# Patient Record
Sex: FEMALE | Race: WHITE | NOT HISPANIC OR LATINO | URBAN - METROPOLITAN AREA
[De-identification: names, ages, dates, MRNs, and addresses within clinical notes are randomized per-mention and may not be internally consistent; named-entity substitution may affect disease eponyms.]

---

## 2023-01-31 PROBLEM — M25.569 KNEE PAIN: Status: ACTIVE | Noted: 2023-01-31

## 2023-01-31 PROBLEM — E78.5 ELEVATED LIPIDS: Status: ACTIVE | Noted: 2023-01-31

## 2023-01-31 PROBLEM — H52.10 MYOPIA WITH ASTIGMATISM AND PRESBYOPIA: Status: ACTIVE | Noted: 2023-01-31

## 2023-01-31 PROBLEM — R92.30 DENSE BREAST TISSUE: Status: ACTIVE | Noted: 2023-01-31

## 2023-01-31 PROBLEM — H11.433 CONJUNCTIVAL HYPEREMIA OF BOTH EYES: Status: ACTIVE | Noted: 2023-01-31

## 2023-01-31 PROBLEM — H04.121 DRY EYE SYNDROME OF RIGHT LACRIMAL GLAND: Status: ACTIVE | Noted: 2023-01-31

## 2023-01-31 PROBLEM — I10 BENIGN ESSENTIAL HYPERTENSION: Status: ACTIVE | Noted: 2023-01-31

## 2023-01-31 PROBLEM — H40.001 GLAUCOMA SUSPECT OF RIGHT EYE: Status: ACTIVE | Noted: 2023-01-31

## 2023-01-31 PROBLEM — H40.059 ELEVATED IOP: Status: ACTIVE | Noted: 2023-01-31

## 2023-01-31 PROBLEM — G47.00 INSOMNIA: Status: ACTIVE | Noted: 2023-01-31

## 2023-01-31 PROBLEM — M79.662 PAIN OF LEFT CALF: Status: ACTIVE | Noted: 2023-01-31

## 2023-01-31 PROBLEM — L97.211: Status: ACTIVE | Noted: 2023-01-31

## 2023-01-31 PROBLEM — H52.4 MYOPIA WITH ASTIGMATISM AND PRESBYOPIA: Status: ACTIVE | Noted: 2023-01-31

## 2023-01-31 PROBLEM — H04.122 DRY EYE SYNDROME OF LEFT LACRIMAL GLAND: Status: ACTIVE | Noted: 2023-01-31

## 2023-01-31 PROBLEM — H52.209 MYOPIA WITH ASTIGMATISM AND PRESBYOPIA: Status: ACTIVE | Noted: 2023-01-31

## 2023-01-31 RX ORDER — MULTIVIT-MIN/IRON/FOLIC ACID/K 18-600-40
CAPSULE ORAL
COMMUNITY

## 2023-01-31 RX ORDER — AMLODIPINE BESYLATE 2.5 MG/1
2.5 TABLET ORAL DAILY
COMMUNITY
End: 2023-03-27 | Stop reason: DRUGHIGH

## 2023-01-31 RX ORDER — LATANOPROST 50 UG/ML
1 SOLUTION/ DROPS OPHTHALMIC NIGHTLY
COMMUNITY
Start: 2022-01-14 | End: 2023-11-20 | Stop reason: SDUPTHER

## 2023-01-31 RX ORDER — SPIRONOLACTONE 50 MG/1
1 TABLET, FILM COATED ORAL DAILY
COMMUNITY
Start: 2014-03-04

## 2023-03-14 ENCOUNTER — OFFICE VISIT (OUTPATIENT)
Dept: PRIMARY CARE | Facility: CLINIC | Age: 58
End: 2023-03-14
Payer: MEDICAID

## 2023-03-14 VITALS
HEIGHT: 66 IN | HEART RATE: 88 BPM | SYSTOLIC BLOOD PRESSURE: 136 MMHG | DIASTOLIC BLOOD PRESSURE: 86 MMHG | WEIGHT: 119 LBS | BODY MASS INDEX: 19.13 KG/M2

## 2023-03-14 DIAGNOSIS — I10 BENIGN ESSENTIAL HYPERTENSION: Primary | ICD-10-CM

## 2023-03-14 PROCEDURE — 3075F SYST BP GE 130 - 139MM HG: CPT | Performed by: INTERNAL MEDICINE

## 2023-03-14 PROCEDURE — 1036F TOBACCO NON-USER: CPT | Performed by: INTERNAL MEDICINE

## 2023-03-14 PROCEDURE — 3079F DIAST BP 80-89 MM HG: CPT | Performed by: INTERNAL MEDICINE

## 2023-03-14 PROCEDURE — 99213 OFFICE O/P EST LOW 20 MIN: CPT | Performed by: INTERNAL MEDICINE

## 2023-03-14 RX ORDER — FLUOROURACIL 50 MG/G
CREAM TOPICAL
COMMUNITY
Start: 2023-01-20 | End: 2024-01-16

## 2023-03-14 RX ORDER — TRIAMCINOLONE ACETONIDE 1 MG/G
OINTMENT TOPICAL
COMMUNITY
Start: 2023-02-21

## 2023-03-14 RX ORDER — VALACYCLOVIR HYDROCHLORIDE 1 G/1
TABLET, FILM COATED ORAL
COMMUNITY
Start: 2023-02-21

## 2023-03-14 RX ORDER — TRETINOIN 0.5 MG/G
CREAM TOPICAL
COMMUNITY
Start: 2022-12-21

## 2023-03-14 ASSESSMENT — PAIN SCALES - GENERAL: PAINLEVEL: 0-NO PAIN

## 2023-03-14 ASSESSMENT — ENCOUNTER SYMPTOMS
ENDOCRINE NEGATIVE: 1
MUSCULOSKELETAL NEGATIVE: 1
HEMATOLOGIC/LYMPHATIC NEGATIVE: 1
CONSTITUTIONAL NEGATIVE: 1
ALLERGIC/IMMUNOLOGIC NEGATIVE: 1
NEUROLOGICAL NEGATIVE: 1
EYES NEGATIVE: 1
PSYCHIATRIC NEGATIVE: 1
GASTROINTESTINAL NEGATIVE: 1
RESPIRATORY NEGATIVE: 1
CARDIOVASCULAR NEGATIVE: 1

## 2023-03-14 NOTE — PROGRESS NOTES
"Subjective   Patient ID: Amara Cote is a 57 y.o. female who presents for No chief complaint on file..    HTN follow up-         HPI     Feeling well- started Amlodipine 2.5 mg daily          Review of Systems   Constitutional: Negative.    HENT: Negative.     Eyes: Negative.    Respiratory: Negative.     Cardiovascular: Negative.    Gastrointestinal: Negative.    Endocrine: Negative.    Genitourinary: Negative.    Musculoskeletal: Negative.    Skin: Negative.    Allergic/Immunologic: Negative.    Neurological: Negative.    Hematological: Negative.    Psychiatric/Behavioral: Negative.         Objective   BP (!) 154/102 (BP Location: Left arm, Patient Position: Sitting, BP Cuff Size: Small adult)   Pulse 88   Ht 1.676 m (5' 6\")   Wt 54 kg (119 lb)   BMI 19.21 kg/m²     Physical Exam  Constitutional:       Appearance: Normal appearance.   HENT:      Head: Normocephalic and atraumatic.      Right Ear: Tympanic membrane, ear canal and external ear normal.      Left Ear: Tympanic membrane, ear canal and external ear normal.      Nose: Nose normal.      Mouth/Throat:      Mouth: Mucous membranes are moist.   Eyes:      Extraocular Movements: Extraocular movements intact.      Conjunctiva/sclera: Conjunctivae normal.      Pupils: Pupils are equal, round, and reactive to light.   Cardiovascular:      Rate and Rhythm: Normal rate and regular rhythm.      Pulses: Normal pulses.      Heart sounds: Normal heart sounds.   Pulmonary:      Effort: Pulmonary effort is normal.      Breath sounds: Normal breath sounds.   Abdominal:      General: Abdomen is flat. Bowel sounds are normal.      Palpations: Abdomen is soft.   Musculoskeletal:         General: Normal range of motion.      Cervical back: Normal range of motion.   Skin:     General: Skin is warm.   Neurological:      General: No focal deficit present.      Mental Status: She is alert and oriented to person, place, and time.   Psychiatric:         Mood and Affect: " Mood normal.         Behavior: Behavior normal.         Assessment/Plan       #1  HTN    Increase Amlodipine 5 mg daily    MANDA < 2000 MG /day    Increase K intake ( Greens/Beans/Potatoes/Bananas)    Physical activity-     #2 HLD    CT-Cardiac score    Continue with Current treatment    Follow up in 3 months/PRN

## 2023-03-27 ENCOUNTER — TELEPHONE (OUTPATIENT)
Dept: PRIMARY CARE | Facility: CLINIC | Age: 58
End: 2023-03-27
Payer: COMMERCIAL

## 2023-03-27 RX ORDER — AMLODIPINE BESYLATE 5 MG/1
5 TABLET ORAL DAILY
Qty: 90 TABLET | Refills: 3 | Status: SHIPPED | OUTPATIENT
Start: 2023-03-27 | End: 2023-03-30 | Stop reason: SDUPTHER

## 2023-03-27 NOTE — TELEPHONE ENCOUNTER
Patient said KATIE was supposed to call in increased dose of Amlodipine to pharmacy and did not she said she believes it was for 5 MG.    [facility]    KATIE sent in medication

## 2023-03-30 ENCOUNTER — TELEPHONE (OUTPATIENT)
Dept: PRIMARY CARE | Facility: CLINIC | Age: 58
End: 2023-03-30
Payer: COMMERCIAL

## 2023-03-30 DIAGNOSIS — I10 PRIMARY HYPERTENSION: Primary | ICD-10-CM

## 2023-03-30 RX ORDER — AMLODIPINE BESYLATE 5 MG/1
5 TABLET ORAL DAILY
Qty: 90 TABLET | Refills: 3 | Status: SHIPPED | OUTPATIENT
Start: 2023-03-30 | End: 2024-03-07 | Stop reason: SDUPTHER

## 2023-03-30 NOTE — TELEPHONE ENCOUNTER
Pt called in and stated that she has called the pharmacy multiple times and is being told there is nothing in the system for RX: Amlodipine 5mg tablet      [facility] [phone]          RX was resent to pharmacy per RMB . I called and spoke with Pt and she stated she is on her way to  now

## 2023-06-06 ENCOUNTER — APPOINTMENT (OUTPATIENT)
Dept: PRIMARY CARE | Facility: CLINIC | Age: 58
End: 2023-06-06
Payer: COMMERCIAL

## 2023-07-19 ENCOUNTER — LAB (OUTPATIENT)
Dept: LAB | Facility: LAB | Age: 58
End: 2023-07-19
Payer: COMMERCIAL

## 2023-07-19 ENCOUNTER — OFFICE VISIT (OUTPATIENT)
Dept: PRIMARY CARE | Facility: CLINIC | Age: 58
End: 2023-07-19
Payer: COMMERCIAL

## 2023-07-19 VITALS
WEIGHT: 121.6 LBS | OXYGEN SATURATION: 95 % | DIASTOLIC BLOOD PRESSURE: 84 MMHG | SYSTOLIC BLOOD PRESSURE: 140 MMHG | RESPIRATION RATE: 20 BRPM | HEART RATE: 83 BPM | BODY MASS INDEX: 19.63 KG/M2

## 2023-07-19 DIAGNOSIS — L81.7 PIGMENTED PURPURIC DERMATOSIS: Primary | ICD-10-CM

## 2023-07-19 DIAGNOSIS — Z00.00 ANNUAL PHYSICAL EXAM: ICD-10-CM

## 2023-07-19 DIAGNOSIS — D47.2 MGUS (MONOCLONAL GAMMOPATHY OF UNKNOWN SIGNIFICANCE): ICD-10-CM

## 2023-07-19 DIAGNOSIS — I10 BENIGN ESSENTIAL HYPERTENSION: ICD-10-CM

## 2023-07-19 DIAGNOSIS — L81.7 PIGMENTED PURPURIC DERMATOSIS: ICD-10-CM

## 2023-07-19 LAB
ALANINE AMINOTRANSFERASE (SGPT) (U/L) IN SER/PLAS: 11 U/L (ref 7–45)
ALBUMIN (G/DL) IN SER/PLAS: 5 G/DL (ref 3.4–5)
ALKALINE PHOSPHATASE (U/L) IN SER/PLAS: 61 U/L (ref 33–110)
ANION GAP IN SER/PLAS: 14 MMOL/L (ref 10–20)
ASPARTATE AMINOTRANSFERASE (SGOT) (U/L) IN SER/PLAS: 17 U/L (ref 9–39)
BASOPHILS (10*3/UL) IN BLOOD BY AUTOMATED COUNT: 0.05 X10E9/L (ref 0–0.1)
BASOPHILS/100 LEUKOCYTES IN BLOOD BY AUTOMATED COUNT: 0.7 % (ref 0–2)
BILIRUBIN TOTAL (MG/DL) IN SER/PLAS: 0.6 MG/DL (ref 0–1.2)
C REACTIVE PROTEIN (MG/L) IN SER/PLAS: <0.1 MG/DL
CALCIUM (MG/DL) IN SER/PLAS: 9.7 MG/DL (ref 8.6–10.3)
CARBON DIOXIDE, TOTAL (MMOL/L) IN SER/PLAS: 29 MMOL/L (ref 21–32)
CHLORIDE (MMOL/L) IN SER/PLAS: 96 MMOL/L (ref 98–107)
CREATININE (MG/DL) IN SER/PLAS: 0.72 MG/DL (ref 0.5–1.05)
EOSINOPHILS (10*3/UL) IN BLOOD BY AUTOMATED COUNT: 0.08 X10E9/L (ref 0–0.7)
EOSINOPHILS/100 LEUKOCYTES IN BLOOD BY AUTOMATED COUNT: 1.1 % (ref 0–6)
ERYTHROCYTE DISTRIBUTION WIDTH (RATIO) BY AUTOMATED COUNT: 12 % (ref 11.5–14.5)
ERYTHROCYTE MEAN CORPUSCULAR HEMOGLOBIN CONCENTRATION (G/DL) BY AUTOMATED: 34.2 G/DL (ref 32–36)
ERYTHROCYTE MEAN CORPUSCULAR VOLUME (FL) BY AUTOMATED COUNT: 91 FL (ref 80–100)
ERYTHROCYTES (10*6/UL) IN BLOOD BY AUTOMATED COUNT: 4.51 X10E12/L (ref 4–5.2)
GFR FEMALE: >90 ML/MIN/1.73M2
GLUCOSE (MG/DL) IN SER/PLAS: 92 MG/DL (ref 74–99)
HEMATOCRIT (%) IN BLOOD BY AUTOMATED COUNT: 41.2 % (ref 36–46)
HEMOGLOBIN (G/DL) IN BLOOD: 14.1 G/DL (ref 12–16)
IMMATURE GRANULOCYTES/100 LEUKOCYTES IN BLOOD BY AUTOMATED COUNT: 0.1 % (ref 0–0.9)
LEUKOCYTES (10*3/UL) IN BLOOD BY AUTOMATED COUNT: 7 X10E9/L (ref 4.4–11.3)
LYMPHOCYTES (10*3/UL) IN BLOOD BY AUTOMATED COUNT: 2.2 X10E9/L (ref 1.2–4.8)
LYMPHOCYTES/100 LEUKOCYTES IN BLOOD BY AUTOMATED COUNT: 31.5 % (ref 13–44)
MONOCYTES (10*3/UL) IN BLOOD BY AUTOMATED COUNT: 0.6 X10E9/L (ref 0.1–1)
MONOCYTES/100 LEUKOCYTES IN BLOOD BY AUTOMATED COUNT: 8.6 % (ref 2–10)
NEUTROPHILS (10*3/UL) IN BLOOD BY AUTOMATED COUNT: 4.05 X10E9/L (ref 1.2–7.7)
NEUTROPHILS/100 LEUKOCYTES IN BLOOD BY AUTOMATED COUNT: 58 % (ref 40–80)
PLATELETS (10*3/UL) IN BLOOD AUTOMATED COUNT: 324 X10E9/L (ref 150–450)
POTASSIUM (MMOL/L) IN SER/PLAS: 4 MMOL/L (ref 3.5–5.3)
PROTEIN TOTAL: 8.1 G/DL (ref 6.4–8.2)
SEDIMENTATION RATE, ERYTHROCYTE: 17 MM/H (ref 0–30)
SODIUM (MMOL/L) IN SER/PLAS: 135 MMOL/L (ref 136–145)
THYROTROPIN (MIU/L) IN SER/PLAS BY DETECTION LIMIT <= 0.05 MIU/L: 2.36 MIU/L (ref 0.44–3.98)
UREA NITROGEN (MG/DL) IN SER/PLAS: 11 MG/DL (ref 6–23)

## 2023-07-19 PROCEDURE — 1036F TOBACCO NON-USER: CPT | Performed by: INTERNAL MEDICINE

## 2023-07-19 PROCEDURE — 36415 COLL VENOUS BLD VENIPUNCTURE: CPT

## 2023-07-19 PROCEDURE — 80053 COMPREHEN METABOLIC PANEL: CPT

## 2023-07-19 PROCEDURE — 99214 OFFICE O/P EST MOD 30 MIN: CPT | Performed by: INTERNAL MEDICINE

## 2023-07-19 PROCEDURE — 82784 ASSAY IGA/IGD/IGG/IGM EACH: CPT

## 2023-07-19 PROCEDURE — 84443 ASSAY THYROID STIM HORMONE: CPT

## 2023-07-19 PROCEDURE — 85025 COMPLETE CBC W/AUTO DIFF WBC: CPT

## 2023-07-19 PROCEDURE — 3079F DIAST BP 80-89 MM HG: CPT | Performed by: INTERNAL MEDICINE

## 2023-07-19 PROCEDURE — 3077F SYST BP >= 140 MM HG: CPT | Performed by: INTERNAL MEDICINE

## 2023-07-19 PROCEDURE — 85652 RBC SED RATE AUTOMATED: CPT

## 2023-07-19 PROCEDURE — 86235 NUCLEAR ANTIGEN ANTIBODY: CPT

## 2023-07-19 PROCEDURE — 86140 C-REACTIVE PROTEIN: CPT

## 2023-07-19 PROCEDURE — 86225 DNA ANTIBODY NATIVE: CPT

## 2023-07-19 ASSESSMENT — ENCOUNTER SYMPTOMS
OCCASIONAL FEELINGS OF UNSTEADINESS: 0
DEPRESSION: 0
LOSS OF SENSATION IN FEET: 0

## 2023-07-19 ASSESSMENT — PATIENT HEALTH QUESTIONNAIRE - PHQ9
1. LITTLE INTEREST OR PLEASURE IN DOING THINGS: NOT AT ALL
2. FEELING DOWN, DEPRESSED OR HOPELESS: NOT AT ALL
SUM OF ALL RESPONSES TO PHQ9 QUESTIONS 1 AND 2: 0

## 2023-07-19 NOTE — PROGRESS NOTES
Subjective   Patient ID: Amara Cote is a 57 y.o. female who presents for Follow-up (3 months) and Rash (Both feet and ankles).    HPI     Rash- L/E -Few weeks    Itching    Using Steroid cream    + stress    Review of Systems    PMH:    HTN    No Fever/chills/headaches/dizziness/chest pains/ shortness of breath/palpitations/Nausea/vomiting/diarrhea/ constipation/urine frequency/blood in urine.      Objective   BP (!) 152/97 (BP Location: Left arm, Patient Position: Sitting, BP Cuff Size: Small adult) Comment: manual bp  Pulse 83   Resp 20   Wt 55.2 kg (121 lb 9.6 oz)   SpO2 95%   BMI 19.63 kg/m²     Physical Exam    No JVP elevation. No palpable Lymph Nodes. No Thyromegaly    CVS-NL S1/S2 . No MRG    Lungs-CTA. B/S= B/L    Abdomen-Soft, Non-tender. No masses or HSM    Extremities: No C/C/E    Skin- erythematous mac/pap lesion    Trace edema    Assessment/Plan     HTN    Purpuric Dermatosis    Stress/Anxiety    Plan:    Labs    Continue with Amlodipne 5 mg daily    Dermatology follow up -? [facility]    Follow up/ Call with any concerns     Follow up 3 months/PRN

## 2023-07-20 LAB
ANTI-CENTROMERE: <0.2 AI
ANTI-CHROMATIN: <0.2 AI
ANTI-DNA (DS): <1 IU/ML
ANTI-JO-1 IGG: <0.2 AI
ANTI-NUCLEAR ANTIBODY (ANA): NEGATIVE
ANTI-RIBOSOMAL P: <0.2 AI
ANTI-RNP: 0.2 AI
ANTI-SCL-70: <0.2 AI
ANTI-SM/RNP: <0.2 AI
ANTI-SM: <0.2 AI
ANTI-SSA: <0.2 AI
ANTI-SSB: <0.2 AI
IGA (MG/DL) IN SER/PLAS: 458 MG/DL (ref 70–400)
IGG (MG/DL) IN SER/PLAS: 1220 MG/DL (ref 700–1600)
IGM (MG/DL) IN SER/PLAS: 108 MG/DL (ref 40–230)

## 2023-07-24 ENCOUNTER — TELEPHONE (OUTPATIENT)
Dept: PRIMARY CARE | Facility: CLINIC | Age: 58
End: 2023-07-24

## 2023-10-09 ENCOUNTER — ANCILLARY PROCEDURE (OUTPATIENT)
Dept: RADIOLOGY | Facility: CLINIC | Age: 58
End: 2023-10-09
Payer: COMMERCIAL

## 2023-10-09 ENCOUNTER — LAB (OUTPATIENT)
Dept: LAB | Facility: LAB | Age: 58
End: 2023-10-09
Payer: COMMERCIAL

## 2023-10-09 DIAGNOSIS — L24.9 IRRITANT CONTACT DERMATITIS, UNSPECIFIED CAUSE: Primary | ICD-10-CM

## 2023-10-09 DIAGNOSIS — Z12.31 ENCOUNTER FOR SCREENING MAMMOGRAM FOR MALIGNANT NEOPLASM OF BREAST: ICD-10-CM

## 2023-10-09 LAB
ANION GAP SERPL CALC-SCNC: 14 MMOL/L (ref 10–20)
APPEARANCE UR: CLEAR
BILIRUB UR STRIP.AUTO-MCNC: NEGATIVE MG/DL
BUN SERPL-MCNC: 10 MG/DL (ref 6–23)
CALCIUM SERPL-MCNC: 9.8 MG/DL (ref 8.6–10.3)
CHLORIDE SERPL-SCNC: 94 MMOL/L (ref 98–107)
CO2 SERPL-SCNC: 28 MMOL/L (ref 21–32)
COLOR UR: COLORLESS
CREAT SERPL-MCNC: 0.67 MG/DL (ref 0.5–1.05)
CREAT UR-MCNC: 17.9 MG/DL (ref 20–320)
GFR SERPL CREATININE-BSD FRML MDRD: >90 ML/MIN/1.73M*2
GLUCOSE SERPL-MCNC: 84 MG/DL (ref 74–99)
GLUCOSE UR STRIP.AUTO-MCNC: NEGATIVE MG/DL
KETONES UR STRIP.AUTO-MCNC: NEGATIVE MG/DL
LEUKOCYTE ESTERASE UR QL STRIP.AUTO: NEGATIVE
MICROALBUMIN UR-MCNC: <7 MG/L
MICROALBUMIN/CREAT UR: ABNORMAL MG/G{CREAT}
NITRITE UR QL STRIP.AUTO: NEGATIVE
PH UR STRIP.AUTO: 6 [PH]
POTASSIUM SERPL-SCNC: 3.9 MMOL/L (ref 3.5–5.3)
PROT SERPL-MCNC: 8.2 G/DL (ref 6.4–8.2)
PROT UR STRIP.AUTO-MCNC: NEGATIVE MG/DL
RBC # UR STRIP.AUTO: NEGATIVE /UL
SODIUM SERPL-SCNC: 132 MMOL/L (ref 136–145)
SP GR UR STRIP.AUTO: 1
UROBILINOGEN UR STRIP.AUTO-MCNC: <2 MG/DL

## 2023-10-09 PROCEDURE — 36415 COLL VENOUS BLD VENIPUNCTURE: CPT

## 2023-10-09 PROCEDURE — 81003 URINALYSIS AUTO W/O SCOPE: CPT

## 2023-10-09 PROCEDURE — 84165 PROTEIN E-PHORESIS SERUM: CPT | Performed by: DERMATOLOGY

## 2023-10-09 PROCEDURE — 80048 BASIC METABOLIC PNL TOTAL CA: CPT

## 2023-10-09 PROCEDURE — 86060 ANTISTREPTOLYSIN O TITER: CPT

## 2023-10-09 PROCEDURE — 82570 ASSAY OF URINE CREATININE: CPT

## 2023-10-09 PROCEDURE — 77063 BREAST TOMOSYNTHESIS BI: CPT | Mod: BILATERAL PROCEDURE | Performed by: RADIOLOGY

## 2023-10-09 PROCEDURE — 82043 UR ALBUMIN QUANTITATIVE: CPT

## 2023-10-09 PROCEDURE — 77067 SCR MAMMO BI INCL CAD: CPT

## 2023-10-09 PROCEDURE — 84156 ASSAY OF PROTEIN URINE: CPT

## 2023-10-09 PROCEDURE — 84166 PROTEIN E-PHORESIS/URINE/CSF: CPT | Performed by: DERMATOLOGY

## 2023-10-09 PROCEDURE — 84165 PROTEIN E-PHORESIS SERUM: CPT

## 2023-10-09 PROCEDURE — 84155 ASSAY OF PROTEIN SERUM: CPT

## 2023-10-09 PROCEDURE — 77067 SCR MAMMO BI INCL CAD: CPT | Mod: BILATERAL PROCEDURE | Performed by: RADIOLOGY

## 2023-10-09 PROCEDURE — 84166 PROTEIN E-PHORESIS/URINE/CSF: CPT

## 2023-10-10 ENCOUNTER — APPOINTMENT (OUTPATIENT)
Dept: RADIOLOGY | Facility: CLINIC | Age: 58
End: 2023-10-10
Payer: COMMERCIAL

## 2023-10-10 LAB
ASO AB SERPL-ACNC: <20 IU/ML (ref 0–250)
PROT UR-ACNC: <4 MG/DL (ref 5–25)

## 2023-10-13 LAB
ALBUMIN MFR UR ELPH: 34.2 %
ALBUMIN: 5.1 G/DL (ref 3.4–5)
ALPHA 1 GLOBULIN: 0.3 G/DL (ref 0.2–0.6)
ALPHA 2 GLOBULIN: 0.7 G/DL (ref 0.4–1.1)
ALPHA1 GLOB MFR UR ELPH: 10.2 %
ALPHA2 GLOB MFR UR ELPH: 14.7 %
B-GLOBULIN MFR UR ELPH: 14.2 %
BETA GLOBULIN: 0.9 G/DL (ref 0.5–1.2)
GAMMA GLOB MFR UR ELPH: 26.7 %
GAMMA GLOBULIN: 1.1 G/DL (ref 0.5–1.4)
PATH REVIEW-SERUM PROTEIN ELECTROPHORESIS: ABNORMAL
PATH REVIEW-URINE PROTEIN ELECTROPHORESIS: NORMAL
PROTEIN ELECTROPHORESIS COMMENT: ABNORMAL
URINE ELECTROPHORESIS COMMENT: NORMAL

## 2023-10-16 ENCOUNTER — TELEPHONE (OUTPATIENT)
Dept: OPHTHALMOLOGY | Facility: CLINIC | Age: 58
End: 2023-10-16
Payer: COMMERCIAL

## 2023-10-16 ENCOUNTER — OFFICE VISIT (OUTPATIENT)
Dept: OPHTHALMOLOGY | Facility: CLINIC | Age: 58
End: 2023-10-16
Payer: COMMERCIAL

## 2023-10-16 DIAGNOSIS — H43.812 POSTERIOR VITREOUS DETACHMENT OF LEFT EYE: ICD-10-CM

## 2023-10-16 DIAGNOSIS — H53.19 PHOTOPSIA: ICD-10-CM

## 2023-10-16 DIAGNOSIS — H43.393 VITREOUS SYNERESIS OF BOTH EYES: Primary | ICD-10-CM

## 2023-10-16 PROCEDURE — 92134 CPTRZ OPH DX IMG PST SGM RTA: CPT | Mod: BILATERAL PROCEDURE | Performed by: OPTOMETRIST

## 2023-10-16 PROCEDURE — 99213 OFFICE O/P EST LOW 20 MIN: CPT | Performed by: OPTOMETRIST

## 2023-10-16 ASSESSMENT — CONF VISUAL FIELD
OS_SUPERIOR_NASAL_RESTRICTION: 0
OD_SUPERIOR_TEMPORAL_RESTRICTION: 0
OD_INFERIOR_TEMPORAL_RESTRICTION: 0
OD_SUPERIOR_NASAL_RESTRICTION: 0
OS_SUPERIOR_TEMPORAL_RESTRICTION: 0
OS_INFERIOR_TEMPORAL_RESTRICTION: 0
OS_INFERIOR_NASAL_RESTRICTION: 0
OS_NORMAL: 1
OD_INFERIOR_NASAL_RESTRICTION: 0
OD_NORMAL: 1

## 2023-10-16 ASSESSMENT — TONOMETRY
OD_IOP_MMHG: 18
IOP_METHOD: TONOPEN
OS_IOP_MMHG: 19

## 2023-10-16 ASSESSMENT — VISUAL ACUITY
OD_CC: 20/20
METHOD: SNELLEN - LINEAR
OS_CC: 20/20-

## 2023-10-16 ASSESSMENT — CUP TO DISC RATIO
OS_RATIO: 0.45
OD_RATIO: 0.6

## 2023-10-16 ASSESSMENT — SLIT LAMP EXAM - LIDS
COMMENTS: NORMAL
COMMENTS: NORMAL

## 2023-10-16 ASSESSMENT — EXTERNAL EXAM - LEFT EYE: OS_EXAM: NORMAL

## 2023-10-16 ASSESSMENT — EXTERNAL EXAM - RIGHT EYE: OD_EXAM: NORMAL

## 2023-10-16 NOTE — PROGRESS NOTES
#Vitreous syneresis, both eyes, without PVD  - 57 yo female with history of ocular HTN/glaucoma suspect presenting after new onset of floaters in vision out of left eye, which has since resolved  - Entrance testing reassuring, vision is excellent  - Exam significant for vitreous syneresis both eyes (OU) without PVDs   - No signs of retinal tears or detachments on scleral depression both eyes (OU)  - Return precautions for retinal detachment (RD) given, including new onset floaters, flashes, vision loss, pain, or any other concerning symptoms  - Patient has appointment with Dr. Rosario on 11/20/23

## 2023-11-13 ENCOUNTER — LAB (OUTPATIENT)
Dept: LAB | Facility: LAB | Age: 58
End: 2023-11-13
Payer: COMMERCIAL

## 2023-11-13 DIAGNOSIS — Z00.00 ROUTINE GENERAL MEDICAL EXAMINATION AT A HEALTH CARE FACILITY: ICD-10-CM

## 2023-11-13 LAB
ALBUMIN SERPL BCP-MCNC: 4.3 G/DL (ref 3.4–5)
ALP SERPL-CCNC: 56 U/L (ref 33–110)
ALT SERPL W P-5'-P-CCNC: 11 U/L (ref 7–45)
ANION GAP SERPL CALC-SCNC: 16 MMOL/L (ref 10–20)
AST SERPL W P-5'-P-CCNC: 17 U/L (ref 9–39)
BASOPHILS # BLD AUTO: 0.04 X10*3/UL (ref 0–0.1)
BASOPHILS NFR BLD AUTO: 0.6 %
BILIRUB SERPL-MCNC: 0.5 MG/DL (ref 0–1.2)
BUN SERPL-MCNC: 9 MG/DL (ref 6–23)
CALCIUM SERPL-MCNC: 9.8 MG/DL (ref 8.6–10.3)
CHLORIDE SERPL-SCNC: 99 MMOL/L (ref 98–107)
CHOLEST SERPL-MCNC: 296 MG/DL (ref 0–199)
CHOLESTEROL/HDL RATIO: 3.8
CO2 SERPL-SCNC: 25 MMOL/L (ref 21–32)
CREAT SERPL-MCNC: 0.7 MG/DL (ref 0.5–1.05)
EOSINOPHIL # BLD AUTO: 0.11 X10*3/UL (ref 0–0.7)
EOSINOPHIL NFR BLD AUTO: 1.6 %
ERYTHROCYTE [DISTWIDTH] IN BLOOD BY AUTOMATED COUNT: 12.1 % (ref 11.5–14.5)
GFR SERPL CREATININE-BSD FRML MDRD: >90 ML/MIN/1.73M*2
GLUCOSE SERPL-MCNC: 102 MG/DL (ref 74–99)
HCT VFR BLD AUTO: 39.5 % (ref 36–46)
HDLC SERPL-MCNC: 78.8 MG/DL
HGB BLD-MCNC: 13.3 G/DL (ref 12–16)
IMM GRANULOCYTES # BLD AUTO: 0.02 X10*3/UL (ref 0–0.7)
IMM GRANULOCYTES NFR BLD AUTO: 0.3 % (ref 0–0.9)
LDLC SERPL CALC-MCNC: 193 MG/DL
LYMPHOCYTES # BLD AUTO: 1.89 X10*3/UL (ref 1.2–4.8)
LYMPHOCYTES NFR BLD AUTO: 27.9 %
MCH RBC QN AUTO: 30.6 PG (ref 26–34)
MCHC RBC AUTO-ENTMCNC: 33.7 G/DL (ref 32–36)
MCV RBC AUTO: 91 FL (ref 80–100)
MONOCYTES # BLD AUTO: 0.69 X10*3/UL (ref 0.1–1)
MONOCYTES NFR BLD AUTO: 10.2 %
NEUTROPHILS # BLD AUTO: 4.03 X10*3/UL (ref 1.2–7.7)
NEUTROPHILS NFR BLD AUTO: 59.4 %
NON HDL CHOLESTEROL: 217 MG/DL (ref 0–149)
NRBC BLD-RTO: 0 /100 WBCS (ref 0–0)
PLATELET # BLD AUTO: 340 X10*3/UL (ref 150–450)
POTASSIUM SERPL-SCNC: 4 MMOL/L (ref 3.5–5.3)
PROT SERPL-MCNC: 7.7 G/DL (ref 6.4–8.2)
RBC # BLD AUTO: 4.34 X10*6/UL (ref 4–5.2)
SODIUM SERPL-SCNC: 136 MMOL/L (ref 136–145)
TRIGL SERPL-MCNC: 121 MG/DL (ref 0–149)
TSH SERPL-ACNC: 2.15 MIU/L (ref 0.44–3.98)
VLDL: 24 MG/DL (ref 0–40)
WBC # BLD AUTO: 6.8 X10*3/UL (ref 4.4–11.3)

## 2023-11-13 PROCEDURE — 85025 COMPLETE CBC W/AUTO DIFF WBC: CPT

## 2023-11-13 PROCEDURE — 80061 LIPID PANEL: CPT

## 2023-11-13 PROCEDURE — 80053 COMPREHEN METABOLIC PANEL: CPT

## 2023-11-13 PROCEDURE — 84443 ASSAY THYROID STIM HORMONE: CPT

## 2023-11-13 PROCEDURE — 36415 COLL VENOUS BLD VENIPUNCTURE: CPT

## 2023-11-16 ENCOUNTER — OFFICE VISIT (OUTPATIENT)
Dept: PRIMARY CARE | Facility: CLINIC | Age: 58
End: 2023-11-16
Payer: COMMERCIAL

## 2023-11-16 VITALS
HEIGHT: 66 IN | BODY MASS INDEX: 19.41 KG/M2 | TEMPERATURE: 98.1 F | SYSTOLIC BLOOD PRESSURE: 138 MMHG | OXYGEN SATURATION: 98 % | WEIGHT: 120.8 LBS | HEART RATE: 76 BPM | DIASTOLIC BLOOD PRESSURE: 84 MMHG

## 2023-11-16 DIAGNOSIS — Z00.00 ROUTINE GENERAL MEDICAL EXAMINATION AT A HEALTH CARE FACILITY: Primary | ICD-10-CM

## 2023-11-16 PROCEDURE — 99396 PREV VISIT EST AGE 40-64: CPT | Performed by: INTERNAL MEDICINE

## 2023-11-16 PROCEDURE — 3075F SYST BP GE 130 - 139MM HG: CPT | Performed by: INTERNAL MEDICINE

## 2023-11-16 PROCEDURE — 3079F DIAST BP 80-89 MM HG: CPT | Performed by: INTERNAL MEDICINE

## 2023-11-16 PROCEDURE — 1036F TOBACCO NON-USER: CPT | Performed by: INTERNAL MEDICINE

## 2023-11-20 ENCOUNTER — OFFICE VISIT (OUTPATIENT)
Dept: OPHTHALMOLOGY | Facility: CLINIC | Age: 58
End: 2023-11-20
Payer: COMMERCIAL

## 2023-11-20 DIAGNOSIS — H40.003 GLAUCOMA SUSPECT OF BOTH EYES: Primary | ICD-10-CM

## 2023-11-20 DIAGNOSIS — H40.053 BILATERAL OCULAR HYPERTENSION: ICD-10-CM

## 2023-11-20 PROCEDURE — 99214 OFFICE O/P EST MOD 30 MIN: CPT | Performed by: OPHTHALMOLOGY

## 2023-11-20 PROCEDURE — 1036F TOBACCO NON-USER: CPT | Performed by: OPHTHALMOLOGY

## 2023-11-20 PROCEDURE — 92133 CPTRZD OPH DX IMG PST SGM ON: CPT | Performed by: OPHTHALMOLOGY

## 2023-11-20 RX ORDER — LATANOPROST 50 UG/ML
1 SOLUTION/ DROPS OPHTHALMIC NIGHTLY
Qty: 7.5 ML | Refills: 3 | Status: SHIPPED | OUTPATIENT
Start: 2023-11-20 | End: 2024-11-19

## 2023-11-20 ASSESSMENT — VISUAL ACUITY
METHOD: SNELLEN - LINEAR
OS_CC: 20/20
OS_CC+: -1
OD_CC+: -1
OD_CC: 20/20
CORRECTION_TYPE: GLASSES

## 2023-11-20 ASSESSMENT — CUP TO DISC RATIO
OD_RATIO: 0.6
OS_RATIO: 0.45

## 2023-11-20 ASSESSMENT — CONF VISUAL FIELD
OD_SUPERIOR_TEMPORAL_RESTRICTION: 0
OS_SUPERIOR_NASAL_RESTRICTION: 0
OS_INFERIOR_TEMPORAL_RESTRICTION: 0
OS_INFERIOR_NASAL_RESTRICTION: 0
OS_NORMAL: 1
OD_INFERIOR_NASAL_RESTRICTION: 0
OD_SUPERIOR_NASAL_RESTRICTION: 0
OS_SUPERIOR_TEMPORAL_RESTRICTION: 0
OD_NORMAL: 1
OD_INFERIOR_TEMPORAL_RESTRICTION: 0

## 2023-11-20 ASSESSMENT — ENCOUNTER SYMPTOMS
MUSCULOSKELETAL NEGATIVE: 0
HEMATOLOGIC/LYMPHATIC NEGATIVE: 0
GASTROINTESTINAL NEGATIVE: 0
ENDOCRINE NEGATIVE: 0
ALLERGIC/IMMUNOLOGIC NEGATIVE: 0
RESPIRATORY NEGATIVE: 0
CARDIOVASCULAR NEGATIVE: 0
PSYCHIATRIC NEGATIVE: 0
CONSTITUTIONAL NEGATIVE: 0
EYES NEGATIVE: 0
NEUROLOGICAL NEGATIVE: 0

## 2023-11-20 ASSESSMENT — TONOMETRY
OD_IOP_MMHG: 21
OS_IOP_MMHG: 16
IOP_METHOD: GOLDMANN APPLANATION

## 2023-11-20 ASSESSMENT — PACHYMETRY
OD_CT(UM): 574
OS_CT(UM): 584

## 2023-11-20 ASSESSMENT — SLIT LAMP EXAM - LIDS
COMMENTS: NORMAL
COMMENTS: NORMAL

## 2023-11-20 ASSESSMENT — EXTERNAL EXAM - LEFT EYE: OS_EXAM: NORMAL

## 2023-11-20 ASSESSMENT — EXTERNAL EXAM - RIGHT EYE: OD_EXAM: NORMAL

## 2023-11-20 NOTE — PROGRESS NOTES
ocular htn/glaucoma suspect, both eyes:   new tmax 27 OU   pachs thicker   no evidence of k spindle or TIDS but gonio with c insertion iris and heavily pigmented   no family hx of glaucoma   she does have c/d asymmetry  OCT, Optic Nerve - OU - Both Eyes          Right Eye  Images reviewed and comparison made to baseline.     Left Eye  Images reviewed and comparison made to baseline.     Notes  STABLE OU           HVF 24-2 5/15/23: WNL OU   great reduction with latan qhs, continue for now   goal IOP <21   Iop AND TESTING STABLE   rtc 6 MO FOR hvf 24-2, IOP CHECK    PVD, BOTH  No RT/retinal detachment (RD), S/S d/w patient, to call if they occur  monitor

## 2024-01-16 ENCOUNTER — OFFICE VISIT (OUTPATIENT)
Dept: OBSTETRICS AND GYNECOLOGY | Facility: CLINIC | Age: 59
End: 2024-01-16
Payer: COMMERCIAL

## 2024-01-16 VITALS
BODY MASS INDEX: 19.44 KG/M2 | DIASTOLIC BLOOD PRESSURE: 80 MMHG | HEIGHT: 66 IN | SYSTOLIC BLOOD PRESSURE: 130 MMHG | WEIGHT: 121 LBS

## 2024-01-16 DIAGNOSIS — Z01.419 ENCOUNTER FOR GYNECOLOGICAL EXAMINATION WITHOUT ABNORMAL FINDING: Primary | ICD-10-CM

## 2024-01-16 PROCEDURE — 1036F TOBACCO NON-USER: CPT | Performed by: OBSTETRICS & GYNECOLOGY

## 2024-01-16 PROCEDURE — 3075F SYST BP GE 130 - 139MM HG: CPT | Performed by: OBSTETRICS & GYNECOLOGY

## 2024-01-16 PROCEDURE — 87624 HPV HI-RISK TYP POOLED RSLT: CPT

## 2024-01-16 PROCEDURE — 88142 CYTOPATH C/V THIN LAYER: CPT

## 2024-01-16 PROCEDURE — 3079F DIAST BP 80-89 MM HG: CPT | Performed by: OBSTETRICS & GYNECOLOGY

## 2024-01-16 PROCEDURE — 99396 PREV VISIT EST AGE 40-64: CPT | Performed by: OBSTETRICS & GYNECOLOGY

## 2024-01-16 NOTE — PROGRESS NOTES
Subjective   Amara Cote is a 58 y.o. female here for a routine exam. Current complaints: She is menopausal and has noted night sweats.  No postmenopausal bleeding or discharge.  No dysuria or change in bowel habits.  She is current on her colonoscopy.  She does have hypertension, and is on medication.. Personal health questionnaire reviewed: yes.     Gynecologic History  No LMP recorded (lmp unknown). Patient is postmenopausal.  Contraception: post menopausal status  Last Pap: 12/20/21. Results were: normal  Last mammogram: 10/9/23. Results were: normal    Obstetric History  OB History   No obstetric history on file.       Objective   Constitutional: Alert and in no acute distress. Well developed, well nourished.   Head and Face: Head and face: Normal.    Eyes: Normal external exam - nonicteric sclera, extraocular movements intact (EOMI) and no ptosis.   Neck: No neck asymmetry. Supple. Thyroid not enlarged and there were no palpable thyroid nodules.    Pulmonary: No respiratory distress.   Chest: Breasts: Normal appearance, no nipple discharge and no skin changes. Palpation of breasts and axillae: No palpable mass and no axillary lymphadenopathy.   Abdomen: Soft nontender; no abdominal mass palpated. No organomegaly. No hernias.   Genitourinary: External genitalia: Normal. No inguinal lymphadenopathy. Bartholin's Urethral and Skenes Glands: Normal. Urethra: Normal.  Bladder: Normal on palpation. Vagina: Normal. Cervix: Normal.  Uterus: Normal.  Right Adnexa/parametria: Normal.  Left Adnexa/parametria: Normal.  Inspection of Perianal Area: Normal.   Musculoskeletal: No joint swelling seen, normal movements of all extremities.   Skin: Normal skin color and pigmentation, normal skin turgor, and no rash.   Neurologic: Non-focal. Grossly intact.   Psychiatric: Alert and oriented x 3. Affect normal to patient baseline. Mood: Appropriate.  Physical Exam     Assessment/Plan   Healthy female exam.  This is a  58-year-old female with a normal exam. A Pap was sent.  Her routine mammogram was ordered for October 2024.    She is current on her colonoscopy.  We discussed options for management of menopausal symptoms.  She was given information on Relizen, an over-the-counter product from [employer].  We did briefly discuss Veozah for menopausal symptoms as well.  I will see routinely in 1 year.  Mammogram ordered.

## 2024-01-31 LAB
CYTOLOGY CMNT CVX/VAG CYTO-IMP: NORMAL
HPV HR 12 DNA GENITAL QL NAA+PROBE: NEGATIVE
HPV HR GENOTYPES PNL CVX NAA+PROBE: NEGATIVE
HPV16 DNA SPEC QL NAA+PROBE: NEGATIVE
HPV18 DNA SPEC QL NAA+PROBE: NEGATIVE
LAB AP HPV GENOTYPE QUESTION: YES
LAB AP HPV HR: NORMAL
LABORATORY COMMENT REPORT: NORMAL
LABORATORY COMMENT REPORT: NORMAL
MENSTRUAL HX REPORTED: NORMAL
PATH REPORT.TOTAL CANCER: NORMAL

## 2024-03-07 DIAGNOSIS — I10 PRIMARY HYPERTENSION: ICD-10-CM

## 2024-03-07 RX ORDER — AMLODIPINE BESYLATE 5 MG/1
5 TABLET ORAL DAILY
Qty: 90 TABLET | Refills: 3 | Status: SHIPPED | OUTPATIENT
Start: 2024-03-07 | End: 2025-03-07

## 2024-03-15 ENCOUNTER — TELEPHONE (OUTPATIENT)
Dept: OPHTHALMOLOGY | Facility: CLINIC | Age: 59
End: 2024-03-15
Payer: COMMERCIAL

## 2024-03-19 ENCOUNTER — TELEPHONE (OUTPATIENT)
Dept: OPHTHALMOLOGY | Facility: CLINIC | Age: 59
End: 2024-03-19
Payer: COMMERCIAL

## 2024-05-22 ENCOUNTER — APPOINTMENT (OUTPATIENT)
Dept: OPHTHALMOLOGY | Facility: CLINIC | Age: 59
End: 2024-05-22
Payer: COMMERCIAL

## 2024-06-11 ENCOUNTER — OFFICE VISIT (OUTPATIENT)
Dept: PRIMARY CARE | Facility: CLINIC | Age: 59
End: 2024-06-11
Payer: COMMERCIAL

## 2024-06-11 VITALS
HEIGHT: 66 IN | WEIGHT: 116 LBS | BODY MASS INDEX: 18.64 KG/M2 | RESPIRATION RATE: 20 BRPM | DIASTOLIC BLOOD PRESSURE: 84 MMHG | OXYGEN SATURATION: 96 % | SYSTOLIC BLOOD PRESSURE: 136 MMHG | HEART RATE: 90 BPM

## 2024-06-11 DIAGNOSIS — I10 BENIGN ESSENTIAL HYPERTENSION: Primary | ICD-10-CM

## 2024-06-11 PROCEDURE — 3075F SYST BP GE 130 - 139MM HG: CPT | Performed by: INTERNAL MEDICINE

## 2024-06-11 PROCEDURE — 1036F TOBACCO NON-USER: CPT | Performed by: INTERNAL MEDICINE

## 2024-06-11 PROCEDURE — 99213 OFFICE O/P EST LOW 20 MIN: CPT | Performed by: INTERNAL MEDICINE

## 2024-06-11 PROCEDURE — 3079F DIAST BP 80-89 MM HG: CPT | Performed by: INTERNAL MEDICINE

## 2024-06-11 NOTE — PROGRESS NOTES
"Subjective   Patient ID: Amara Cote is a 58 y.o. female who presents for Follow-up (6 month follow up BP).    Situational stress-Son is in the [employer] in [facility], [facility] -recently got engaged    HPI     PMH:    HTN    Review of Systems    No Fever/chills/headaches/dizziness/chest pains/ cough/ shortness of breath/palpitations/ abdominal pain /Nausea/vomiting/diarrhea/ constipation/urine frequency/blood in urine.      Objective   Pulse 90   Resp 20   Ht 1.676 m (5' 6\")   Wt 52.6 kg (116 lb)   LMP  (LMP Unknown)   SpO2 96%   BMI 18.72 kg/m²     Physical Exam    No JVP elevation. No palpable Lymph Nodes. No Thyromegaly    HEENT- Negative    CVS-NL S1/S2 . No MRG    Lungs-CTA. B/S= B/L    Abdomen-Soft, Non-tender. No masses or HSM    Extremities: No C/C/E    Skin-No abnormal moles/rash      Assessment/Plan        HTN-Goal < 130/80    Discussed cutting back on ETOH intake-    Situational stress- Son/Mom/Work    Continue with current Rx    Follow up in 6 months/PRN      "

## 2024-06-19 ENCOUNTER — APPOINTMENT (OUTPATIENT)
Dept: OPHTHALMOLOGY | Facility: CLINIC | Age: 59
End: 2024-06-19
Payer: COMMERCIAL

## 2024-06-28 ENCOUNTER — APPOINTMENT (OUTPATIENT)
Dept: OPHTHALMOLOGY | Facility: CLINIC | Age: 59
End: 2024-06-28
Payer: COMMERCIAL

## 2024-06-28 DIAGNOSIS — H52.203 MYOPIA OF BOTH EYES WITH ASTIGMATISM AND PRESBYOPIA: Primary | ICD-10-CM

## 2024-06-28 DIAGNOSIS — H52.13 MYOPIA OF BOTH EYES WITH ASTIGMATISM AND PRESBYOPIA: Primary | ICD-10-CM

## 2024-06-28 DIAGNOSIS — H52.4 MYOPIA OF BOTH EYES WITH ASTIGMATISM AND PRESBYOPIA: Primary | ICD-10-CM

## 2024-06-28 DIAGNOSIS — H40.053 BILATERAL OCULAR HYPERTENSION: ICD-10-CM

## 2024-06-28 ASSESSMENT — TONOMETRY
OS_IOP_MMHG: 19
IOP_METHOD: GOLDMANN APPLANATION
OD_IOP_MMHG: 19

## 2024-06-28 ASSESSMENT — ENCOUNTER SYMPTOMS
RESPIRATORY NEGATIVE: 0
EYES NEGATIVE: 0
CONSTITUTIONAL NEGATIVE: 0
HEMATOLOGIC/LYMPHATIC NEGATIVE: 0
PSYCHIATRIC NEGATIVE: 0
MUSCULOSKELETAL NEGATIVE: 0
ALLERGIC/IMMUNOLOGIC NEGATIVE: 0
GASTROINTESTINAL NEGATIVE: 0
ENDOCRINE NEGATIVE: 0
NEUROLOGICAL NEGATIVE: 0
CARDIOVASCULAR NEGATIVE: 0

## 2024-06-28 ASSESSMENT — REFRACTION_CURRENTRX
OD_AXIS: 100
OS_SPHERE: -2.25
OS_DIAMETER: 14.5
OD_DIAMETER: 14.5
OD_ADD: HI
OD_AXIS: 100
OS_DIAMETER: 14.2
OD_AXIS: 100
OS_DIAMETER: 14.5
OS_AXIS: 090
OD_DIAMETER: 14.5
OS_AXIS: 090
OD_DIAMETER: 14.2
OD_BRAND: BIOTRUE 1 DAY MULTIFOCAL
OS_BRAND: BIOTRUE 1 DAY MULTIFOCAL
OS_CYLINDER: SPHERE
OS_SPHERE: -2.00
OS_SPHERE: -2.00
OS_AXIS: 090
OD_ADDL_SPECS: N LENS
OD_ADD: HI
OD_DIAMETER: 14.2
OS_ADD: HI
OD_BASECURVE: 8.7
OD_SPHERE: -2.50
OD_BRAND: BIOFINITY MF TORIC
OS_CYLINDER: -0.75
OD_BASECURVE: 8.6
OS_SPHERE: -2.00
OD_SPHERE: -2.50
OD_CYLINDER: -0.75
OS_BASECURVE: 8.5
OD_BASECURVE: 8.5
OS_ADD: HI
OD_CYLINDER: SPHERE
OS_CYLINDER: -1.25
OS_ADD: HI D
OD_SPHERE: -2.25
OS_ADD: HI
OS_DIAMETER: 14.2
OS_ADDL_SPECS: D LENS
OS_CYLINDER: -1.25
OS_BASECURVE: 8.5
OS_BASECURVE: 8.7
OS_BASECURVE: 8.6
OD_ADD: HI
OD_SPHERE: -2.25
OD_BASECURVE: 8.5
OD_CYLINDER: -0.75
OD_CYLINDER: -0.75
OS_BRAND: BIOFINITY MF TORIC

## 2024-06-28 ASSESSMENT — CONF VISUAL FIELD
OS_INFERIOR_TEMPORAL_RESTRICTION: 0
OS_SUPERIOR_TEMPORAL_RESTRICTION: 0
OD_NORMAL: 1
OD_INFERIOR_TEMPORAL_RESTRICTION: 0
OS_INFERIOR_NASAL_RESTRICTION: 0
OD_SUPERIOR_NASAL_RESTRICTION: 0
METHOD: COUNTING FINGERS
OS_NORMAL: 1
OD_INFERIOR_NASAL_RESTRICTION: 0
OS_SUPERIOR_NASAL_RESTRICTION: 0
OD_SUPERIOR_TEMPORAL_RESTRICTION: 0

## 2024-06-28 ASSESSMENT — VISUAL ACUITY
OD_CC+: -1
OD_CC: 20/20
OS_SC: 20/20-1
OS_CC: 20/25
OD_SC: 20/20-1
METHOD: SNELLEN - LINEAR
CORRECTION_TYPE: GLASSES
VA_OR_OS_CURRENT_RX: 20/25+2
VA_OR_OD_CURRENT_RX: 20/20-2

## 2024-06-28 ASSESSMENT — SLIT LAMP EXAM - LIDS
COMMENTS: NORMAL
COMMENTS: NORMAL

## 2024-06-28 ASSESSMENT — REFRACTION_WEARINGRX
OS_ADD: +1.50
OD_CYLINDER: -1.25
OD_ADD: +1.50
OD_AXIS: 100
OD_SPHERE: -2.50
OS_CYLINDER: -1.25
OS_AXIS: 090
OS_SPHERE: -2.50

## 2024-06-28 ASSESSMENT — PACHYMETRY
OS_CT(UM): 584
OD_CT(UM): 574

## 2024-06-28 ASSESSMENT — REFRACTION_MANIFEST
OS_ADD: +2.50
OD_ADD: +2.50
OS_SPHERE: -2.00
OS_AXIS: 090
OD_CYLINDER: -1.00
OS_CYLINDER: -1.50
OD_SPHERE: -2.25
OD_AXIS: 105

## 2024-06-28 ASSESSMENT — EXTERNAL EXAM - RIGHT EYE: OD_EXAM: NORMAL

## 2024-06-28 ASSESSMENT — EXTERNAL EXAM - LEFT EYE: OS_EXAM: NORMAL

## 2024-06-28 NOTE — Clinical Note
Both eyes (OU) Contact Lens Order  Quantity: 1 Package: TRIAL Appointment needed? No Medically necessary? No Ship To: [facility] Additional instructions: order RX3 and RX4, can mail the UltraToric MF direct to patient but needs appt to  the Biofinity MF Toric

## 2024-06-28 NOTE — PROGRESS NOTES
Assessment/Plan   Diagnoses and all orders for this visit:  Myopia of both eyes with astigmatism and presbyopia  New sprx given per patient's request. Pt trialed in BioTrue spherical MF 1 day but vision was poor due to no astigmatism correction. Will order bioinfinity toric MF with Distance in the left eye and near in the right eye, order rx 3. Pt is out of contacts completely, will also order ultra MF with a new prescription to be mailed to the patient, order rx 4.     Bilateral ocular hypertension  Intraocular pressure (IOP) today was 19 OU. Continue monitoring with Dr. Rosario.

## 2024-08-02 ENCOUNTER — APPOINTMENT (OUTPATIENT)
Dept: OPHTHALMOLOGY | Facility: CLINIC | Age: 59
End: 2024-08-02
Payer: COMMERCIAL

## 2024-08-02 DIAGNOSIS — H52.13 MYOPIA OF BOTH EYES WITH ASTIGMATISM AND PRESBYOPIA: Primary | ICD-10-CM

## 2024-08-02 DIAGNOSIS — H52.4 MYOPIA OF BOTH EYES WITH ASTIGMATISM AND PRESBYOPIA: Primary | ICD-10-CM

## 2024-08-02 DIAGNOSIS — H52.203 MYOPIA OF BOTH EYES WITH ASTIGMATISM AND PRESBYOPIA: Primary | ICD-10-CM

## 2024-08-02 ASSESSMENT — REFRACTION_CURRENTRX
OD_SPHERE: -2.25
OS_CYLINDER: -1.25
OD_ADDL_SPECS: N LENS
OD_BASECURVE: 8.7
OS_BASECURVE: 8.7
OS_ADDL_SPECS: D LENS
OD_CYLINDER: -0.75
OS_ADD: HI D
OD_BRAND: BIOFINITY MF TORIC
OS_SPHERE: -2.00
OS_BRAND: BIOFINITY MF TORIC
OD_DIAMETER: 14.5
OS_AXIS: 090
OS_DIAMETER: 14.5
OD_AXIS: 100

## 2024-08-02 ASSESSMENT — VISUAL ACUITY
CORRECTION_TYPE: CONTACTS
OS_CC: 20/25
OD_CC+: -2
OS_CC+: +2
METHOD: SNELLEN - LINEAR
OD_CC: 20/20

## 2024-08-02 ASSESSMENT — ENCOUNTER SYMPTOMS
MUSCULOSKELETAL NEGATIVE: 0
HEMATOLOGIC/LYMPHATIC NEGATIVE: 0
CARDIOVASCULAR NEGATIVE: 0
ALLERGIC/IMMUNOLOGIC NEGATIVE: 0
GASTROINTESTINAL NEGATIVE: 0
ENDOCRINE NEGATIVE: 0
NEUROLOGICAL NEGATIVE: 0
EYES NEGATIVE: 0
RESPIRATORY NEGATIVE: 0
PSYCHIATRIC NEGATIVE: 0
CONSTITUTIONAL NEGATIVE: 0

## 2024-08-02 ASSESSMENT — SLIT LAMP EXAM - LIDS
COMMENTS: NORMAL
COMMENTS: NORMAL

## 2024-08-02 ASSESSMENT — EXTERNAL EXAM - RIGHT EYE: OD_EXAM: NORMAL

## 2024-08-02 ASSESSMENT — PACHYMETRY
OD_CT(UM): 574
OS_CT(UM): 584

## 2024-08-02 ASSESSMENT — EXTERNAL EXAM - LEFT EYE: OS_EXAM: NORMAL

## 2024-08-02 NOTE — PROGRESS NOTES
Assessment/Plan   Diagnoses and all orders for this visit:  Myopia of both eyes with astigmatism and presbyopia    Dispense new CL RX  Amara Caballero can call to finalize  Either Ultra or Biofinity Toric Multifocals are acceptable  Patient can choose and call with best brand and order

## 2024-08-14 ENCOUNTER — APPOINTMENT (OUTPATIENT)
Dept: OPHTHALMOLOGY | Facility: CLINIC | Age: 59
End: 2024-08-14
Payer: COMMERCIAL

## 2024-08-14 DIAGNOSIS — H40.001 GLAUCOMA SUSPECT OF RIGHT EYE: Primary | ICD-10-CM

## 2024-08-14 DIAGNOSIS — H40.053 BILATERAL OCULAR HYPERTENSION: ICD-10-CM

## 2024-08-14 PROCEDURE — 99214 OFFICE O/P EST MOD 30 MIN: CPT | Performed by: OPHTHALMOLOGY

## 2024-08-14 PROCEDURE — 92083 EXTENDED VISUAL FIELD XM: CPT | Performed by: OPHTHALMOLOGY

## 2024-08-14 RX ORDER — ELECTROLYTES/DEXTROSE
SOLUTION, ORAL ORAL
COMMUNITY

## 2024-08-14 RX ORDER — HYDROCORTISONE 25 MG/G
CREAM TOPICAL
COMMUNITY
Start: 2024-02-02

## 2024-08-14 RX ORDER — DOXYCYCLINE 50 MG/1
CAPSULE ORAL
COMMUNITY
Start: 2024-04-25

## 2024-08-14 RX ORDER — OXYMETAZOLINE HYDROCHLORIDE 1 G/100G
CREAM TOPICAL
COMMUNITY
Start: 2024-05-15

## 2024-08-14 RX ORDER — LATANOPROST 50 UG/ML
1 SOLUTION/ DROPS OPHTHALMIC NIGHTLY
Qty: 7.5 ML | Refills: 3 | Status: SHIPPED | OUTPATIENT
Start: 2024-08-14 | End: 2025-08-14

## 2024-08-14 RX ORDER — METRONIDAZOLE 7.5 MG/G
CREAM TOPICAL
COMMUNITY
Start: 2024-02-02

## 2024-08-14 ASSESSMENT — CONF VISUAL FIELD
OD_NORMAL: 1
OD_INFERIOR_TEMPORAL_RESTRICTION: 0
OD_SUPERIOR_NASAL_RESTRICTION: 0
OS_NORMAL: 1
OS_SUPERIOR_TEMPORAL_RESTRICTION: 0
OS_INFERIOR_TEMPORAL_RESTRICTION: 0
OD_SUPERIOR_TEMPORAL_RESTRICTION: 0
OS_SUPERIOR_NASAL_RESTRICTION: 0
OS_INFERIOR_NASAL_RESTRICTION: 0
OD_INFERIOR_NASAL_RESTRICTION: 0

## 2024-08-14 ASSESSMENT — ENCOUNTER SYMPTOMS
MUSCULOSKELETAL NEGATIVE: 0
ENDOCRINE NEGATIVE: 0
HEMATOLOGIC/LYMPHATIC NEGATIVE: 0
EYES NEGATIVE: 1
NEUROLOGICAL NEGATIVE: 0
PSYCHIATRIC NEGATIVE: 0
RESPIRATORY NEGATIVE: 0
ALLERGIC/IMMUNOLOGIC NEGATIVE: 0
CONSTITUTIONAL NEGATIVE: 0
CARDIOVASCULAR NEGATIVE: 0
GASTROINTESTINAL NEGATIVE: 0

## 2024-08-14 ASSESSMENT — PACHYMETRY
OS_CT(UM): 584
OD_CT(UM): 574

## 2024-08-14 ASSESSMENT — REFRACTION_WEARINGRX
OD_SPHERE: -2.25
OD_CYLINDER: -1.00
OS_ADD: +2.50
OD_AXIS: 105
OD_ADD: +2.50
OS_CYLINDER: -1.50
OS_SPHERE: -2.00
OS_AXIS: 090

## 2024-08-14 ASSESSMENT — EXTERNAL EXAM - LEFT EYE: OS_EXAM: NORMAL

## 2024-08-14 ASSESSMENT — SLIT LAMP EXAM - LIDS
COMMENTS: NORMAL
COMMENTS: NORMAL

## 2024-08-14 ASSESSMENT — TONOMETRY
IOP_METHOD: GOLDMANN APPLANATION
OS_IOP_MMHG: 20
OD_IOP_MMHG: 21

## 2024-08-14 ASSESSMENT — VISUAL ACUITY
OD_CC: 20/20
OS_CC: 20/20
METHOD: SNELLEN - LINEAR
OS_CC+: -2
CORRECTION_TYPE: GLASSES

## 2024-08-14 ASSESSMENT — EXTERNAL EXAM - RIGHT EYE: OD_EXAM: NORMAL

## 2024-08-14 NOTE — PROGRESS NOTES
ocular htn/glaucoma suspect, both eyes:   new tmax 27 OU   pachs thicker   no evidence of k spindle or TIDS but gonio with c insertion iris and heavily pigmented   no family hx of glaucoma   she does have c/d asymmetry  OCT, Optic Nerve - OU - Both Eyes          Right Eye  Images reviewed and comparison made to baseline.     Left Eye  Images reviewed and comparison made to baseline.     Notes  STABLE OU          [place] [facility] - OU - Both Eyes          Wnl OU           great reduction with latan qhs, continue for now   goal IOP <21   Iop AND TESTING STABLE   rtc 6 MO FOR DFE/OCT RNFL    PVD, BOTH  No RT/retinal detachment (RD), S/S d/w patient, to call if they occur  monitor

## 2024-08-20 ENCOUNTER — TELEPHONE (OUTPATIENT)
Dept: OPHTHALMOLOGY | Facility: CLINIC | Age: 59
End: 2024-08-20
Payer: COMMERCIAL

## 2024-08-30 ENCOUNTER — TELEPHONE (OUTPATIENT)
Dept: OPHTHALMOLOGY | Facility: CLINIC | Age: 59
End: 2024-08-30
Payer: COMMERCIAL

## 2024-09-11 DIAGNOSIS — Z00.00 ANNUAL PHYSICAL EXAM: ICD-10-CM

## 2024-09-17 ENCOUNTER — OFFICE VISIT (OUTPATIENT)
Dept: URGENT CARE | Age: 59
End: 2024-09-17
Payer: COMMERCIAL

## 2024-09-17 VITALS
BODY MASS INDEX: 18.88 KG/M2 | SYSTOLIC BLOOD PRESSURE: 157 MMHG | WEIGHT: 117 LBS | RESPIRATION RATE: 17 BRPM | OXYGEN SATURATION: 100 % | TEMPERATURE: 98 F | DIASTOLIC BLOOD PRESSURE: 90 MMHG | HEART RATE: 87 BPM

## 2024-09-17 DIAGNOSIS — M25.461 EFFUSION OF RIGHT KNEE: ICD-10-CM

## 2024-09-17 DIAGNOSIS — S89.91XA INJURY OF RIGHT KNEE, INITIAL ENCOUNTER: ICD-10-CM

## 2024-09-17 DIAGNOSIS — S89.91XA INJURY OF RIGHT KNEE, INITIAL ENCOUNTER: Primary | ICD-10-CM

## 2024-09-17 DIAGNOSIS — S82.141A CLOSED FRACTURE OF RIGHT TIBIAL PLATEAU, INITIAL ENCOUNTER: ICD-10-CM

## 2024-09-17 RX ORDER — ACETAMINOPHEN 500 MG
1000 TABLET ORAL ONCE
Status: SHIPPED | OUTPATIENT
Start: 2024-09-17

## 2024-09-17 ASSESSMENT — ENCOUNTER SYMPTOMS
JOINT SWELLING: 1
ARTHRALGIAS: 1

## 2024-09-17 NOTE — PATIENT INSTRUCTIONS
Dr. Shaw Cole [phone]  Please follow up tomorrow, 9/18/24, he will be contacting you  NON WEIGHT BEARING of the right lower extremity

## 2024-09-17 NOTE — PROGRESS NOTES
Subjective   Patient ID: Amara Cote is a 59 y.o. female. They present today with a chief complaint of Injury (Right knee injury ).    History of Present Illness  Pt presents with R knee injury. States prior to arrival she was walking into [email or web address] and fell either on water or the carpet. Landed on R knee. +swelling. Pain with bearing weight and extension at the knee. She has been bearing weight since incident. No meds given pta. No hx of prior injury to the knee. Denies numbness tingling or weakness. No headstrike or other injury in the fall.       Injury      Past Medical History  Allergies as of 09/17/2024 - Reviewed 09/17/2024   Allergen Reaction Noted    Ibuprofen Unknown 01/31/2023       (Not in a hospital admission)       Past Medical History:   Diagnosis Date    Dry eye syndrome of bilateral lacrimal glands 11/18/2022    Bilateral dry eyes    Encounter for screening for malignant neoplasm of colon 10/17/2018    Colon cancer screening    Other chest pain 12/21/2015    Atypical chest pain    Other specified disorders of eye and adnexa 04/03/2019    Eye irritation    Other specified health status 08/29/2017    Birth control    Personal history of other endocrine, nutritional and metabolic disease 10/10/2018    History of hypothyroidism    Personal history of other endocrine, nutritional and metabolic disease 10/22/2018    History of high cholesterol    Personal history of other endocrine, nutritional and metabolic disease 09/30/2019    History of hypothyroidism    Personal history of other infectious and parasitic diseases     History of herpes zoster    Zoster with other complications 11/18/2022    Herpes zoster dermatitis       Past Surgical History:   Procedure Laterality Date    MOUTH SURGERY  09/09/2014    Oral Surgery Tooth Extraction    TONSILLECTOMY  09/09/2014    Tonsillectomy With Adenoidectomy        reports that she has never smoked. She has never used smokeless tobacco. She reports  current alcohol use of about 7.0 standard drinks of alcohol per week. She reports that she does not use drugs.    Review of Systems  Review of Systems   Musculoskeletal:  Positive for arthralgias and joint swelling.   All other systems reviewed and are negative.                                 Objective    Vitals:    09/17/24 1801   BP: 157/90   Pulse: 87   Resp: 17   Temp: 36.7 °C (98 °F)   SpO2: 100%   Weight: 53.1 kg (117 lb)     No LMP recorded (lmp unknown). Patient is postmenopausal.    Physical Exam  Vitals and nursing note reviewed.   Constitutional:       General: She is not in acute distress.     Appearance: Normal appearance. She is not ill-appearing, toxic-appearing or diaphoretic.   Musculoskeletal:      Right hip: Normal.      Left hip: Normal.      Right upper leg: Normal.      Left upper leg: Normal.      Right knee: Swelling and effusion present. No deformity, erythema, ecchymosis, lacerations, bony tenderness or crepitus. Normal range of motion. Tenderness (with full flexion only) present over the lateral joint line. No ACL laxity. Normal alignment, normal meniscus and normal patellar mobility.      Instability Tests: Anterior drawer test negative.      Right lower leg: Normal.      Left lower leg: Normal.      Right ankle: Normal.      Left ankle: Normal.      Right foot: Normal.      Left foot: Normal.   Skin:     Capillary Refill: Capillary refill takes less than 2 seconds.   Neurological:      Mental Status: She is alert.         Procedures    Point of Care Test & Imaging Results from this visit  No results found for this visit on 09/17/24.   XR knee right 4+ views    Result Date: 9/17/2024  Interpreted By:  Jaxon Lynne, STUDY: XR KNEE RIGHT 4+ VIEWS; ;  9/17/2024 6:26 pm   INDICATION: Signs/Symptoms:right knee injury.   ,S89.91XA Unspecified injury of right lower leg, initial encounter   COMPARISON: November 3, 2021 radiographs   ACCESSION NUMBER(S): [id number]   ORDERING CLINICIAN:  DIANNE YU   FINDINGS: Four views demonstrate acute depressed lateral tibial plateau fracture without well-delineated associated split, favoring Schatzker type 3. There is a corresponding large effusion.       Acute depressed lateral tibial plateau fracture, Schatzker type 3 with large effusion.     MACRO: None   Signed by: Jaxon Lynne 9/17/2024 6:41 PM Dictation workstation:   [id number]     Diagnostic study results (if any) were reviewed by Dianne Yu PA-C.    Assessment/Plan   Allergies, medications, history, and pertinent labs/EKGs/Imaging reviewed by Dianne Yu PA-C.     Medical Decision Making      Orders and Diagnoses  Diagnoses and all orders for this visit:  Injury of right knee, initial encounter  -     XR knee right 4+ views; Future  -     acetaminophen (Tylenol) tablet 1,000 mg  -     Knee immobilizer  Closed fracture of right tibial plateau, initial encounter  -     Referral to Orthopaedic Surgery; Future  -     Crutches  Effusion of right knee    Have been in contact with pt pcp during visit, Dr. Torres, who spoke with ortho Dr. Shaw Cole. Dr. Cole will be contacting patient tomorrow to arrange follow up, I advised within 2-7 days. I did also place a referral to orthopedics. Pt advised Non weight bearing RLE. Provided knee immobilizer and crutches.     Medical Admin Record      Follow Up Instructions  No follow-ups on file.    Patient disposition: Home    Electronically signed by Dianne Yu PA-C  7:26 PM

## 2024-09-18 ENCOUNTER — TELEPHONE (OUTPATIENT)
Facility: CLINIC | Age: 59
End: 2024-09-18
Payer: COMMERCIAL

## 2024-09-18 DIAGNOSIS — S82.101A CLOSED FRACTURE OF PROXIMAL END OF RIGHT TIBIA, UNSPECIFIED FRACTURE MORPHOLOGY, INITIAL ENCOUNTER: Primary | ICD-10-CM

## 2024-09-18 RX ORDER — TRAMADOL HYDROCHLORIDE 50 MG/1
50 TABLET ORAL EVERY 6 HOURS PRN
Qty: 28 TABLET | Refills: 0 | Status: SHIPPED | OUTPATIENT
Start: 2024-09-18 | End: 2024-09-25

## 2024-09-18 NOTE — TELEPHONE ENCOUNTER
R Tibial plateua fracture with effusion- Seen at  Urgent Care -Treated with compression, immobilization and crutches. Discussed with Dr Cole in trauma surgery who will follow up with Ms Cote to discuss management/treatment.

## 2024-09-19 ENCOUNTER — OFFICE VISIT (OUTPATIENT)
Dept: ORTHOPEDIC SURGERY | Facility: CLINIC | Age: 59
End: 2024-09-19
Payer: COMMERCIAL

## 2024-09-19 ENCOUNTER — HOSPITAL ENCOUNTER (OUTPATIENT)
Dept: RADIOLOGY | Facility: HOSPITAL | Age: 59
Discharge: HOME | End: 2024-09-19
Payer: COMMERCIAL

## 2024-09-19 DIAGNOSIS — S82.141A TIBIAL PLATEAU FRACTURE, RIGHT, CLOSED, INITIAL ENCOUNTER: ICD-10-CM

## 2024-09-19 PROCEDURE — 1036F TOBACCO NON-USER: CPT | Performed by: ORTHOPAEDIC SURGERY

## 2024-09-19 PROCEDURE — 73700 CT LOWER EXTREMITY W/O DYE: CPT | Mod: RT

## 2024-09-19 PROCEDURE — 99214 OFFICE O/P EST MOD 30 MIN: CPT | Performed by: ORTHOPAEDIC SURGERY

## 2024-09-20 NOTE — PROGRESS NOTES
Chief complaint I injured my right knee on Tuesday.    History is a 59-year-old female who is very healthy she walks 5 to 6 miles at least 3 times a week and is very active with this.  She was apparently in a winking [employer] restaurant and slipped on the floor and went down.  She apparently was walking on this very judiciously afterward to get in the car she was taken to  urgent care where x-rays were taken which revealed a acute displaced depressed lateral tibial plateau fracture.  I was asked by  to look at her x-rays and she indeed had a depressed lateral plateau fracture that in my opinion needed surgical intervention.  This was obviously not urgent as she was placed in the best brace they could find which was not good given crutches though and kept nonweightbearing.  She follows up in my office today.    Her physical examination reveals a very pleasant 59-year-old female who appears much younger than her stated age.  She is 117 pounds, 5 foot 6 inches tall with a BMI of 19.  She is oriented x 3.  Her head is atraumatic and normocephalic her respirations are unlabored and regular with no audible wheezing.  Heart is a regular rate and rhythm by peripheral pulses her abdomen is thin.    Her musculoskeletal exam reveals her right knee is in slight valgus.  She has about 2-3+ swelling laterally.  Very tender over the lateral joint and very difficult to move the knee.  I could not really do a ligament exam on her today because of pain but grossly her MCL appears to be intact.  She has a negative Homans' sign 2+ dorsalis pedis and posterior tibial pulse.    X-rays were evaluated today in the office which show a lateral depressed tibial plateau fracture.  From x-rays today she appears to have some osteoporosis around the knee as well.    Assessment lateral depressed tibial plateau fracture probably a Schatzker 2 closed moderate swelling at this time.    Plan I talked to her about the risks and benefits of  open reduction internal fixation of the lateral plateau I described the fact we have to elevate that section and put up scaffolding plate underneath that section to keep it elevated we may or may not need to use bone graft we probably will expose the lateral joint and if the lateral meniscus is torn we will repair that as well.  I would like to get a stat CT scan because I do believe this will help us with the overall plan and assess the amount of depression and location of the lateral depression.  So she got that today.    I saw her back when she had the CT scan and indeed she has a lateral depressed fragment which is about a third of the lateral plateau it is down at least 8 mm.  We put her on my surgery schedule at my first available date opening which is 2 weeks.  I think this will help the soft tissue swelling improved anyway.  I have also put her in a much better hinged roadrunner knee brace.  She can use it when she is ambulatory and remove it during sleeping hours and in the shower.  Risks and benefits of open external fixation discussed with her and she did wish to proceed.  No major medical problems standard preoperative testing

## 2024-09-20 NOTE — H&P (VIEW-ONLY)
Chief complaint I injured my right knee on Tuesday.    History is a 59-year-old female who is very healthy she walks 5 to 6 miles at least 3 times a week and is very active with this.  She was apparently in a winking [employer] restaurant and slipped on the floor and went down.  She apparently was walking on this very judiciously afterward to get in the car she was taken to  urgent care where x-rays were taken which revealed a acute displaced depressed lateral tibial plateau fracture.  I was asked by  to look at her x-rays and she indeed had a depressed lateral plateau fracture that in my opinion needed surgical intervention.  This was obviously not urgent as she was placed in the best brace they could find which was not good given crutches though and kept nonweightbearing.  She follows up in my office today.    Her physical examination reveals a very pleasant 59-year-old female who appears much younger than her stated age.  She is 117 pounds, 5 foot 6 inches tall with a BMI of 19.  She is oriented x 3.  Her head is atraumatic and normocephalic her respirations are unlabored and regular with no audible wheezing.  Heart is a regular rate and rhythm by peripheral pulses her abdomen is thin.    Her musculoskeletal exam reveals her right knee is in slight valgus.  She has about 2-3+ swelling laterally.  Very tender over the lateral joint and very difficult to move the knee.  I could not really do a ligament exam on her today because of pain but grossly her MCL appears to be intact.  She has a negative Homans' sign 2+ dorsalis pedis and posterior tibial pulse.    X-rays were evaluated today in the office which show a lateral depressed tibial plateau fracture.  From x-rays today she appears to have some osteoporosis around the knee as well.    Assessment lateral depressed tibial plateau fracture probably a Schatzker 2 closed moderate swelling at this time.    Plan I talked to her about the risks and benefits of  open reduction internal fixation of the lateral plateau I described the fact we have to elevate that section and put up scaffolding plate underneath that section to keep it elevated we may or may not need to use bone graft we probably will expose the lateral joint and if the lateral meniscus is torn we will repair that as well.  I would like to get a stat CT scan because I do believe this will help us with the overall plan and assess the amount of depression and location of the lateral depression.  So she got that today.    I saw her back when she had the CT scan and indeed she has a lateral depressed fragment which is about a third of the lateral plateau it is down at least 8 mm.  We put her on my surgery schedule at my first available date opening which is 2 weeks.  I think this will help the soft tissue swelling improved anyway.  I have also put her in a much better hinged roadrunner knee brace.  She can use it when she is ambulatory and remove it during sleeping hours and in the shower.  Risks and benefits of open external fixation discussed with her and she did wish to proceed.  No major medical problems standard preoperative testing

## 2024-09-23 DIAGNOSIS — S82.141A TIBIAL PLATEAU FRACTURE, RIGHT, CLOSED, INITIAL ENCOUNTER: ICD-10-CM

## 2024-09-24 ENCOUNTER — LAB (OUTPATIENT)
Dept: LAB | Facility: LAB | Age: 59
End: 2024-09-24
Payer: COMMERCIAL

## 2024-09-24 DIAGNOSIS — S82.141A TIBIAL PLATEAU FRACTURE, RIGHT, CLOSED, INITIAL ENCOUNTER: ICD-10-CM

## 2024-09-24 DIAGNOSIS — Z00.00 ANNUAL PHYSICAL EXAM: ICD-10-CM

## 2024-09-24 LAB
25(OH)D3 SERPL-MCNC: 60 NG/ML (ref 30–100)
ALBUMIN SERPL BCP-MCNC: 4.3 G/DL (ref 3.4–5)
ALBUMIN SERPL BCP-MCNC: 4.8 G/DL (ref 3.4–5)
ALP SERPL-CCNC: 54 U/L (ref 33–110)
ALP SERPL-CCNC: 58 U/L (ref 33–110)
ALT SERPL W P-5'-P-CCNC: 12 U/L (ref 7–45)
ALT SERPL W P-5'-P-CCNC: 12 U/L (ref 7–45)
ANION GAP SERPL CALC-SCNC: 13 MMOL/L (ref 10–20)
ANION GAP SERPL CALC-SCNC: 15 MMOL/L (ref 10–20)
APTT PPP: 33 SECONDS (ref 27–38)
AST SERPL W P-5'-P-CCNC: 22 U/L (ref 9–39)
AST SERPL W P-5'-P-CCNC: 23 U/L (ref 9–39)
BASOPHILS # BLD AUTO: 0.02 X10*3/UL (ref 0–0.1)
BASOPHILS NFR BLD AUTO: 0.3 %
BILIRUB SERPL-MCNC: 0.5 MG/DL (ref 0–1.2)
BILIRUB SERPL-MCNC: 0.5 MG/DL (ref 0–1.2)
BUN SERPL-MCNC: 10 MG/DL (ref 6–23)
BUN SERPL-MCNC: 11 MG/DL (ref 6–23)
CALCIUM SERPL-MCNC: 10 MG/DL (ref 8.6–10.3)
CALCIUM SERPL-MCNC: 9.6 MG/DL (ref 8.6–10.3)
CHLORIDE SERPL-SCNC: 96 MMOL/L (ref 98–107)
CHLORIDE SERPL-SCNC: 97 MMOL/L (ref 98–107)
CO2 SERPL-SCNC: 27 MMOL/L (ref 21–32)
CO2 SERPL-SCNC: 27 MMOL/L (ref 21–32)
CREAT SERPL-MCNC: 0.8 MG/DL (ref 0.5–1.05)
CREAT SERPL-MCNC: 0.82 MG/DL (ref 0.5–1.05)
CRP SERPL-MCNC: 0.61 MG/DL
EGFRCR SERPLBLD CKD-EPI 2021: 84 ML/MIN/1.73M*2
EGFRCR SERPLBLD CKD-EPI 2021: 86 ML/MIN/1.73M*2
EOSINOPHIL # BLD AUTO: 0.07 X10*3/UL (ref 0–0.7)
EOSINOPHIL NFR BLD AUTO: 0.9 %
ERYTHROCYTE [DISTWIDTH] IN BLOOD BY AUTOMATED COUNT: 11.6 % (ref 11.5–14.5)
EST. AVERAGE GLUCOSE BLD GHB EST-MCNC: 97 MG/DL
GLUCOSE SERPL-MCNC: 97 MG/DL (ref 74–99)
GLUCOSE SERPL-MCNC: 99 MG/DL (ref 74–99)
HBA1C MFR BLD: 5 %
HCT VFR BLD AUTO: 39.6 % (ref 36–46)
HGB BLD-MCNC: 13.5 G/DL (ref 12–16)
IMM GRANULOCYTES # BLD AUTO: 0.02 X10*3/UL (ref 0–0.7)
IMM GRANULOCYTES NFR BLD AUTO: 0.3 % (ref 0–0.9)
INR PPP: 1.1 (ref 0.9–1.1)
LYMPHOCYTES # BLD AUTO: 1.4 X10*3/UL (ref 1.2–4.8)
LYMPHOCYTES NFR BLD AUTO: 18 %
MCH RBC QN AUTO: 31.4 PG (ref 26–34)
MCHC RBC AUTO-ENTMCNC: 34.1 G/DL (ref 32–36)
MCV RBC AUTO: 92 FL (ref 80–100)
MONOCYTES # BLD AUTO: 0.67 X10*3/UL (ref 0.1–1)
MONOCYTES NFR BLD AUTO: 8.6 %
NEUTROPHILS # BLD AUTO: 5.6 X10*3/UL (ref 1.2–7.7)
NEUTROPHILS NFR BLD AUTO: 71.9 %
NRBC BLD-RTO: 0 /100 WBCS (ref 0–0)
PLATELET # BLD AUTO: 377 X10*3/UL (ref 150–450)
POTASSIUM SERPL-SCNC: 4.2 MMOL/L (ref 3.5–5.3)
POTASSIUM SERPL-SCNC: 4.3 MMOL/L (ref 3.5–5.3)
PROT SERPL-MCNC: 7.2 G/DL (ref 6.4–8.2)
PROT SERPL-MCNC: 7.6 G/DL (ref 6.4–8.2)
PROTHROMBIN TIME: 11.9 SECONDS (ref 9.8–12.8)
RBC # BLD AUTO: 4.3 X10*6/UL (ref 4–5.2)
SODIUM SERPL-SCNC: 133 MMOL/L (ref 136–145)
SODIUM SERPL-SCNC: 134 MMOL/L (ref 136–145)
TSH SERPL-ACNC: 2.09 MIU/L (ref 0.44–3.98)
WBC # BLD AUTO: 7.8 X10*3/UL (ref 4.4–11.3)

## 2024-09-24 PROCEDURE — 85730 THROMBOPLASTIN TIME PARTIAL: CPT

## 2024-09-24 PROCEDURE — 82306 VITAMIN D 25 HYDROXY: CPT

## 2024-09-24 PROCEDURE — 83036 HEMOGLOBIN GLYCOSYLATED A1C: CPT

## 2024-09-24 PROCEDURE — 86140 C-REACTIVE PROTEIN: CPT

## 2024-09-24 PROCEDURE — 36415 COLL VENOUS BLD VENIPUNCTURE: CPT

## 2024-09-24 PROCEDURE — 85025 COMPLETE CBC W/AUTO DIFF WBC: CPT

## 2024-09-24 PROCEDURE — 85610 PROTHROMBIN TIME: CPT

## 2024-09-24 PROCEDURE — 80053 COMPREHEN METABOLIC PANEL: CPT

## 2024-09-24 PROCEDURE — 84443 ASSAY THYROID STIM HORMONE: CPT

## 2024-09-26 ENCOUNTER — ANESTHESIA EVENT (OUTPATIENT)
Dept: OPERATING ROOM | Facility: HOSPITAL | Age: 59
DRG: 494 | End: 2024-09-26
Payer: COMMERCIAL

## 2024-09-26 NOTE — PROGRESS NOTES
Pharmacy Medication History Review    Amara Cote is a 59 y.o. female who is planned to be admitted for Tibial plateau fracture, right, closed, initial encounter. Pharmacy called the patient prior to their scheduled procedure and reviewed the patient's [id number] medications for accuracy.    Medications ADDED:  none  Medications CHANGED:  none  Medications REMOVED:   Biotin 5mg  Doxycycline mono 50mg  Rhofade 1%  Triamcinolone 0.1%  Valacyclovir 1g    Please review updated prior to admission medication list and comments regarding how patient may be taking medications differently by going to Admission tab --> Admission Orders --> Admit Orders / Review prior to admission medications.     Preferred pharmacy and allergies to be confirmed with patient by nursing the day of procedure.     Sources used to complete the med history include spoke with patient, surescripts, oarrs, care everywhere    Below are additional concerns with the patient's PTA list.  Patient confirmed they only take spironolactone once daily     Swetha Irving    [facility]  Please reach out via Secure Chat for questions

## 2024-09-27 ENCOUNTER — ANESTHESIA (OUTPATIENT)
Dept: OPERATING ROOM | Facility: HOSPITAL | Age: 59
DRG: 494 | End: 2024-09-27
Payer: COMMERCIAL

## 2024-09-27 ENCOUNTER — APPOINTMENT (OUTPATIENT)
Dept: RADIOLOGY | Facility: HOSPITAL | Age: 59
DRG: 494 | End: 2024-09-27
Payer: COMMERCIAL

## 2024-09-27 ENCOUNTER — HOSPITAL ENCOUNTER (OUTPATIENT)
Facility: HOSPITAL | Age: 59
Setting detail: SURGERY ADMIT
Discharge: HOME | DRG: 494 | End: 2024-09-27
Attending: ORTHOPAEDIC SURGERY | Admitting: ORTHOPAEDIC SURGERY
Payer: COMMERCIAL

## 2024-09-27 VITALS
HEART RATE: 79 BPM | DIASTOLIC BLOOD PRESSURE: 74 MMHG | WEIGHT: 117.06 LBS | OXYGEN SATURATION: 99 % | RESPIRATION RATE: 16 BRPM | SYSTOLIC BLOOD PRESSURE: 119 MMHG | HEIGHT: 66 IN | BODY MASS INDEX: 18.81 KG/M2 | TEMPERATURE: 97.5 F

## 2024-09-27 DIAGNOSIS — S82.141A TIBIAL PLATEAU FRACTURE, RIGHT, CLOSED, INITIAL ENCOUNTER: Primary | ICD-10-CM

## 2024-09-27 LAB
ABO GROUP (TYPE) IN BLOOD: NORMAL
ANTIBODY SCREEN: NORMAL
RH FACTOR (ANTIGEN D): NORMAL

## 2024-09-27 PROCEDURE — 7100000009 HC PHASE TWO TIME - INITIAL BASE CHARGE: Performed by: ORTHOPAEDIC SURGERY

## 2024-09-27 PROCEDURE — 2500000004 HC RX 250 GENERAL PHARMACY W/ HCPCS (ALT 636 FOR OP/ED)

## 2024-09-27 PROCEDURE — 2500000005 HC RX 250 GENERAL PHARMACY W/O HCPCS

## 2024-09-27 PROCEDURE — 2500000005 HC RX 250 GENERAL PHARMACY W/O HCPCS: Performed by: ORTHOPAEDIC SURGERY

## 2024-09-27 PROCEDURE — 3700000002 HC GENERAL ANESTHESIA TIME - EACH INCREMENTAL 1 MINUTE: Performed by: ORTHOPAEDIC SURGERY

## 2024-09-27 PROCEDURE — 3600000004 HC OR TIME - INITIAL BASE CHARGE - PROCEDURE LEVEL FOUR: Performed by: ORTHOPAEDIC SURGERY

## 2024-09-27 PROCEDURE — 2500000004 HC RX 250 GENERAL PHARMACY W/ HCPCS (ALT 636 FOR OP/ED): Performed by: STUDENT IN AN ORGANIZED HEALTH CARE EDUCATION/TRAINING PROGRAM

## 2024-09-27 PROCEDURE — 7100000010 HC PHASE TWO TIME - EACH INCREMENTAL 1 MINUTE: Performed by: ORTHOPAEDIC SURGERY

## 2024-09-27 PROCEDURE — 27403 REPAIR OF KNEE CARTILAGE: CPT | Performed by: PHYSICIAN ASSISTANT

## 2024-09-27 PROCEDURE — 3600000009 HC OR TIME - EACH INCREMENTAL 1 MINUTE - PROCEDURE LEVEL FOUR: Performed by: ORTHOPAEDIC SURGERY

## 2024-09-27 PROCEDURE — 0QSG04Z REPOSITION RIGHT TIBIA WITH INTERNAL FIXATION DEVICE, OPEN APPROACH: ICD-10-PCS | Performed by: ORTHOPAEDIC SURGERY

## 2024-09-27 PROCEDURE — 36415 COLL VENOUS BLD VENIPUNCTURE: CPT | Performed by: STUDENT IN AN ORGANIZED HEALTH CARE EDUCATION/TRAINING PROGRAM

## 2024-09-27 PROCEDURE — 99223 1ST HOSP IP/OBS HIGH 75: CPT

## 2024-09-27 PROCEDURE — 2780000003 HC OR 278 NO HCPCS: Performed by: ORTHOPAEDIC SURGERY

## 2024-09-27 PROCEDURE — 7100000001 HC RECOVERY ROOM TIME - INITIAL BASE CHARGE: Performed by: ORTHOPAEDIC SURGERY

## 2024-09-27 PROCEDURE — 2720000007 HC OR 272 NO HCPCS: Performed by: ORTHOPAEDIC SURGERY

## 2024-09-27 PROCEDURE — 7100000002 HC RECOVERY ROOM TIME - EACH INCREMENTAL 1 MINUTE: Performed by: ORTHOPAEDIC SURGERY

## 2024-09-27 PROCEDURE — 97161 PT EVAL LOW COMPLEX 20 MIN: CPT | Mod: GP

## 2024-09-27 PROCEDURE — 2500000004 HC RX 250 GENERAL PHARMACY W/ HCPCS (ALT 636 FOR OP/ED): Performed by: ORTHOPAEDIC SURGERY

## 2024-09-27 PROCEDURE — 97116 GAIT TRAINING THERAPY: CPT | Mod: GP

## 2024-09-27 PROCEDURE — 3700000001 HC GENERAL ANESTHESIA TIME - INITIAL BASE CHARGE: Performed by: ORTHOPAEDIC SURGERY

## 2024-09-27 PROCEDURE — C1713 ANCHOR/SCREW BN/BN,TIS/BN: HCPCS | Performed by: ORTHOPAEDIC SURGERY

## 2024-09-27 PROCEDURE — 27535 TREAT KNEE FRACTURE: CPT | Performed by: PHYSICIAN ASSISTANT

## 2024-09-27 PROCEDURE — 86901 BLOOD TYPING SEROLOGIC RH(D): CPT | Performed by: STUDENT IN AN ORGANIZED HEALTH CARE EDUCATION/TRAINING PROGRAM

## 2024-09-27 PROCEDURE — 1210000001 HC SEMI-PRIVATE ROOM DAILY

## 2024-09-27 PROCEDURE — 2500000001 HC RX 250 WO HCPCS SELF ADMINISTERED DRUGS (ALT 637 FOR MEDICARE OP)

## 2024-09-27 PROCEDURE — 27403 REPAIR OF KNEE CARTILAGE: CPT | Performed by: ORTHOPAEDIC SURGERY

## 2024-09-27 PROCEDURE — 97530 THERAPEUTIC ACTIVITIES: CPT | Mod: GP

## 2024-09-27 PROCEDURE — 27535 TREAT KNEE FRACTURE: CPT | Performed by: ORTHOPAEDIC SURGERY

## 2024-09-27 DEVICE — SCREW LOCKING 3.5 W/RECESS 50: Type: IMPLANTABLE DEVICE | Site: TIBIA | Status: FUNCTIONAL

## 2024-09-27 DEVICE — IMPLANTABLE DEVICE: Type: IMPLANTABLE DEVICE | Site: TIBIA | Status: FUNCTIONAL

## 2024-09-27 DEVICE — SCREW LOCKING 3.5 W/RECESS 30: Type: IMPLANTABLE DEVICE | Site: TIBIA | Status: FUNCTIONAL

## 2024-09-27 DEVICE — SCREW CORTEX 3.5 X 60: Type: IMPLANTABLE DEVICE | Site: TIBIA | Status: FUNCTIONAL

## 2024-09-27 DEVICE — SCREW, CORTICAL, SELF-TAPPING, 3.5 X 24 MM, STAINLESS STEEL: Type: IMPLANTABLE DEVICE | Site: TIBIA | Status: FUNCTIONAL

## 2024-09-27 DEVICE — BONE CHIPS,  CANCELL 30CC 1.7-10MM: Type: IMPLANTABLE DEVICE | Site: TIBIA | Status: FUNCTIONAL

## 2024-09-27 RX ORDER — TOBRAMYCIN 1.2 G/30ML
INJECTION, POWDER, LYOPHILIZED, FOR SOLUTION INTRAVENOUS AS NEEDED
Status: DISCONTINUED | OUTPATIENT
Start: 2024-09-27 | End: 2024-09-27 | Stop reason: HOSPADM

## 2024-09-27 RX ORDER — ONDANSETRON HYDROCHLORIDE 2 MG/ML
4 INJECTION, SOLUTION INTRAVENOUS ONCE AS NEEDED
Status: DISCONTINUED | OUTPATIENT
Start: 2024-09-27 | End: 2024-09-27 | Stop reason: HOSPADM

## 2024-09-27 RX ORDER — OXYCODONE HYDROCHLORIDE 5 MG/1
5 TABLET ORAL EVERY 6 HOURS PRN
Status: DISCONTINUED | OUTPATIENT
Start: 2024-09-27 | End: 2024-09-27 | Stop reason: HOSPADM

## 2024-09-27 RX ORDER — DOCUSATE SODIUM 100 MG/1
100 CAPSULE, LIQUID FILLED ORAL 2 TIMES DAILY PRN
Qty: 30 CAPSULE | Refills: 0 | Status: SHIPPED | OUTPATIENT
Start: 2024-09-27 | End: 2024-10-12

## 2024-09-27 RX ORDER — ROCURONIUM BROMIDE 10 MG/ML
INJECTION, SOLUTION INTRAVENOUS AS NEEDED
Status: DISCONTINUED | OUTPATIENT
Start: 2024-09-27 | End: 2024-09-27

## 2024-09-27 RX ORDER — SODIUM CHLORIDE, SODIUM LACTATE, POTASSIUM CHLORIDE, CALCIUM CHLORIDE 600; 310; 30; 20 MG/100ML; MG/100ML; MG/100ML; MG/100ML
100 INJECTION, SOLUTION INTRAVENOUS CONTINUOUS
Status: DISCONTINUED | OUTPATIENT
Start: 2024-09-27 | End: 2024-09-27 | Stop reason: HOSPADM

## 2024-09-27 RX ORDER — OXYCODONE HYDROCHLORIDE 5 MG/1
10 TABLET ORAL EVERY 4 HOURS PRN
Status: CANCELLED | OUTPATIENT
Start: 2024-09-27

## 2024-09-27 RX ORDER — OXYCODONE HYDROCHLORIDE 5 MG/1
5 TABLET ORAL EVERY 4 HOURS PRN
Status: DISCONTINUED | OUTPATIENT
Start: 2024-09-27 | End: 2024-09-27 | Stop reason: HOSPADM

## 2024-09-27 RX ORDER — HYDROMORPHONE HYDROCHLORIDE 1 MG/ML
0.5 INJECTION, SOLUTION INTRAMUSCULAR; INTRAVENOUS; SUBCUTANEOUS EVERY 5 MIN PRN
Status: DISCONTINUED | OUTPATIENT
Start: 2024-09-27 | End: 2024-09-27 | Stop reason: HOSPADM

## 2024-09-27 RX ORDER — ALBUTEROL SULFATE 0.83 MG/ML
2.5 SOLUTION RESPIRATORY (INHALATION) ONCE AS NEEDED
Status: DISCONTINUED | OUTPATIENT
Start: 2024-09-27 | End: 2024-09-27 | Stop reason: HOSPADM

## 2024-09-27 RX ORDER — METOCLOPRAMIDE HYDROCHLORIDE 5 MG/ML
10 INJECTION INTRAMUSCULAR; INTRAVENOUS ONCE AS NEEDED
Status: DISCONTINUED | OUTPATIENT
Start: 2024-09-27 | End: 2024-09-27 | Stop reason: HOSPADM

## 2024-09-27 RX ORDER — PROPOFOL 10 MG/ML
INJECTION, EMULSION INTRAVENOUS AS NEEDED
Status: DISCONTINUED | OUTPATIENT
Start: 2024-09-27 | End: 2024-09-27

## 2024-09-27 RX ORDER — NALOXONE HYDROCHLORIDE 0.4 MG/ML
0.2 INJECTION, SOLUTION INTRAMUSCULAR; INTRAVENOUS; SUBCUTANEOUS EVERY 5 MIN PRN
Status: CANCELLED | OUTPATIENT
Start: 2024-09-27

## 2024-09-27 RX ORDER — ONDANSETRON HYDROCHLORIDE 2 MG/ML
INJECTION, SOLUTION INTRAVENOUS AS NEEDED
Status: DISCONTINUED | OUTPATIENT
Start: 2024-09-27 | End: 2024-09-27

## 2024-09-27 RX ORDER — VANCOMYCIN HYDROCHLORIDE 1 G/20ML
INJECTION, POWDER, LYOPHILIZED, FOR SOLUTION INTRAVENOUS AS NEEDED
Status: DISCONTINUED | OUTPATIENT
Start: 2024-09-27 | End: 2024-09-27 | Stop reason: HOSPADM

## 2024-09-27 RX ORDER — LIDOCAINE HYDROCHLORIDE 20 MG/ML
INJECTION, SOLUTION INFILTRATION; PERINEURAL AS NEEDED
Status: DISCONTINUED | OUTPATIENT
Start: 2024-09-27 | End: 2024-09-27

## 2024-09-27 RX ORDER — PHENYLEPHRINE HCL IN 0.9% NACL 1 MG/10 ML
SYRINGE (ML) INTRAVENOUS AS NEEDED
Status: DISCONTINUED | OUTPATIENT
Start: 2024-09-27 | End: 2024-09-27

## 2024-09-27 RX ORDER — LIDOCAINE HYDROCHLORIDE 10 MG/ML
0.1 INJECTION, SOLUTION INFILTRATION; PERINEURAL ONCE
Status: CANCELLED | OUTPATIENT
Start: 2024-09-27 | End: 2024-09-27

## 2024-09-27 RX ORDER — HYDRALAZINE HYDROCHLORIDE 20 MG/ML
5 INJECTION INTRAMUSCULAR; INTRAVENOUS EVERY 30 MIN PRN
Status: DISCONTINUED | OUTPATIENT
Start: 2024-09-27 | End: 2024-09-27 | Stop reason: HOSPADM

## 2024-09-27 RX ORDER — OXYCODONE HYDROCHLORIDE 5 MG/1
5 TABLET ORAL EVERY 4 HOURS PRN
Qty: 42 TABLET | Refills: 0 | Status: SHIPPED | OUTPATIENT
Start: 2024-09-27 | End: 2024-10-04

## 2024-09-27 RX ORDER — ROPIVACAINE HYDROCHLORIDE 5 MG/ML
INJECTION, SOLUTION EPIDURAL; INFILTRATION; PERINEURAL AS NEEDED
Status: DISCONTINUED | OUTPATIENT
Start: 2024-09-27 | End: 2024-09-27

## 2024-09-27 RX ORDER — LABETALOL HYDROCHLORIDE 5 MG/ML
5 INJECTION, SOLUTION INTRAVENOUS ONCE AS NEEDED
Status: DISCONTINUED | OUTPATIENT
Start: 2024-09-27 | End: 2024-09-27 | Stop reason: HOSPADM

## 2024-09-27 RX ORDER — ASPIRIN 81 MG/1
81 TABLET ORAL 2 TIMES DAILY
Qty: 84 TABLET | Refills: 0 | Status: SHIPPED | OUTPATIENT
Start: 2024-09-27 | End: 2024-11-08

## 2024-09-27 RX ORDER — CEFAZOLIN 1 G/1
INJECTION, POWDER, FOR SOLUTION INTRAVENOUS AS NEEDED
Status: DISCONTINUED | OUTPATIENT
Start: 2024-09-27 | End: 2024-09-27

## 2024-09-27 RX ORDER — ACETAMINOPHEN 325 MG/1
650 TABLET ORAL EVERY 6 HOURS PRN
Qty: 120 TABLET | Refills: 0 | Status: SHIPPED | OUTPATIENT
Start: 2024-09-27 | End: 2024-10-12

## 2024-09-27 RX ORDER — SODIUM CHLORIDE 0.9 G/100ML
IRRIGANT IRRIGATION AS NEEDED
Status: DISCONTINUED | OUTPATIENT
Start: 2024-09-27 | End: 2024-09-27 | Stop reason: HOSPADM

## 2024-09-27 RX ORDER — ACETAMINOPHEN 325 MG/1
650 TABLET ORAL EVERY 6 HOURS SCHEDULED
Status: CANCELLED | OUTPATIENT
Start: 2024-09-27

## 2024-09-27 RX ORDER — HYDROMORPHONE HYDROCHLORIDE 1 MG/ML
0.2 INJECTION, SOLUTION INTRAMUSCULAR; INTRAVENOUS; SUBCUTANEOUS EVERY 5 MIN PRN
Status: DISCONTINUED | OUTPATIENT
Start: 2024-09-27 | End: 2024-09-27 | Stop reason: HOSPADM

## 2024-09-27 RX ORDER — TRANEXAMIC ACID 100 MG/ML
INJECTION, SOLUTION INTRAVENOUS AS NEEDED
Status: DISCONTINUED | OUTPATIENT
Start: 2024-09-27 | End: 2024-09-27

## 2024-09-27 RX ORDER — FENTANYL CITRATE 50 UG/ML
INJECTION, SOLUTION INTRAMUSCULAR; INTRAVENOUS AS NEEDED
Status: DISCONTINUED | OUTPATIENT
Start: 2024-09-27 | End: 2024-09-27

## 2024-09-27 RX ORDER — OXYCODONE HYDROCHLORIDE 5 MG/1
10 TABLET ORAL EVERY 4 HOURS PRN
Status: DISCONTINUED | OUTPATIENT
Start: 2024-09-27 | End: 2024-09-27 | Stop reason: HOSPADM

## 2024-09-27 RX ADMIN — ROCURONIUM BROMIDE 20 MG: 10 INJECTION INTRAVENOUS at 08:59

## 2024-09-27 RX ADMIN — ROCURONIUM BROMIDE 20 MG: 10 INJECTION INTRAVENOUS at 08:24

## 2024-09-27 RX ADMIN — FENTANYL CITRATE 75 MCG: 50 INJECTION, SOLUTION INTRAMUSCULAR; INTRAVENOUS at 07:28

## 2024-09-27 RX ADMIN — ROCURONIUM BROMIDE 50 MG: 10 INJECTION INTRAVENOUS at 07:34

## 2024-09-27 RX ADMIN — PROPOFOL 200 MG: 10 INJECTION, EMULSION INTRAVENOUS at 07:29

## 2024-09-27 RX ADMIN — SODIUM CHLORIDE, SODIUM LACTATE, POTASSIUM CHLORIDE, AND CALCIUM CHLORIDE: 600; 310; 30; 20 INJECTION, SOLUTION INTRAVENOUS at 07:28

## 2024-09-27 RX ADMIN — LIDOCAINE HYDROCHLORIDE 80 MG: 20 INJECTION, SOLUTION INFILTRATION; PERINEURAL at 07:28

## 2024-09-27 RX ADMIN — Medication 80 MCG: at 07:44

## 2024-09-27 RX ADMIN — Medication 160 MCG: at 07:33

## 2024-09-27 RX ADMIN — TRANEXAMIC ACID 1000 MG: 100 INJECTION INTRAVENOUS at 07:29

## 2024-09-27 RX ADMIN — Medication 80 MCG: at 07:49

## 2024-09-27 RX ADMIN — CEFAZOLIN 2 G: 1 INJECTION, POWDER, FOR SOLUTION INTRAMUSCULAR; INTRAVENOUS at 07:29

## 2024-09-27 RX ADMIN — Medication 80 MCG: at 07:55

## 2024-09-27 RX ADMIN — SUGAMMADEX 400 MG: 100 INJECTION, SOLUTION INTRAVENOUS at 09:38

## 2024-09-27 RX ADMIN — DEXAMETHASONE SODIUM PHOSPHATE 10 MG: 4 INJECTION INTRA-ARTICULAR; INTRALESIONAL; INTRAMUSCULAR; INTRAVENOUS; SOFT TISSUE at 07:31

## 2024-09-27 RX ADMIN — DEXAMETHASONE SODIUM PHOSPHATE 4 MG: 4 INJECTION INTRA-ARTICULAR; INTRALESIONAL; INTRAMUSCULAR; INTRAVENOUS; SOFT TISSUE at 06:30

## 2024-09-27 RX ADMIN — ROPIVACAINE HYDROCHLORIDE 30 ML: 5 INJECTION, SOLUTION EPIDURAL; INFILTRATION; PERINEURAL at 06:30

## 2024-09-27 RX ADMIN — FENTANYL CITRATE 25 MCG: 50 INJECTION, SOLUTION INTRAMUSCULAR; INTRAVENOUS at 08:46

## 2024-09-27 RX ADMIN — ONDANSETRON 4 MG: 2 INJECTION INTRAMUSCULAR; INTRAVENOUS at 09:25

## 2024-09-27 ASSESSMENT — PAIN SCALES - GENERAL
PAINLEVEL_OUTOF10: 1
PAINLEVEL_OUTOF10: 10 - WORST POSSIBLE PAIN
PAINLEVEL_OUTOF10: 1
PAINLEVEL_OUTOF10: 2
PAINLEVEL_OUTOF10: 10 - WORST POSSIBLE PAIN
PAINLEVEL_OUTOF10: 5 - MODERATE PAIN
PAINLEVEL_OUTOF10: 2
PAINLEVEL_OUTOF10: 3
PAINLEVEL_OUTOF10: 2
PAINLEVEL_OUTOF10: 10 - WORST POSSIBLE PAIN
PAINLEVEL_OUTOF10: 0 - NO PAIN
PAINLEVEL_OUTOF10: 10 - WORST POSSIBLE PAIN
PAINLEVEL_OUTOF10: 0 - NO PAIN
PAINLEVEL_OUTOF10: 10 - WORST POSSIBLE PAIN
PAINLEVEL_OUTOF10: 1
PAINLEVEL_OUTOF10: 3
PAINLEVEL_OUTOF10: 2
PAINLEVEL_OUTOF10: 10 - WORST POSSIBLE PAIN
PAINLEVEL_OUTOF10: 2

## 2024-09-27 ASSESSMENT — COGNITIVE AND FUNCTIONAL STATUS - GENERAL
WALKING IN HOSPITAL ROOM: A LITTLE
STANDING UP FROM CHAIR USING ARMS: A LITTLE
MOVING FROM LYING ON BACK TO SITTING ON SIDE OF FLAT BED WITH BEDRAILS: A LITTLE
MOVING TO AND FROM BED TO CHAIR: A LITTLE
MOBILITY SCORE: 18
CLIMB 3 TO 5 STEPS WITH RAILING: A LITTLE
TURNING FROM BACK TO SIDE WHILE IN FLAT BAD: A LITTLE

## 2024-09-27 ASSESSMENT — PAIN - FUNCTIONAL ASSESSMENT
PAIN_FUNCTIONAL_ASSESSMENT: 0-10

## 2024-09-27 ASSESSMENT — COLUMBIA-SUICIDE SEVERITY RATING SCALE - C-SSRS
1. IN THE PAST MONTH, HAVE YOU WISHED YOU WERE DEAD OR WISHED YOU COULD GO TO SLEEP AND NOT WAKE UP?: NO
2. HAVE YOU ACTUALLY HAD ANY THOUGHTS OF KILLING YOURSELF?: NO
6. HAVE YOU EVER DONE ANYTHING, STARTED TO DO ANYTHING, OR PREPARED TO DO ANYTHING TO END YOUR LIFE?: NO

## 2024-09-27 ASSESSMENT — PAIN SCALES - WONG BAKER: WONGBAKER_NUMERICALRESPONSE: HURTS WORST

## 2024-09-27 ASSESSMENT — PAIN DESCRIPTION - ORIENTATION: ORIENTATION: RIGHT

## 2024-09-27 ASSESSMENT — PAIN DESCRIPTION - LOCATION: LOCATION: LEG

## 2024-09-27 NOTE — BRIEF OP NOTE
Date: 2024  OR Location: [facility] OR    Name: Amara Cote, : 1965, Age: 59 y.o., MRN: [id number], Sex: female    Diagnosis  Pre-op Diagnosis      * Tibial plateau fracture, right, closed, initial encounter [S82.141A] Post-op Diagnosis     * Tibial plateau fracture, right, closed, initial encounter [S82.141A]     Procedures  Open Reduction Internal Fixation Tibia  75[address] - [place] OPEN TREATMENT FRACTURE DISTAL TIBIA ONLY      Surgeons      * Shaw Cole - Primary    Resident/Fellow/Other Assistant:  Surgeons and Role:     * Solo Sommers DO - Resident - Assisting     * Jory Astudillo PA-C - TOMMY First Assist    Procedure Summary  Anesthesia: General  ASA: II  Anesthesia Staff: Anesthesiologist: Bon Clifford MD  CRNA: MARCO Ball-CRNA  Estimated Blood Loss: 10 mL  Intra-op Medications:   Administrations occurring from 0700 to 1010 on 24:   Medication Name Total Dose   sodium chloride 0.9 % irrigation solution 1,000 mL   vancomycin (Vancocin) vial for injection 1 g   tobramycin (Nebcin) injection 1,200 mg   HYDROmorphone (Dilaudid) injection 0.5 mg 0.5 mg              Anesthesia Record               Intraprocedure I/O Totals          Intake    LR bolus 500.00 mL    Tranexamic Acid 0.00 mL    The total shown is the total volume documented since Anesthesia Start was filed.    Total Intake 500 mL          Specimen: No specimens collected     Staff:   Circulator: Vanessa Salomonub Person: Francesca Salomonub Person: Chace          Findings: lateral meniscus tear     Complications:  None; patient tolerated the procedure well.     Disposition: PACU - hemodynamically stable.  Condition: stable  Specimens Collected: No specimens collected  Attending Attestation:     Shaw Cole  Phone Number: [phone]

## 2024-09-27 NOTE — PERIOPERATIVE NURSING NOTE
1200: Patient received from phase one aox4, vitals stable, physical therapy paged multiple times.    1218: Physical therapy director Darwin Casey secure chat messaged d/t no response from page or calls. Per Darwin he would assign somebody to this patient.     1330: Message from Darwin Casey saying Physical Therapy will be here in 15 minutes.     1400: Physical therapy at bedside to work with patient before discharge.     1405: Dr Lv Sommers messaged d/t patient question regarding antibiotics. Per his message no antibiotics needed for discharge.     1500: Discharge instructions gone over with patient and . Reached out to director of Physical Therapy to help find a walker for patient.

## 2024-09-27 NOTE — PROGRESS NOTES
Physical Therapy    Physical Therapy Evaluation & Treatment    Patient Name: Amara Cote  MRN: [id number]  Department: [facility] GRUPO PREPOST  Room: [facility] MAGO  Today's Date: 9/27/2024   Time Calculation  Start Time: 1355  Stop Time: 1445  Time Calculation (min): 50 min    Assessment/Plan   PT Assessment  PT Assessment Results: Decreased strength, Decreased range of motion, Decreased endurance, Impaired balance, Decreased mobility, Decreased safety awareness, Orthopedic restrictions  Rehab Prognosis: Excellent  Barriers to Discharge: None  Evaluation/Treatment Tolerance: Patient tolerated treatment well  Medical Staff Made Aware: Yes  Strengths: Physical health, Premorbid level of function  End of Session Communication: Bedside nurse, Physician (Clarifying WBing orders)  Assessment Comment: Previously independent 59 y.o. female presents s/p ORIF of R tibia. Pt able to complete multiple trials of ambulation using crutches and FWW. Pt also able to negotiate stairs this session. Pt is appropriate for Low Intensity Rehab after DC to facilitate return to PLOF.  End of Session Patient Position: Bed, 2 rail up (ED bed)   IP OR SWING BED PT PLAN  Inpatient or Swing Bed: Inpatient  PT Plan  Treatment/Interventions: Bed mobility, Transfer training, Gait training, Stair training, Balance training, Strengthening, Endurance training, Therapeutic exercise, Therapeutic activity, Range of motion  PT Plan: Ongoing PT  PT Frequency: 5 times per week  PT Discharge Recommendations: Low intensity level of continued care  Equipment Recommended upon Discharge: Wheeled walker  PT Recommended Transfer Status: Assistive device, Contact guard  PT - OK to Discharge: Yes      Subjective     General Visit Information:  General  Reason for Referral: Fall resulting in Open Reduction Internal Fixation R Tibia  Past Medical History Relevant to Rehab: No PMH on file  Family/Caregiver Present: Yes  Caregiver Feedback:  present and  supportive throughout session  Prior to Session Communication: Bedside nurse, Physician (Resident contacted to clarify WBing orders)  Patient Position Received: Bed, 2 rail up (ED bed)  Preferred Learning Style: auditory, visual, verbal  General Comment: Pt reporting anxiety with mobility but pleasant and agreeable to PT session  Home Living:  Home Living  Type of Home: House  Lives With: Spouse ()  Home Adaptive Equipment: Crutches  Home Layout: Two level  Home Access: Stairs to enter with rails  Entrance Stairs-Rails: Left  Entrance Stairs-Number of Steps: 4  Bathroom Shower/Tub: Walk-in shower  Bathroom Toilet: Standard  Bathroom Equipment: Built-in shower seat  Prior Level of Function:  Prior Function Per Pt/Caregiver Report  Level of Forrest: Independent with ADLs and functional transfers, Independent with homemaking with ambulation  Ambulatory Assistance: Independent  Prior Function Comments: Pt reports walking 5-7 miles multiple times a week  Precautions:  Precautions  LE Weight Bearing Status: Right Non-Weight Bearing  Braces Applied: Hinged knee brace  Precautions Comment: Pt able to maintain NWBing precaution throughout session    Vital Signs (Past 2hrs)        Date/Time Vitals Session Patient Position Pulse Resp SpO2 BP MAP (mmHg)    09/27/24 1400 --  --  80  15  99 %  112/62  --                        Objective   Pain:  Pain Assessment  Pain Assessment: 0-10  0-10 (Numeric) Pain Score: 1  Pain Type: Surgical pain, Acute pain  Pain Location: Knee  Pain Orientation: Right  Pain Interventions: Ambulation/increased activity  Cognition:  Cognition  Overall Cognitive Status: Within Functional Limits  Orientation Level: Oriented X4    General Assessments:    Activity Tolerance  Endurance: Endurance does not limit participation in activity  Early Mobility/Exercise Safety Screen: Proceed with mobilization - No exclusion criteria met    Sensation  Light Touch: Partial deficits in the RLE (Likely 2/2  nerve block)    Coordination  Movements are Fluid and Coordinated: Yes    Postural Control  Postural Control: Within Functional Limits    Static Sitting Balance  Static Sitting-Balance Support: Feet supported, No upper extremity supported  Static Sitting-Level of Assistance: Close supervision    Static Standing Balance  Static Standing-Balance Support: Bilateral upper extremity supported  Static Standing-Level of Assistance: Contact guard  Functional Assessments:     Bed Mobility  Bed Mobility: Yes  Bed Mobility 1  Bed Mobility 1: Supine to sitting  Level of Assistance 1: Close supervision, Minimal verbal cues  Bed Mobility 2  Bed Mobility  2: Sitting to supine  Level of Assistance 2: Close supervision    Transfers  Transfer: Yes  Transfer 1  Transfer From 1: Bed to  Transfer to 1: Stand  Technique 1: Sit to stand  Transfer Device 1: Crutches  Transfer Level of Assistance 1: Contact guard, Moderate verbal cues  Transfers 2  Transfer From 2: Stand to  Transfer to 2: Wheelchair  Technique 2: Stand to sit  Transfer Device 2: Crutches, Walker  Transfer Level of Assistance 2: Contact guard, Moderate verbal cues  Trials/Comments 2: crtuches for 1 trial, FWW for 2nd trial  Transfers 3  Transfer From 3: Wheelchair to  Transfer to 3: Stand  Technique 3: Sit to stand  Transfer Device 3: Crutches, Walker  Transfer Level of Assistance 3: Contact guard, Moderate verbal cues  Trials/Comments 3: crtuches for 1 trial, FWW for 2nd trial  Transfers 4  Transfer From 4: Stand to  Transfer to 4: Toilet  Technique 4: Stand to sit  Transfer Device 4: Crutches  Transfer Level of Assistance 4: Contact guard, Minimal verbal cues  Transfers 5  Transfer From 5: Toilet to  Transfer to 5: Stand  Technique 5: Sit to stand  Transfer Device 5: Crutches  Transfer Level of Assistance 5: Contact guard, Moderate verbal cues  Transfers 6  Transfer From 6: Stand to  Transfer to 6: Bed  Technique 6: Stand to sit  Transfer Device 6: Walker  Transfer Level  of Assistance 6: Contact guard, Moderate verbal cues    Ambulation/Gait Training  Ambulation/Gait Training Performed: Yes  Ambulation/Gait Training 1  Surface 1: Level tile  Device 1: Axillary crutches  Gait Support Devices:  (Hinged knee brace)  Assistance 1: Contact guard, Minimal verbal cues  Quality of Gait 1:  (Hopping on LLE)  Comments/Distance (ft) 1: 12ft X3  Ambulation/Gait Training 2  Surface 2: Level tile  Device 2: Rolling walker  Gait Support Devices:  (Hinged knee brace)  Assistance 2: Contact guard, Moderate verbal cues  Quality of Gait 2:  (Hopping on LLE)  Comments/Distance (ft) 2: 12ft X2    Stairs  Stairs: Yes  Stairs  Rails 1: Left  Device 1: Railing, Single crutch  Support Devices 1:  (Hinged knee brace)  Assistance 1: Contact guard, Moderate verbal cues  Comment/Number of Steps 1: 3 steps     Extremity/Trunk Assessments:       RLE   RLE : Exceptions to WFL  Strength RLE  RLE Overall Strength: Greater than or equal to 3/5 as evidenced by functional mobility, Due to  precautions  LLE   LLE : Within Functional Limits  Treatments:  Treatment for performing multiple transfers, education and demonstration of ambulation with crutches vs FWW, and education and demonstration of stair negotiation.      Outcome Measures:  [facility] Basic Mobility  Turning from your back to your side while in a flat bed without using bedrails: A little  Moving from lying on your back to sitting on the side of a flat bed without using bedrails: A little  Moving to and from bed to chair (including a wheelchair): A little  Standing up from a chair using your arms (e.g. wheelchair or bedside chair): A little  To walk in hospital room: A little  Climbing 3-5 steps with railing: A little  Basic Mobility - Total Score: 18    Encounter Problems       Encounter Problems (Active)       Balance       STG - Maintains dynamic standing balance with upper extremity support (Progressing)       Start:  09/27/24    Expected End:  10/11/24        INTERVENTIONS:  1. Practice standing with minimal support.  2. Educate patient about standing tolerance.  3. Educate patient about independence with gait, transfers, and ADL's.  4. Educate patient about use of assistive device.  5. Educate patient about self-directed care.            Mobility       LTG - Patient will ambulate community distance indep with LRD (Progressing)       Start:  09/27/24    Expected End:  10/11/24            LTG - Patient will navigate 4-6 steps indep with rails/device (Progressing)       Start:  09/27/24    Expected End:  10/11/24               PT Transfers       STG - Transfer from bed to chair indep with LRD (Progressing)       Start:  09/27/24    Expected End:  10/11/24            STG - Patient will transfer sit to and from stand indep with LRD (Progressing)       Start:  09/27/24    Expected End:  10/11/24               Safety       Patient will maintain Non-Weightbearing status during all functional mobility (Progressing)       Start:  09/27/24    Expected End:  10/11/24                   Education Documentation  Precautions, taught by Christina Guardado PT at 9/27/2024  3:28 PM.  Learner: Patient  Readiness: Acceptance  Method: Explanation, Demonstration  Response: Verbalizes Understanding, Demonstrated Understanding  Comment: Pt given in depth descriptions and demonstration for stair negotiation and ambulation with crutches and FWW.    Body Mechanics, taught by Christina Guardado PT at 9/27/2024  3:28 PM.  Learner: Patient  Readiness: Acceptance  Method: Explanation, Demonstration  Response: Verbalizes Understanding, Demonstrated Understanding  Comment: Pt given in depth descriptions and demonstration for stair negotiation and ambulation with crutches and FWW.    Mobility Training, taught by Christina Guardado PT at 9/27/2024  3:28 PM.  Learner: Patient  Readiness: Acceptance  Method: Explanation, Demonstration  Response: Verbalizes Understanding, Demonstrated Understanding  Comment: Pt  given in depth descriptions and demonstration for stair negotiation and ambulation with crutches and FWW.    Education Comments  No comments found.

## 2024-09-27 NOTE — DISCHARGE SUMMARY
Discharge Diagnosis  Tibial plateau fracture, right, closed, initial encounter    Issues Requiring Follow-Up  R tibial plateau ORIF    Test Results Pending At Discharge  Pending Labs       No current pending labs.            Hospital Course   59 year-old F who presented with R tibial plateau fracture. Patient is now s/p ORIF on 9/27 by Dr. Cole. On the day of surgery, patient was identified in the pre-operative holding area and agreeable to proceed with surgery. Written consent was obtained.  Please see operative note for further details of this procedure. Patient received 24 hours of benoit-operative antibiotics. Patient recovered in the PACU before transfer to a regular nursing floor. Patient was started on oxycodone and tylenol for pain control and was discharged with prescription of ASA 81 mg bid for DVT prophylaxis for 6 weeks. Patient will follow-up with Dr. Cole in 2 weeks for post-operative visit.      Pertinent Physical Exam At Time of Discharge    Physical Exam prior to OR  - Constitutional: No acute distress, cooperative  - Eyes: EOM grossly intact  - Head/Neck: Trachea midline  - Respiratory/Thorax: Normal work of breathing  - Cardiovascular: RRR on peripheral palpation  - Gastrointestinal: Nondistended  - Psychological: Appropriate mood/behavior  - Skin: Warm and dry. Additional findings in musculoskeletal evaluation  - Musculoskeletal:    RLE:   Her musculoskeletal exam reveals her right knee is in slight valgus. She has about 2-3+ swelling laterally. Very tender over the lateral joint and very difficult to move the knee. I could not really do a ligament exam on her today because of pain but grossly her MCL appears to be intact. She has a negative Homans' sign 2+ dorsalis pedis and posterior tibial pulse.       Home Medications     Medication List      START taking these medications     acetaminophen 325 mg tablet; Commonly known as: Tylenol; Take 2 tablets   (650 mg) by mouth every 6 hours if  needed for mild pain (1 - 3) for up to   15 days.   aspirin 81 mg EC tablet; Take 1 tablet (81 mg) by mouth 2 times a day.   docusate sodium 100 mg capsule; Commonly known as: Colace; Take 1   capsule (100 mg) by mouth 2 times a day as needed for constipation for up   to 15 days.   oxyCODONE 5 mg immediate release tablet; Commonly known as: Roxicodone;   Take 1 tablet (5 mg) by mouth every 4 hours if needed for severe pain (7 -   10) or moderate pain (4 - 6) for up to 7 days.     CONTINUE taking these medications     amLODIPine 5 mg tablet; Commonly known as: Norvasc; Take 1 tablet (5 mg)   by mouth once daily.   ascorbic acid (vitamin C) 500 mg capsule   hydrocortisone 2.5 % cream   latanoprost 0.005 % ophthalmic solution; Commonly known as: Xalatan;   Administer 1 drop into both eyes once daily at bedtime.   metroNIDAZOLE 0.75 % cream; Commonly known as: Metrocream   spironolactone 50 mg tablet; Commonly known as: Aldactone   tretinoin 0.05 % cream; Commonly known as: Retin-A     STOP taking these medications     traMADol 50 mg tablet; Commonly known as: Ultram       Outpatient Follow-Up  Future Appointments   Date Time Provider Department Center   10/14/2024 11:20 AM Jory Astudillo PA-C [id number] [facility]   11/12/2024  1:00 PM Irvin Torres MD [id number] None   1/21/2025  1:30 PM Shannan Camacho MD [id number] [facility]   3/19/2025  1:45 PM Nilda Rosario MD [id number] [facility]       Gualberto Nava MD

## 2024-09-27 NOTE — DISCHARGE INSTRUCTIONS
Weight bearing status: Nonweightbearing right lower extremity in hinged knee brace. Ok to remove the brace at night. Ok for gentle passive ROM of the knee.     VTE Prophylaxis (Blood Clot Prevention): Aspirin 81 twice daily    Home Medication: Resume all home medications    Resume normal diet     Leave operative dressing in place for one week. Then remove and leave incision open to air. Let water run freely over incision when showering, do not scrub. Do not remove steri strips. They will simply fall off. Do not soak in pool or tub. Do not swim in pools or ponds until 3 months after surgery.    Call if any drainage after 7 days, increased redness/warmth/swelling at incision site, pain/tenderness of calf, swelling of calf that does not respond to elevation, SOB/chest pain.    Call for any questions or concerns.     MEDICATION SIDE EFFECTS.  OXYCODONE: constipation, nausea, vomiting, upset stomach, (sleepiness), dizziness, lightheadedness, itching, headache, blurred vision, dry mouth, sweating  TRAMADOL: headache, dizziness, drowsiness, tired feeling; constipation, diarrhea, nausea, vomiting, stomach pain; feeling nervous or anxious, itching, sweating, flushing.  ASPIRIN:  upset stomach, heartburn; drowsiness; or headache    Follow up with Dr. Cole in 2 weeks. Call [phone] to schedule/confirm appointment.

## 2024-09-27 NOTE — CONSULTS
Amara Cote is a 59 y.o. year old female patient who presents for ORIF of Right Tibia with  on 9/27/2024. Acute Pain consulted for block for postoperative pain control.       Anticipated Postop Pain Issues -   Palliative: typically relieved with IV analgesics and regional local anesthetics  Provocative: typically with movement  Quality: typically burning and aching  Radiation: typically none  Severity: typically severe 8-10/10  Timing: typically constant    Past Medical History:   Diagnosis Date    Dry eye syndrome of bilateral lacrimal glands 11/18/2022    Bilateral dry eyes    Encounter for screening for malignant neoplasm of colon 10/17/2018    Colon cancer screening    Other chest pain 12/21/2015    Atypical chest pain    Other specified disorders of eye and adnexa 04/03/2019    Eye irritation    Other specified health status 08/29/2017    Birth control    Personal history of other endocrine, nutritional and metabolic disease 10/10/2018    History of hypothyroidism    Personal history of other endocrine, nutritional and metabolic disease 10/22/2018    History of high cholesterol    Personal history of other endocrine, nutritional and metabolic disease 09/30/2019    History of hypothyroidism    Personal history of other infectious and parasitic diseases     History of herpes zoster    Zoster with other complications 11/18/2022    Herpes zoster dermatitis        Past Surgical History:   Procedure Laterality Date    MOUTH SURGERY  09/09/2014    Oral Surgery Tooth Extraction    TONSILLECTOMY  09/09/2014    Tonsillectomy With Adenoidectomy        Family History   Problem Relation Name Age of Onset    Sarcoidosis Mother      Coronary artery disease Father      Hypertension Father      Stroke Father      Blindness Other AUNT     Cancer Other GRANDFATHER     Cancer Cousin          Social History     Socioeconomic History    Marital status:      Spouse name: Not on file    Number of children: Not on  file    Years of education: Not on file    Highest education level: Not on file   Occupational History    Not on file   Tobacco Use    Smoking status: Never    Smokeless tobacco: Never   Vaping Use    Vaping status: Never Used   Substance and Sexual Activity    Alcohol use: Yes     Alcohol/week: 7.0 standard drinks of alcohol     Types: 7 Standard drinks or equivalent per week    Drug use: Never    Sexual activity: Yes   Other Topics Concern    Not on file   Social History Narrative    Not on file     Social Determinants of Health     Financial Resource Strain: Not on file   Food Insecurity: Not on file   Transportation Needs: Not on file   Physical Activity: Not on file   Stress: Not on file   Social Connections: Not on file   Intimate Partner Violence: Not on file   Housing Stability: Not on file        Allergies   Allergen Reactions    Ibuprofen Rash         Review of Systems  Gen: No fatigue, anorexia, insomnia, fever.   Eyes: No vision loss, double vision, drainage, eye pain.   ENT: No pharyngitis, dry mouth, no hearing changes or ear discharge  Cardiac: No chest pain, palpitations, syncope, near syncope.   Pulmonary: No shortness of breath, cough, hemoptysis.   Heme/lymph: No swollen glands, fever, bleeding.   GI: No abdominal pain, change in bowel habits, melena, hematemesis, hematochezia, nausea, vomiting, diarrhea.   : No discharge, dysuria, frequency, urgency, hematuria.  Endo: No polyuria or weight loss.   Musculoskeletal: Negative for any pain or loss of ROM/weakness  Skin: No rashes or lesions  Neuro: Normal speech, no numbness or weakness. No gait difficulties  Review of systems is otherwise negative unless stated above or in history of present illness.    Physical Exam:  Constitutional:  no distress, alert and cooperative  Eyes: clear sclera  Head/Neck: No apparent injury, trachea midline  Respiratory/Thorax: Patent airways, thorax symmetric, breathing comfortably  Cardiovascular: no pitting  edema  Gastrointestinal: Nondistended  Musculoskeletal: ROM intact  Extremities: no clubbing  Neurological: alert, piper x4  Psychological: Appropriate affect    No results found for this or any previous visit (from the past 24 hour(s)).     Amara Cote is a 59 y.o. year old female patient who presents for ORIF of Right Tibia with  on 9/27/2024. Acute Pain consulted for block for postoperative pain control.     Plan:    -  Right Popliteal/Saphenous Block.  blocks performed preoperatively on 9/27/2024  - Pain medications per primary team  - Will see on POD1 if inpatient    Acute Pain Team  pg [phone] ph [phone].

## 2024-09-27 NOTE — OP NOTE
Open Reduction Internal Fixation Tibia (R) Operative Note     Date: 2024  OR Location: [facility] OR    Name: Amara Cote, : 1965, Age: 59 y.o., MRN: [id number], Sex: female    Diagnosis  Pre-op Diagnosis      * Tibial plateau fracture, right, closed, initial encounter [S82.141A] Post-op Diagnosis     * Tibial plateau fracture, right, closed, initial encounter [S82.141A]     Procedures  Open Reduction Internal Fixation Tibia  62810 - [place] OPEN TREATMENT FRACTURE DISTAL TIBIA ONLY      Surgeons      * Shaw Cole - Primary    Resident/Fellow/Other Assistant:  Surgeons and Role:     * Solo Sommers,  - Resident - Assisting     * Jory Astudillo PA-C - TOMMY First Assist    Procedure Summary  Anesthesia: Anesthesia type not filed in the log.  ASA: II  Anesthesia Staff: Anesthesiologist: Bon Clifford MD  CRNA: MARCO Ball-CRNA  Estimated Blood Loss:  75mL  Intra-op Medications:   Administrations occurring from 0700 to 1010 on 24:   Medication Name Total Dose   sodium chloride 0.9 % irrigation solution 1,000 mL   vancomycin (Vancocin) vial for injection 1 g   tobramycin (Nebcin) injection 1,200 mg              Anesthesia Record               Intraprocedure I/O Totals          Intake    Tranexamic Acid 0.00 mL    The total shown is the total volume documented since Anesthesia Start was filed.    Total Intake 0 mL          Specimen: No specimens collected     Staff:   Circulator: Vanessa Lema Person: Francesca Lema Person: Chace         Drains and/or Catheters: * None in log *    Tourniquet Times:     Total Tourniquet Time Documented:  Thigh (Right) - 65 minutes  Total: Thigh (Right) - 65 minutes      Implants:  Implants       Type Name Action Serial No.      Graft BONE CHIPS,  CANCELL 30CC 1.7-10MM - [id number] - [id number] Implanted [id number]     Screw PLATE LCP T RT ANGL 4H/6H 3.5X78 - [id number] Implanted      Screw SCREW CORTEX 3.5 X 65 - [id number]  Implanted      Screw SCREW CORTEX 3.5 X 60 - [id number] Implanted      Screw SCREW, LOCKING 3.5MM W/RECESS 65MM - [id number] Implanted      Screw SCREW LOCKING 3.5 W/RECESS 50 - [id number] Implanted      Screw SCREW LOCKING 3.5 W/RECESS 55 - [id number] Implanted      Screw SCREW, CORTICAL, SELF-TAPPING, 3.5 X 24 MM, STAINLESS STEEL - [id number] Implanted      Screw SCREW LOCKING 3.5 W/RECESS 30 - [id number] Implanted               Findings: torn lateral meniscus    Indications: Amara Cote is an 59 y.o. female who is having surgery for Tibial plateau fracture, right, closed, initial encounter [S82.141A].     The patient was seen in the preoperative area. The risks, benefits, complications, treatment options, non-operative alternatives, expected recovery and outcomes were discussed with the patient. The possibilities of reaction to medication, pulmonary aspiration, injury to surrounding structures, bleeding, recurrent infection, the need for additional procedures, failure to diagnose a condition, and creating a complication requiring transfusion or operation were discussed with the patient. The patient concurred with the proposed plan, giving informed consent.  The site of surgery was properly noted/marked if necessary per policy. The patient has been actively warmed in preoperative area. Preoperative antibiotics have been ordered and given within 1 hours of incision. Venous thrombosis prophylaxis have been ordered including chemical prophylaxis    Procedure Details operative procedure    Preoperative diagnosis lateral tibial plateau fracture right knee   postop diagnosis same with peripheral tear lateral meniscus right knee    Procedure open reduction internal fixation of right lateral tibial plateau depressed fracture with allograft bone grafting and repair of peripheral tear lateral meniscus    Surgeon soni first assistant was Jory Enamorado was required for the surgery as initially there was no  qualified resident to help because of academic obligations.  Jory Madrid PA-C participated in this case as the first assistant, performing components of the positioning, retraction, exposure, reduction, stabilization, and closure. Due to the nature and complexity of the case, no qualified resident of an appropriate level was available to assist.  Second assistant was Solo Sommers DO     Anesthesia was general estimated blood loss 75 cc    Preoperative indications this is a 59-year-old female who is quite healthy but very thin.  She walks between 4 and 5 miles at least 3 days a week.  Approximately 10 days ago she slipped in a restaurant and twisted her right knee sustaining an injury.  She was seen at a local urgent care center affiliated with .  I was called through primary care physician and she followed up in my office 2 days later.  She was found to have a depressed lateral tibial plateau fracture, significant osteo porosis, and very early arthritic changes.  I talked her about the risks and benefits of open reduction internal fixation.  I also talked her about the possibility of having a torn meniscus with this particular fracture pattern and having to repair that at the same time the risks and benefits were discussed and she wished to proceed.    Operative procedure the patient was wheeled into the operating room and a full huddle was performed.  The patient had adequate general anesthesia at this time and 2 g of Ancef and appropriate TXA were given.  A tourniquet was placed on proximal right thigh the entire right lower extremity was then prepped and draped in usual sterile fashion.  Surgical pause was performed.  The knee was examined under anesthesia and the patient did have about a 3+ laxity medial lateral secondary to the lateral depressed fracture.  We exsanguinated the right lower extremity and elevated the tourniquet.  A incision was made proximally and laterally first in line with the  proximal lateral portion of the tibia turned over Daily's tubercle and then vertically over the lateral knee joint the incision was taken down through skin and subcutaneous tissue.  The anterior muscle compartment was peeled gently off the tibia including part of the Daily's tubercle to expose the joint.  There was already at a small rent in the capsule.  The which was which had old blood which we evacuated.  We then elevated what was left of the coronary ligaments to the lateral malleolus and L try to elevate the lateral malleolus to expose the joint.  What we found is that the peripheral portion of the posterior horn of the lateral meniscus was torn from the synovium and part of the lateral malleolus which wedged into the fracture site.  We identified the fracture site by direct visualization.  We elevated it with a joker's underneath the subchondral bone which created about a 5 cm hole.  We were satisfied with the elevation of the plateau we packed the undersurface subchondral bone with allograft chips which fit quite nicely.  We then because of the periarticular location of the fracture and the fact that it did not extend all the way into the condyle we thought that the larger plate for the small patient would still be very prominent.  We took a 6-hole T plate from the small frag set and contoured it to the her lateral lateral plateau in the meantime we placed three 2-0 PDS sutures in the lateral meniscus crossing the peripheral tear until we have these.  We then placed a single lag screw through the plate from lateral to medial at the subchondral bone level using a 3 5 drill bit and a 2 5 drill bit and a fully threaded 3 5 screw compressing the already elevated lateral plateau.  We then put 4 locking screws across in a scaffolding fashion through the plate using 3 5 screws we put 2 locking screws in the metaphyseal diaphyseal junction as kickstand type screws to bring the plate down to bone and then we put a  locking and nonlocking in the distal end of the plate.  AP and lateral of the reduction appeared to be anatomic.  We then placed the 2-0 PDS sutures through the capsule from outside in we then repaired the capsule and the IT band with interrupted 0 Vicryl suture and we repaired the lateral meniscus to the outside of the IT band gently bring the issue the 3 sutures together which which brought the lateral meniscus laterally to the synovium.  We then released the tourniquet at this time no greater bleeding was encountered copiously irrigated out the area we did prior to closure also irrigated deep and we placed 1 g of vancomycin 1 g of tobramycin powder locally.  We then closed the skin with 2-0 Vicryl and 4-0 Monocryl suture and Steri-Strips.  Sterile dressings were applied with placed the patient in her hinged knee brace.  Will allow her some gentle range of motion early she would be nonweightbearing she would receive aspirin 81 mg twice daily for DVT prophylaxis.  Patient tolerated procedure well will follow-up in the office in 2 weeks     *Complications:  None; patient tolerated the procedure well.    Disposition: PACU - hemodynamically stable.  Condition: stable         Additional Details:     Attending Attestation: I was present and scrubbed for the entire procedure.    Shaw Cole  Phone Number: [phone]

## 2024-09-27 NOTE — ANESTHESIA POSTPROCEDURE EVALUATION
Patient: Amara Cote    Procedure Summary       Date: 09/27/24 Room / Location: [facility] OR 09 / [facility] OR    Anesthesia Start: 0721 Anesthesia Stop: 0950    Procedure: Open Reduction Internal Fixation Tibia (Right: Leg Lower) Diagnosis:       Tibial plateau fracture, right, closed, initial encounter      (Tibial plateau fracture, right, closed, initial encounter [S82.141A])    Surgeons: Shaw Cole MD Responsible Provider: Bon Clifford MD    Anesthesia Type: general ASA Status: 2            Anesthesia Type: general    Vitals Value Taken Time   /74 09/27/24 1002   Temp 36.4 09/27/24 1002   Pulse 96 09/27/24 1000   Resp 13 09/27/24 1000   SpO2 99 % 09/27/24 1000   Vitals shown include unfiled device data.    Anesthesia Post Evaluation    Patient location during evaluation: PACU  Patient participation: complete - patient participated  Level of consciousness: awake  Pain management: adequate  Airway patency: patent  Cardiovascular status: acceptable  Respiratory status: acceptable  Hydration status: acceptable  Postoperative Nausea and Vomiting: none      There were no known notable events for this encounter.

## 2024-09-27 NOTE — PERIOPERATIVE NURSING NOTE
1520 pt  requested a medical release form it was provided to them to complete Shruthi ming RN provided it patient was given a copy all questions were answered pt had no signs of distress safety maintained  1535 transport request placed wheelchair at bedside   1555 job request still unplanned  increasingly becoming frustrated I took the pt myself via wheelchair to a private vehicle  driving safety maintained

## 2024-09-27 NOTE — ANESTHESIA PROCEDURE NOTES
Peripheral Block    Patient location during procedure: pre-op  Start time: 9/27/2024 6:20 AM  End time: 9/27/2024 6:32 AM  Reason for block: at surgeon's request and post-op pain management  Staffing  Performed: resident   Authorized by: Bon Clifford MD    Performed by: Ajit Pedro MD  Preanesthetic Checklist  Completed: patient identified, IV checked, site marked, risks and benefits discussed, surgical consent, monitors and equipment checked, pre-op evaluation and timeout performed   Timeout performed at: 9/27/2024 6:23 AM  Peripheral Block  Patient position: laying flat  Prep: ChloraPrep  Patient monitoring: heart rate and continuous pulse ox  Block type: popliteal and adductor canal  Laterality: right  Injection technique: single-shot  Guidance: ultrasound guided  Local infiltration: ropivacaine  Needle  Needle type: Tuohy   Needle gauge: 22 G  Needle length: 8 cm  Needle localization: ultrasound guidance     image stored in chart  Assessment  Injection assessment: negative aspiration for heme, no paresthesia on injection, incremental injection and local visualized surrounding nerve on ultrasound  Additional Notes  Popliteal and saphenous nerve block: Informed consent obtained.  Risks, benefits, and alternatives discussed.  ASA monitors placed and timeout performed.  Patient positioned, prepped with chlorhexidine, and draped with sterile towels.      Ultrasound guidance was used to visualize the tibia and common peroneal nerves at the popliteal fossa and surrounding structures with visualization of the needle throughout duration of the procedure.  Aspiration was negative.  12mcg of precedex ,20 cc of 0.5% ropivacaine, dexamethasone 4 mg, and 1:200,000 epinephrine injected unilaterally    Ultrasound guidance was used to visualize the saphenous nerve at the adductor canal and surrounding structures with visualization of the needle throughout duration of the procedure.  Aspiration was negative.  4 cc of  precedex, 20 cc of 0.5% ropivacaine, dexamethasone 4 mg, and 1:200,000 epinephrine injected unilaterally    Patient tolerated the procedure well.    Timeout by Jennifer BLANCHARD

## 2024-09-27 NOTE — ANESTHESIA PREPROCEDURE EVALUATION
Patient: Amara Cote    Procedure Information       Date/Time: 09/27/24 0700    Procedure: Open Reduction Internal Fixation Tibia (Right: Leg Lower) - ORIF RIGHT TIB PLATEAU CPT 09282    Location: [facility] OR 09 / [facility]    Surgeons: Shaw Cole MD          ALLERGIES:  Allergies   Allergen Reactions    Ibuprofen Rash        MEDICAL HISTORY:  Past Medical History:   Diagnosis Date    Dry eye syndrome of bilateral lacrimal glands 11/18/2022    Bilateral dry eyes    Encounter for screening for malignant neoplasm of colon 10/17/2018    Colon cancer screening    Other chest pain 12/21/2015    Atypical chest pain    Other specified disorders of eye and adnexa 04/03/2019    Eye irritation    Other specified health status 08/29/2017    Birth control    Personal history of other endocrine, nutritional and metabolic disease 10/10/2018    History of hypothyroidism    Personal history of other endocrine, nutritional and metabolic disease 10/22/2018    History of high cholesterol    Personal history of other endocrine, nutritional and metabolic disease 09/30/2019    History of hypothyroidism    Personal history of other infectious and parasitic diseases     History of herpes zoster    Zoster with other complications 11/18/2022    Herpes zoster dermatitis        Relevant Problems   Cardiac   (+) Benign essential hypertension        SURGICAL HISTORY:  Past Surgical History:   Procedure Laterality Date    MOUTH SURGERY  09/09/2014    Oral Surgery Tooth Extraction    TONSILLECTOMY  09/09/2014    Tonsillectomy With Adenoidectomy        MEDICATIONS:  Current Outpatient Medications   Medication Instructions    amLODIPine (NORVASC) 5 mg, oral, Daily    ascorbic acid, vitamin C, 500 mg capsule oral    hydrocortisone 2.5 % cream APPLY TO FACE DAILY FOR DRYNESS    latanoprost (Xalatan) 0.005 % ophthalmic solution 1 drop, Both Eyes, Nightly    metroNIDAZOLE (Metrocream) 0.75 % cream APPLY TO AREAS OF ROSACEA ON  "FACE DAILY.    spironolactone (Aldactone) 50 mg tablet 1 tablet, oral, Daily    traMADol (ULTRAM) 50 mg, oral, Every 6 hours PRN    tretinoin (Retin-A) 0.05 % cream APPLY TO FACE EVERY DAY AT BEDTIME        VITALS:      9/27/2024     6:07 AM 9/17/2024     6:01 PM 6/11/2024     3:00 PM   Vitals   Systolic 138 157 136   Diastolic 98 90 84   Heart Rate 87 87 90   Temp 36.2 °C (97.2 °F) 36.7 °C (98 °F)    Resp 17 17 20   Height (in) 1.676 m (5' 5.98\")  1.676 m (5' 6\")   Weight (lb) 117.06 117 116   BMI 18.9 kg/m2 18.88 kg/m2 18.72 kg/m2   BSA (m2) 1.57 m2 1.57 m2 1.57 m2   Visit Report  Report    Report Report       LABS:   BMP   Lab Results   Component Value Date    GLUCOSE 99 09/24/2024    GLUCOSE 97 09/24/2024    CALCIUM 10.0 09/24/2024    CALCIUM 9.6 09/24/2024     (L) 09/24/2024     (L) 09/24/2024    K 4.2 09/24/2024    K 4.3 09/24/2024    CO2 27 09/24/2024    CO2 27 09/24/2024    CL 96 (L) 09/24/2024    CL 97 (L) 09/24/2024    BUN 11 09/24/2024    BUN 10 09/24/2024    CREATININE 0.82 09/24/2024    CREATININE 0.80 09/24/2024   , CBC  Lab Results   Component Value Date    WBC 7.8 09/24/2024    HGB 13.5 09/24/2024    HCT 39.6 09/24/2024    MCV 92 09/24/2024     09/24/2024          SOCIAL:  Social History     Tobacco Use   Smoking Status Never   Smokeless Tobacco Never      Social History     Substance and Sexual Activity   Alcohol Use Yes    Alcohol/week: 7.0 standard drinks of alcohol    Types: 7 Standard drinks or equivalent per week      Social History     Substance and Sexual Activity   Drug Use Never        NPO STATUS:  NPO/Void Status  Carbohydrate Drink Given Prior to Surgery? : N  Date of Last Liquid: 09/26/24  Time of Last Liquid: 0000  Date of Last Solid: 09/26/24  Time of Last Solid: 0000  Last Intake Type: Clear fluids  Time of Last Void: 0601        Clinical Areas Reviewed:   Tobacco   Meds   Med Hx  Surg Hx  OB Status  Fam Hx  Soc Hx        Anesthesia Assessment:    Physical " Exam    Airway  Mallampati: III  TM distance: >3 FB  Neck ROM: full     Cardiovascular   Rhythm: regular  Rate: normal     Dental    Pulmonary   Comments: Non labored resp    Abdominal            Anesthesia Plan    History of general anesthesia?: yes  History of complications of general anesthesia?: no    ASA 2     general     intravenous induction   Postoperative administration of opioids is intended.  Trial extubation is planned.  Anesthetic plan and risks discussed with patient.  Use of blood products discussed with patient who.    Plan discussed with CAA and CRNA.

## 2024-09-27 NOTE — ANESTHESIA PROCEDURE NOTES
Airway  Date/Time: 9/27/2024 7:40 AM  Urgency: elective    Airway not difficult    Staffing  Performed: CRNA   Authorized by: Bon Clifford MD    Performed by: Destinee Rdz RN  Patient location during procedure: OR    Indications and Patient Condition  Indications for airway management: anesthesia  Sedation level: deep  Preoxygenated: yes  Patient position: sniffing  MILS maintained throughout  Mask difficulty assessment: 1 - vent by mask    Final Airway Details  Final airway type: endotracheal airway      Successful airway: ETT  Cuffed: yes   Successful intubation technique: direct laryngoscopy  Facilitating devices/methods: cricoid pressure  Endotracheal tube insertion site: oral  Blade: Kirit  Blade size: #4  ETT size (mm): 7.0  Cormack-Lehane Classification: grade I - full view of glottis  Placement verified by: chest auscultation and capnometry   Cuff volume (mL): 10  Measured from: lips  ETT to lips (cm): 21  Number of attempts at approach: 1  Number of other approaches attempted: 0

## 2024-09-30 ENCOUNTER — TELEPHONE (OUTPATIENT)
Dept: INTERNAL MEDICINE | Facility: HOSPITAL | Age: 59
End: 2024-09-30
Payer: COMMERCIAL

## 2024-09-30 NOTE — TELEPHONE ENCOUNTER
Called patient at designated contact number.     POD 3 from ORIF tibia.    Received pop/saph SS nerve block for post-surgical pain control.    Block has fully resolved. Denies any new weakness or paresthesias (numbness/tingling) . Denies edema, erythema or induration at injection site(s).    Patient is satisfied with current pain management.    All questions answered. Patient instructed to call surgical office with any further questions or concerns.     Nanci Stiles, APRN-CNP

## 2024-10-01 ENCOUNTER — TELEPHONE (OUTPATIENT)
Dept: HOME HEALTH SERVICES | Facility: HOME HEALTH | Age: 59
End: 2024-10-01

## 2024-10-02 ENCOUNTER — HOME HEALTH ADMISSION (OUTPATIENT)
Dept: HOME HEALTH SERVICES | Facility: HOME HEALTH | Age: 59
End: 2024-10-02
Payer: COMMERCIAL

## 2024-10-02 ENCOUNTER — TELEPHONE (OUTPATIENT)
Facility: CLINIC | Age: 59
End: 2024-10-02
Payer: COMMERCIAL

## 2024-10-02 ENCOUNTER — DOCUMENTATION (OUTPATIENT)
Dept: HOME HEALTH SERVICES | Facility: HOME HEALTH | Age: 59
End: 2024-10-02
Payer: COMMERCIAL

## 2024-10-02 DIAGNOSIS — S82.141A TIBIAL PLATEAU FRACTURE, RIGHT, CLOSED, INITIAL ENCOUNTER: ICD-10-CM

## 2024-10-02 NOTE — TELEPHONE ENCOUNTER
Amara Caballero called. Had surgery with Dr. Cole. Needs order for Physical Therapy.  [phone]. Having a problem getting it from Dr. Cole's office.

## 2024-10-02 NOTE — HH CARE COORDINATION
Home Care received a Referral for Physical Therapy. We have processed the referral for a Start of Care on 10/4/24.     If you have any questions or concerns, please feel free to contact us at [phone]. Follow the prompts, enter your five digit zip code, and you will be directed to your care team on [facility].

## 2024-10-03 ENCOUNTER — HOME CARE VISIT (OUTPATIENT)
Dept: HOME HEALTH SERVICES | Facility: HOME HEALTH | Age: 59
End: 2024-10-03
Payer: COMMERCIAL

## 2024-10-03 VITALS
TEMPERATURE: 97.8 F | HEART RATE: 101 BPM | DIASTOLIC BLOOD PRESSURE: 92 MMHG | SYSTOLIC BLOOD PRESSURE: 143 MMHG | RESPIRATION RATE: 16 BRPM | OXYGEN SATURATION: 98 %

## 2024-10-03 PROCEDURE — G0151 HHCP-SERV OF PT,EA 15 MIN: HCPCS

## 2024-10-03 SDOH — HEALTH STABILITY: PHYSICAL HEALTH: EXERCISE COMMENTS: AVOIDING ACTIVE R KNEE ROM IN ORDER TO MAINTAIN ORTHO PRECAUTIONS.

## 2024-10-03 SDOH — HEALTH STABILITY: PHYSICAL HEALTH
EXERCISE COMMENTS: PT INSTRUCTED PATIENT IN HEP PER ORTHO PROTOCOL.  PATIENT IS PERRMITTED MODERATE ISOMETRIC EXERCISES OF QUADS AND HAMSTRINGS WITH KNEE BRACE ON 0-60 DEGREES:  - QUAD SETS X 10  - HAM SETS X 10  - GLUT SETS X 10    PT EDUCATED PATIENT ON IMPORTANCE OF

## 2024-10-03 ASSESSMENT — ENCOUNTER SYMPTOMS
LOWEST PAIN SEVERITY IN PAST 24 HOURS: 3/10
PERSON REPORTING PAIN: PATIENT
PAIN: 1
PAIN LOCATION - PAIN FREQUENCY: CONSTANT
HIGHEST PAIN SEVERITY IN PAST 24 HOURS: 7/10
PAIN LOCATION: RIGHT KNEE

## 2024-10-03 ASSESSMENT — ACTIVITIES OF DAILY LIVING (ADL)
ENTERING_EXITING_HOME: CONTACT GUARD ASSIST
OASIS_M1830: 03

## 2024-10-04 ENCOUNTER — HOME CARE VISIT (OUTPATIENT)
Dept: HOME HEALTH SERVICES | Facility: HOME HEALTH | Age: 59
End: 2024-10-04
Payer: COMMERCIAL

## 2024-10-04 VITALS
TEMPERATURE: 97.5 F | DIASTOLIC BLOOD PRESSURE: 82 MMHG | RESPIRATION RATE: 16 BRPM | SYSTOLIC BLOOD PRESSURE: 119 MMHG | OXYGEN SATURATION: 99 % | HEART RATE: 93 BPM

## 2024-10-04 PROCEDURE — G0151 HHCP-SERV OF PT,EA 15 MIN: HCPCS

## 2024-10-04 SDOH — HEALTH STABILITY: PHYSICAL HEALTH: EXERCISE COMMENTS: 3S HOLD, 1 X 10  - R, ISO, HS, 3S HOLD, 1 X 10

## 2024-10-04 SDOH — HEALTH STABILITY: PHYSICAL HEALTH
EXERCISE COMMENTS: PT WAS VERY CHALLENGED WITH R QUAD AND HS PROPRIOCEPTION AND REQUIRED VCS AND TCS TO ISOLATE MILD-MOD QUAD AND HS ACTIVATION.   PT PERFORMED THE FOLLOWING EXERCISES SEATED WITH RLE SUPPORTED BY COUCH AND OTTOMAN AND WEARING KNEE BRACE:  - R QUAD SET,

## 2024-10-04 ASSESSMENT — ENCOUNTER SYMPTOMS
MUSCLE WEAKNESS: 1
PAIN: 1
PERSON REPORTING PAIN: PATIENT
OCCASIONAL FEELINGS OF UNSTEADINESS: 1

## 2024-10-04 ASSESSMENT — ACTIVITIES OF DAILY LIVING (ADL)
AMBULATION ASSISTANCE: STAND BY ASSIST
AMBULATION ASSISTANCE ON FLAT SURFACES: 1
CURRENT_FUNCTION: STAND BY ASSIST
CURRENT_FUNCTION: SUPERVISION
PHYSICAL TRANSFERS ASSESSED: 1
AMBULATION ASSISTANCE ON FLAT SURFACES: 1

## 2024-10-07 ENCOUNTER — HOME CARE VISIT (OUTPATIENT)
Dept: HOME HEALTH SERVICES | Facility: HOME HEALTH | Age: 59
End: 2024-10-07
Payer: COMMERCIAL

## 2024-10-07 VITALS
RESPIRATION RATE: 18 BRPM | SYSTOLIC BLOOD PRESSURE: 100 MMHG | OXYGEN SATURATION: 98 % | HEART RATE: 125 BPM | TEMPERATURE: 98 F | DIASTOLIC BLOOD PRESSURE: 70 MMHG

## 2024-10-07 PROCEDURE — G0151 HHCP-SERV OF PT,EA 15 MIN: HCPCS

## 2024-10-07 SDOH — HEALTH STABILITY: PHYSICAL HEALTH
EXERCISE COMMENTS: PT PERFORMED THE FOLLOWING THEREX, 1X10, LONG SITTING:    - ISO, HS SET  - QS  - GLUTE SET     SUPINE, 1 X 10:   - R PROM, HEEL SLIDES, 3 X10  - R PROM, SLR  - R PROM HIP ABD  - R PROM  HIP CIRCUMDUCTION, CW AND CCW  - R ANKLE PF/ DF  - R ANKLE INV/E

## 2024-10-07 SDOH — HEALTH STABILITY: PHYSICAL HEALTH: EXERCISE COMMENTS: V

## 2024-10-07 ASSESSMENT — ACTIVITIES OF DAILY LIVING (ADL): AMBULATION ASSISTANCE ON FLAT SURFACES: 1

## 2024-10-07 ASSESSMENT — ENCOUNTER SYMPTOMS
HIGHEST PAIN SEVERITY IN PAST 24 HOURS: 3/10
PERSON REPORTING PAIN: PATIENT
PAIN: 1

## 2024-10-10 ENCOUNTER — HOME CARE VISIT (OUTPATIENT)
Dept: HOME HEALTH SERVICES | Facility: HOME HEALTH | Age: 59
End: 2024-10-10
Payer: COMMERCIAL

## 2024-10-10 VITALS
SYSTOLIC BLOOD PRESSURE: 119 MMHG | HEART RATE: 113 BPM | OXYGEN SATURATION: 99 % | RESPIRATION RATE: 14 BRPM | TEMPERATURE: 97.8 F | DIASTOLIC BLOOD PRESSURE: 80 MMHG

## 2024-10-10 PROCEDURE — G0151 HHCP-SERV OF PT,EA 15 MIN: HCPCS

## 2024-10-10 SDOH — HEALTH STABILITY: PHYSICAL HEALTH: EXERCISE COMMENTS: PROM HIP ABD, 2X10   - R PROM HIP CIRCUMDUCTION, CW AND CCW 1X10

## 2024-10-10 SDOH — HEALTH STABILITY: PHYSICAL HEALTH
EXERCISE COMMENTS: M PROM HEEL SLIDES.    PT PERFORMED THE FOLLOWING EXERCISES:  - QUAD SET, 1 X 10  - ISO, HS, 1 X 10  - GLUTE SET, 1 X 10  - PROM, HEEL SLIDES, 3 X10   - PROM, R KNEE FLEX WITH LARGE BOLSTER, 1 X 5  -PROM, HEEL SLIDES, 1 X 10  - R PROM, SLR, 1X10  - R

## 2024-10-10 SDOH — HEALTH STABILITY: PHYSICAL HEALTH
EXERCISE COMMENTS: PT CUED PT TO PERFORM LESS ANKLE PF ROM WITH ISO, HS ACTIVATION TO MAINTAIN POST-OP SURIGICAL PRECAUTIONS. PT VERY CHALLENGED WITH PROPRIOCEPTION AND RELAXING WITH PROM OF R HEEL SLIDE. PT REQUIRED HEAVY CUES WITH LARGE BOLSTER TO RELAX RLE TO PERFOR

## 2024-10-10 ASSESSMENT — ENCOUNTER SYMPTOMS
MUSCLE WEAKNESS: 1
LIMITED RANGE OF MOTION: 1
PERSON REPORTING PAIN: PATIENT
DENIES PAIN: 1

## 2024-10-10 ASSESSMENT — ACTIVITIES OF DAILY LIVING (ADL): AMBULATION ASSISTANCE ON FLAT SURFACES: 1

## 2024-10-14 ENCOUNTER — OFFICE VISIT (OUTPATIENT)
Dept: ORTHOPEDIC SURGERY | Facility: HOSPITAL | Age: 59
End: 2024-10-14
Payer: COMMERCIAL

## 2024-10-14 ENCOUNTER — HOSPITAL ENCOUNTER (OUTPATIENT)
Dept: RADIOLOGY | Facility: HOSPITAL | Age: 59
Discharge: HOME | End: 2024-10-14
Payer: COMMERCIAL

## 2024-10-14 DIAGNOSIS — S82.141A TIBIAL PLATEAU FRACTURE, RIGHT, CLOSED, INITIAL ENCOUNTER: ICD-10-CM

## 2024-10-14 PROBLEM — H11.439 CONJUNCTIVAL HYPEREMIA: Status: ACTIVE | Noted: 2024-10-14

## 2024-10-14 PROBLEM — H04.122 DRY EYE SYNDROME OF LEFT EYE: Status: ACTIVE | Noted: 2024-10-14

## 2024-10-14 PROBLEM — M79.669 CALF PAIN: Status: ACTIVE | Noted: 2024-10-14

## 2024-10-14 PROBLEM — L97.209 ULCER OF CALF (MULTI): Status: ACTIVE | Noted: 2024-10-14

## 2024-10-14 PROBLEM — H52.10 MYOPIA: Status: ACTIVE | Noted: 2024-10-14

## 2024-10-14 PROBLEM — H40.059 RAISED INTRAOCULAR PRESSURE: Status: ACTIVE | Noted: 2024-10-14

## 2024-10-14 PROBLEM — H04.123 DRY EYES: Status: ACTIVE | Noted: 2024-10-14

## 2024-10-14 PROBLEM — B02.8 HERPES ZOSTER DERMATITIS: Status: ACTIVE | Noted: 2024-10-14

## 2024-10-14 PROBLEM — H04.121 DRY EYE SYNDROME OF RIGHT EYE: Status: ACTIVE | Noted: 2024-10-14

## 2024-10-14 PROBLEM — L30.8 HERPES ZOSTER DERMATITIS: Status: ACTIVE | Noted: 2024-10-14

## 2024-10-14 PROBLEM — E03.9 HYPOTHYROIDISM: Status: ACTIVE | Noted: 2024-10-14

## 2024-10-14 PROBLEM — Z86.19 HISTORY OF HERPES ZOSTER: Status: ACTIVE | Noted: 2024-10-14

## 2024-10-14 PROCEDURE — 73590 X-RAY EXAM OF LOWER LEG: CPT | Mod: RT

## 2024-10-14 PROCEDURE — 73560 X-RAY EXAM OF KNEE 1 OR 2: CPT | Mod: RIGHT SIDE | Performed by: RADIOLOGY

## 2024-10-14 PROCEDURE — 99211 OFF/OP EST MAY X REQ PHY/QHP: CPT | Performed by: PHYSICIAN ASSISTANT

## 2024-10-14 PROCEDURE — 73590 X-RAY EXAM OF LOWER LEG: CPT | Mod: RIGHT SIDE | Performed by: RADIOLOGY

## 2024-10-14 PROCEDURE — 73560 X-RAY EXAM OF KNEE 1 OR 2: CPT | Mod: RT

## 2024-10-14 PROCEDURE — 1036F TOBACCO NON-USER: CPT | Performed by: PHYSICIAN ASSISTANT

## 2024-10-14 NOTE — PROGRESS NOTES
Patient is a 59 y.o. female who is 3 weeks s/p ORIF R lateral tibial plateau depressed fracture with allograft bone graft and repair peripheral tear lateral meniscus.  Date of surgery was 9/27/2024.  Patient continues NWB RLE at this time and denies issues with incision. Patient continues on ASA for DVT ppx. Patient continues with therapy sessions, performing exercise program at home. Patient denies fever or chills, N/T or calf pain.     General: Alert and oriented x 3, NAD, respirations easy and unlabored with no audible wheezes, skin warm and dry, speech and dress appropriate for noted age, affect euthymic.     Musculoskeletal: RLE  incisions c/d/I with steri strips in place  mild swelling to lower leg  compartments soft  no calf tenderness  sensation intact to light touch  motor intact including TA/GS/EHL  palpable DP/PT pulses 2+     X-ray: Images of R knee  reviewed personally by me today and reveal maintenance of alignment of lateral tibial plateau fracture with hardware in position and no interval change.      IMP:  Problem List Items Addressed This Visit       Tibial plateau fracture, right, closed, initial encounter    Relevant Orders    XR knee right 1-2 views    XR tibia fibula right 2 views       PLAN:  She will continue NWB RLE with hinged knee brace on. Updated protocol sent home with patient to give to HC therapist as she can now A/PROM R knee and advance 10 degrees flexion each week, but there are no hinges to adjust on this particular brace. She can shower, no tub soaks.  I will see patient back in 4 weeks and I need x-rays right knee next visit. All questions were answered today.

## 2024-10-15 ENCOUNTER — HOME CARE VISIT (OUTPATIENT)
Dept: HOME HEALTH SERVICES | Facility: HOME HEALTH | Age: 59
End: 2024-10-15
Payer: COMMERCIAL

## 2024-10-15 VITALS
DIASTOLIC BLOOD PRESSURE: 83 MMHG | SYSTOLIC BLOOD PRESSURE: 149 MMHG | TEMPERATURE: 98.1 F | OXYGEN SATURATION: 98 % | RESPIRATION RATE: 18 BRPM | HEART RATE: 106 BPM

## 2024-10-15 PROCEDURE — G0151 HHCP-SERV OF PT,EA 15 MIN: HCPCS

## 2024-10-15 SDOH — HEALTH STABILITY: PHYSICAL HEALTH
EXERCISE COMMENTS: - GLUTE SET, ISO  - LONG SITTING, A-AROM, HEEL SLIDE  - LONG SITTING, AROM, SAQ  - SEATED, LAQ  - SEATED, KNEE FLEX

## 2024-10-15 SDOH — HEALTH STABILITY: PHYSICAL HEALTH
EXERCISE COMMENTS: WS WITH BACK SUPPORT.  PT INSTRUCTED AND EDUCATED PT IN UPDATED HEP. PT RECOMMENDED PT PERFORM BICEP CURLS WITH DUMB BELLS TO BALANCE BUE STRENGTH AND REDUCE STRAIN ON TRICPES WITH TRANSFERS.    PT PERFORMED SEATED, 1 X 10:  - QS, ISO  - HS SET, ISO

## 2024-10-15 SDOH — HEALTH STABILITY: PHYSICAL HEALTH
EXERCISE COMMENTS: FORT AND ALLOW FOR ROM. PT DEMO GOOD ECCENTRIC CONTROL WITH SAQ AND LAQ. PT CUED PT TO ABD R HIP FOR LAQ TO PREVENT R KNEE VALGUS. PT PERFORMED LAQ AND KNEE FLEXION WITH IMPROVED ROM WITHIN 70 DEG WITH IMPROVED POSITION SITTING ON HIGHER SEAT/2 PILLO

## 2024-10-15 SDOH — HEALTH STABILITY: PHYSICAL HEALTH
EXERCISE COMMENTS: PT EDUCATED PT IN PERFORMING A-AROM-AROM WITH THEREX. KNEE BRACE DOES NOT PERMIT INCREASING BY 10 DEGRESS SO INCREASED FROM 60DEG TO 75DEG. PT EDUCATED PT IN PERFORMING THEREX TO 70 DEG TO MAINTAIN PRECAUTIONS. PT ADJUSTED KNEE BRACE TO REDUCE DISCOM

## 2024-10-15 ASSESSMENT — ENCOUNTER SYMPTOMS
DENIES PAIN: 1
LIMITED RANGE OF MOTION: 1
PERSON REPORTING PAIN: PATIENT
MUSCLE WEAKNESS: 1

## 2024-10-17 ENCOUNTER — HOME CARE VISIT (OUTPATIENT)
Dept: HOME HEALTH SERVICES | Facility: HOME HEALTH | Age: 59
End: 2024-10-17
Payer: COMMERCIAL

## 2024-10-17 VITALS
TEMPERATURE: 97.7 F | SYSTOLIC BLOOD PRESSURE: 133 MMHG | RESPIRATION RATE: 16 BRPM | HEART RATE: 81 BPM | OXYGEN SATURATION: 99 % | DIASTOLIC BLOOD PRESSURE: 76 MMHG

## 2024-10-17 PROCEDURE — G0151 HHCP-SERV OF PT,EA 15 MIN: HCPCS

## 2024-10-17 SDOH — HEALTH STABILITY: PHYSICAL HEALTH
EXERCISE COMMENTS: PT PERFORMED THE FOLLOWING THEREX:  -ISO, QUAD SET, 1 X 15  -ISO, HS ACTIVATION, 1 X 15  - ISO, GLUTE SET, 1 X 15  - R KNEE FLEX, 1 X 20  - R, A-AROM, HEEL SLIDES, 1 X 20  - SEATED, LAQ, 1 X 20  - SEATED, KNEE FLEX, 1 X 20  - R SAQ, 1 X 20

## 2024-10-17 ASSESSMENT — ENCOUNTER SYMPTOMS
PERSON REPORTING PAIN: PATIENT
DENIES PAIN: 1

## 2024-10-17 ASSESSMENT — ACTIVITIES OF DAILY LIVING (ADL)
BATHING_CURRENT_FUNCTION: SUPERVISION
BATHING ASSESSED: 1

## 2024-10-23 ENCOUNTER — HOME CARE VISIT (OUTPATIENT)
Dept: HOME HEALTH SERVICES | Facility: HOME HEALTH | Age: 59
End: 2024-10-23
Payer: COMMERCIAL

## 2024-10-23 VITALS
SYSTOLIC BLOOD PRESSURE: 119 MMHG | HEART RATE: 98 BPM | TEMPERATURE: 97.8 F | DIASTOLIC BLOOD PRESSURE: 82 MMHG | RESPIRATION RATE: 14 BRPM | OXYGEN SATURATION: 97 %

## 2024-10-23 PROCEDURE — G0151 HHCP-SERV OF PT,EA 15 MIN: HCPCS

## 2024-10-23 SDOH — HEALTH STABILITY: PHYSICAL HEALTH
EXERCISE COMMENTS: , GLUTE SET, 1 X 20  - SUPINE, HEEL SLIDES, 1 X 20  - SAQ, 1 X 20  - ANKLE DF/PF, 1 X 10  - LAQ, 1 X10  - SUPINE, HIP ABD 1 X 20  - SEATED, R KNEE FLEXION, 1 X 10

## 2024-10-23 SDOH — HEALTH STABILITY: PHYSICAL HEALTH
EXERCISE COMMENTS: PT REPORTED SHE HAS BEEN USING WEIGHTS FOR BUE EXERCISES TO IMPROVE BUE STRENGTH AND TRANSFER SKILLS. PT INSTRUCTED AND EDUCATED PT IN UPDATED HEP.   PT PERFORMED THE FOLLOWING EXERCISES SEATED:  - ISO, HS ACTIVATION, 1 X 20  - ISO, QS, 1 X 20  - ISO

## 2024-10-23 ASSESSMENT — ENCOUNTER SYMPTOMS
LIMITED RANGE OF MOTION: 1
PERSON REPORTING PAIN: CAREGIVER
MUSCLE WEAKNESS: 1

## 2024-10-23 ASSESSMENT — ACTIVITIES OF DAILY LIVING (ADL): AMBULATION ASSISTANCE ON FLAT SURFACES: 1

## 2024-10-25 ENCOUNTER — HOME CARE VISIT (OUTPATIENT)
Dept: HOME HEALTH SERVICES | Facility: HOME HEALTH | Age: 59
End: 2024-10-25
Payer: COMMERCIAL

## 2024-10-25 VITALS
DIASTOLIC BLOOD PRESSURE: 82 MMHG | TEMPERATURE: 98.5 F | SYSTOLIC BLOOD PRESSURE: 129 MMHG | HEART RATE: 114 BPM | OXYGEN SATURATION: 97 % | RESPIRATION RATE: 19 BRPM

## 2024-10-25 PROCEDURE — G0151 HHCP-SERV OF PT,EA 15 MIN: HCPCS

## 2024-10-25 SDOH — HEALTH STABILITY: PHYSICAL HEALTH
EXERCISE COMMENTS: UPINE, HEEL SLIDE  - SUPINE, HIP ABD (1X15)  - SUPINE, SAQ  - SEATED, KNEE FLEX  - SEATED, LAQ  - STANDING, HIP ABD  - STANDING, HIP EXT

## 2024-10-25 SDOH — HEALTH STABILITY: PHYSICAL HEALTH
EXERCISE COMMENTS: N NWB ON RLE. PT WILL BENEFIT FROM L QL EXERCISES ONCE NWB PRECAUTIONS LIFTED TO REDUCE R HIP ELEVATION AND PROMOTE NEUTRAL PELVIS.     PT PERFORMED THE FOLLOWING EXERCISES 1 X 20:  - SEATED, ISO HS SET  - SEATED, ISO QS  - SEATED, ISO GLUTE SET  - S

## 2024-10-25 SDOH — HEALTH STABILITY: PHYSICAL HEALTH
EXERCISE COMMENTS: PT EDUCATED PT TO KEEP HIP AND KNEE IN NEUTRAL WITH HIP ABD TO PREVENT HIP ER. PT DEMO R POSTERIOR HIP ROTATION W/ HIP EXT. PT CUED PT TO MAINTAIN NEUTRAL PELVIS WITH HIP EXT. PT DEMO R HIP ELEVATION WITH STANDING HIP EXT AND ABD EXERCISES TO MAINTAI

## 2024-10-25 ASSESSMENT — ENCOUNTER SYMPTOMS
HIGHEST PAIN SEVERITY IN PAST 24 HOURS: 1/10
PAIN: 1
PERSON REPORTING PAIN: PATIENT

## 2024-10-28 ENCOUNTER — HOME CARE VISIT (OUTPATIENT)
Dept: HOME HEALTH SERVICES | Facility: HOME HEALTH | Age: 59
End: 2024-10-28
Payer: COMMERCIAL

## 2024-10-28 PROCEDURE — G0151 HHCP-SERV OF PT,EA 15 MIN: HCPCS

## 2024-10-28 ASSESSMENT — ENCOUNTER SYMPTOMS
PAIN: 1
PERSON REPORTING PAIN: PATIENT

## 2024-10-31 ENCOUNTER — HOME CARE VISIT (OUTPATIENT)
Dept: HOME HEALTH SERVICES | Facility: HOME HEALTH | Age: 59
End: 2024-10-31
Payer: COMMERCIAL

## 2024-10-31 VITALS
HEART RATE: 105 BPM | SYSTOLIC BLOOD PRESSURE: 138 MMHG | DIASTOLIC BLOOD PRESSURE: 80 MMHG | OXYGEN SATURATION: 98 % | TEMPERATURE: 98.4 F

## 2024-10-31 PROCEDURE — G0151 HHCP-SERV OF PT,EA 15 MIN: HCPCS

## 2024-10-31 SDOH — HEALTH STABILITY: PHYSICAL HEALTH
EXERCISE COMMENTS: SINK.PT INSTRUCTED AND EDUCATED PT IN UPDATED HEP.*    PT PERFORMED THE FOLLOWING EXERCISES WITH NEW BRACE ON, SET TO 90 DEG:  - SEATED, ANKLE PF/DF  - SEATED, R LAQ  - SEATED, R KNEE FLEX    STANDING ON 3IN STEP PLATFORM*:  - R KNEE FLEX  - R HIP E

## 2024-10-31 SDOH — HEALTH STABILITY: PHYSICAL HEALTH
EXERCISE COMMENTS: D PERFORMANCE WITH PELVIS IN NEUTRAL ON STEP PLATFORM FOR STANDING THEREX. PT REQUIRED VC'S AND TCS TO REDUCE R HIP FLEXION COMPENSATION AND R HIP ELEVATION WITH KNEE FLEXION. PT INSTRUCTED PT TO STEP ONTO STEP PLATFORM FROM CHAIR WITH SBA AT KITCHEN

## 2024-10-31 SDOH — HEALTH STABILITY: PHYSICAL HEALTH
EXERCISE COMMENTS: PT REQUIRED TCS AND VCS TO IMPROVE ANKLE DF WITH LAQ TO IMPROVE KNEE EXT AND QUAD ACTIVATION.   PT DEMO POSTERIOR R INOMINATE ROTATION AND R HIP ELEVATION WITH R KNEE FLEXION IN STANDING IN ATTEMPT TO CLEAR FLOOR AND MAINTAIN RLE NWB. PT DEMO IMPROVE

## 2024-10-31 SDOH — HEALTH STABILITY: PHYSICAL HEALTH: EXERCISE COMMENTS: XT  - R HIP ABD  - R HIP ABD WITH EXT    SUPINE, R ILIOPSOAS STRETCH, 90S*

## 2024-10-31 ASSESSMENT — ENCOUNTER SYMPTOMS
PERSON REPORTING PAIN: PATIENT
HIGHEST PAIN SEVERITY IN PAST 24 HOURS: 2/10
PAIN: 1

## 2024-11-04 ENCOUNTER — HOME CARE VISIT (OUTPATIENT)
Dept: HOME HEALTH SERVICES | Facility: HOME HEALTH | Age: 59
End: 2024-11-04
Payer: COMMERCIAL

## 2024-11-05 ENCOUNTER — HOME CARE VISIT (OUTPATIENT)
Dept: HOME HEALTH SERVICES | Facility: HOME HEALTH | Age: 59
End: 2024-11-05
Payer: COMMERCIAL

## 2024-11-05 VITALS
DIASTOLIC BLOOD PRESSURE: 90 MMHG | HEART RATE: 87 BPM | TEMPERATURE: 97.9 F | OXYGEN SATURATION: 98 % | RESPIRATION RATE: 20 BRPM | SYSTOLIC BLOOD PRESSURE: 150 MMHG

## 2024-11-05 PROCEDURE — G0151 HHCP-SERV OF PT,EA 15 MIN: HCPCS

## 2024-11-05 SDOH — HEALTH STABILITY: PHYSICAL HEALTH
EXERCISE COMMENTS: PT REQUIRED CUES TO PERFORM R KNEE FLEXION PROPERLY IN CHAIR WITHOUT KNEE BRACE TO ACHIEVE PERMITTED 100 DEG. PT EDUCATED PT TO PERFORM SEATED R KNEE FLEX WITH BRACE, 2 X15 DAILY TO IMPROVE R KNEE MOBILITY.    PT PERFORMED THE FOLLOWING EXERCISES WIT

## 2024-11-05 SDOH — HEALTH STABILITY: PHYSICAL HEALTH
EXERCISE COMMENTS: H NEW BRACE ON, SET TO 90 DEG:   - SEATED, ANKLE PF/DF   - SEATED, R LAQ   - SEATED, R KNEE FLEX     STANDING ON 3IN STEP PLATFORM*:   - R KNEE FLEX   - R HIP EXT   - R HIP ABD   - R HIP ABD WITH EXT

## 2024-11-05 ASSESSMENT — ENCOUNTER SYMPTOMS
HIGHEST PAIN SEVERITY IN PAST 24 HOURS: 2/10
PAIN: 1
PERSON REPORTING PAIN: PATIENT

## 2024-11-05 ASSESSMENT — ACTIVITIES OF DAILY LIVING (ADL): AMBULATION ASSISTANCE ON FLAT SURFACES: 1

## 2024-11-11 ENCOUNTER — HOME CARE VISIT (OUTPATIENT)
Dept: HOME HEALTH SERVICES | Facility: HOME HEALTH | Age: 59
End: 2024-11-11
Payer: COMMERCIAL

## 2024-11-11 VITALS
DIASTOLIC BLOOD PRESSURE: 84 MMHG | TEMPERATURE: 97 F | HEART RATE: 107 BPM | SYSTOLIC BLOOD PRESSURE: 132 MMHG | OXYGEN SATURATION: 97 %

## 2024-11-11 PROCEDURE — G0151 HHCP-SERV OF PT,EA 15 MIN: HCPCS

## 2024-11-11 SDOH — HEALTH STABILITY: PHYSICAL HEALTH
EXERCISE COMMENTS: TO TAKE A SEATED REST BREAK BETWEEN STANDING EXERCIES TO REDUCE FATIGUE.     PT PERFORMED THE FOLLOWING EXERCISES (1X20):  - SEATED, ANKLE PF/DF   - SEATED, R LAQ   - SEATED, R KNEE FLEX 1X 20, 1 X 10    STANDING ON 3IN STEP PLATFORM*:   - R KNEE FL

## 2024-11-11 SDOH — HEALTH STABILITY: PHYSICAL HEALTH
EXERCISE COMMENTS: PT REQUIRED CUES TO REDUCE R HIP ELEVATION COMPENSATION AND VCS TO REDUCE KNEE EXT WHEN RETURNING TO NEUTRAL WITH R KNEE FLEXION. PT DEMO FATIGUE WITH STANDING THEREX AND INCREASED TENSION IN L HIP. PT EDUCATED PT TO PERFORM 15 REPS INSTEAD OF 20 AND

## 2024-11-11 SDOH — HEALTH STABILITY: PHYSICAL HEALTH: EXERCISE COMMENTS: EX   - R HIP EXT   - R HIP ABD   - R HIP ABD WITH EXT

## 2024-11-11 ASSESSMENT — ENCOUNTER SYMPTOMS
LIMITED RANGE OF MOTION: 1
HIGHEST PAIN SEVERITY IN PAST 24 HOURS: 2/10
PAIN: 1
MUSCLE WEAKNESS: 1
PERSON REPORTING PAIN: PATIENT

## 2024-11-11 ASSESSMENT — ACTIVITIES OF DAILY LIVING (ADL): AMBULATION ASSISTANCE ON FLAT SURFACES: 1

## 2024-11-12 ENCOUNTER — APPOINTMENT (OUTPATIENT)
Facility: CLINIC | Age: 59
End: 2024-11-12
Payer: COMMERCIAL

## 2024-11-14 ENCOUNTER — OFFICE VISIT (OUTPATIENT)
Dept: ORTHOPEDIC SURGERY | Facility: CLINIC | Age: 59
End: 2024-11-14
Payer: COMMERCIAL

## 2024-11-14 ENCOUNTER — HOSPITAL ENCOUNTER (OUTPATIENT)
Dept: RADIOLOGY | Facility: CLINIC | Age: 59
Discharge: HOME | End: 2024-11-14
Payer: COMMERCIAL

## 2024-11-14 ENCOUNTER — LAB (OUTPATIENT)
Dept: LAB | Facility: LAB | Age: 59
End: 2024-11-14
Payer: COMMERCIAL

## 2024-11-14 ENCOUNTER — OFFICE VISIT (OUTPATIENT)
Facility: CLINIC | Age: 59
End: 2024-11-14
Payer: COMMERCIAL

## 2024-11-14 DIAGNOSIS — Z23 NEED FOR VACCINATION: ICD-10-CM

## 2024-11-14 DIAGNOSIS — S82.141A TIBIAL PLATEAU FRACTURE, RIGHT, CLOSED, INITIAL ENCOUNTER: ICD-10-CM

## 2024-11-14 DIAGNOSIS — Z00.00 ANNUAL PHYSICAL EXAM: ICD-10-CM

## 2024-11-14 LAB
CHOLEST SERPL-MCNC: 304 MG/DL (ref 0–199)
CHOLESTEROL/HDL RATIO: 3.5
HDLC SERPL-MCNC: 87.3 MG/DL
LDLC SERPL CALC-MCNC: 199 MG/DL
NON HDL CHOLESTEROL: 217 MG/DL (ref 0–149)
TRIGL SERPL-MCNC: 89 MG/DL (ref 0–149)
VLDL: 18 MG/DL (ref 0–40)

## 2024-11-14 PROCEDURE — 90471 IMMUNIZATION ADMIN: CPT | Performed by: INTERNAL MEDICINE

## 2024-11-14 PROCEDURE — 80061 LIPID PANEL: CPT

## 2024-11-14 PROCEDURE — 73560 X-RAY EXAM OF KNEE 1 OR 2: CPT | Mod: RIGHT SIDE | Performed by: RADIOLOGY

## 2024-11-14 PROCEDURE — 73560 X-RAY EXAM OF KNEE 1 OR 2: CPT | Mod: RT

## 2024-11-14 PROCEDURE — 99211 OFF/OP EST MAY X REQ PHY/QHP: CPT | Performed by: ORTHOPAEDIC SURGERY

## 2024-11-14 PROCEDURE — 90656 IIV3 VACC NO PRSV 0.5 ML IM: CPT | Performed by: INTERNAL MEDICINE

## 2024-11-14 PROCEDURE — 36415 COLL VENOUS BLD VENIPUNCTURE: CPT

## 2024-11-15 ENCOUNTER — HOME CARE VISIT (OUTPATIENT)
Dept: HOME HEALTH SERVICES | Facility: HOME HEALTH | Age: 59
End: 2024-11-15
Payer: COMMERCIAL

## 2024-11-15 VITALS
HEART RATE: 115 BPM | DIASTOLIC BLOOD PRESSURE: 82 MMHG | RESPIRATION RATE: 22 BRPM | TEMPERATURE: 98.1 F | OXYGEN SATURATION: 97 % | SYSTOLIC BLOOD PRESSURE: 128 MMHG

## 2024-11-15 PROCEDURE — G0151 HHCP-SERV OF PT,EA 15 MIN: HCPCS

## 2024-11-15 RX ORDER — AMOXICILLIN 500 MG/1
2000 TABLET, FILM COATED ORAL ONCE
Qty: 4 TABLET | Refills: 0 | Status: SHIPPED | OUTPATIENT
Start: 2024-11-15 | End: 2024-11-15

## 2024-11-15 SDOH — HEALTH STABILITY: PHYSICAL HEALTH
EXERCISE COMMENTS: CUED PT TO IMPROVE TKE WITHOUT LUMBAR COMPENSATION, GOOD CARRY OVER.     PT PERFORMED THE FOLLOWING EXERCISES IN STANDING AT KITCHEN COUNTER, 1 X10:  - R KNEE FLEXION  - R HIP ABD  - R HIP EXT  - R HIP EXT W/ ABD   - B, TKE

## 2024-11-15 SDOH — HEALTH STABILITY: PHYSICAL HEALTH
EXERCISE COMMENTS: PT REPORTED SHE HAS ONLY DONE HEP ONCE THIS WEEK D/T MEDICAL APPTS AND INCREASED WORKING DEMANDS. PT REQUIRED VC'S AND TC'S TO REDUCE PELVIC COMPENSATION WITH STANDING THEREX. PT CHALLENGED WITH ISOLATING JT SEGMENTS AND DECREASED PROPRIOCEPTION. PT

## 2024-11-15 ASSESSMENT — ENCOUNTER SYMPTOMS
PERSON REPORTING PAIN: PATIENT
DENIES PAIN: 1
HIGHEST PAIN SEVERITY IN PAST 24 HOURS: 0/10

## 2024-11-15 ASSESSMENT — ACTIVITIES OF DAILY LIVING (ADL)
AMBULATION ASSISTANCE ON FLAT SURFACES: 1
AMBULATION_DISTANCE/DURATION_TOLERATED: 150 FT

## 2024-11-15 NOTE — PROGRESS NOTES
chief complaint patient is now 7 weeks status post open reduction internal fixation of right lateral tibial plateau fracture with repair of lateral meniscus on 9/27/2024.    History patient is doing actually quite well she has been nonweightbearing.  Her brace now is fully open.  Her range of motion is full knee extension to almost 110 degrees of knee flexion.  Her swelling is improved greatly she does not have a joint effusion she does have some swelling over the plate her incision is well-healed although I did take her Steri-Strips off today in the office.  Her mechanical axis is well aligned.  She has good plantarflexion and dorsiflexion of the foot with a negative Homans' sign.    Her x-rays display a well aligned tibial plateau which appears to be healed although difficult to tell.  No step-off noted.  No hardware failure.    Assessment 7-week status post ORIF of right tibial plateau with lateral meniscal repair and healthy 59-year-old female.    Plan I am going to let her start weightbearing 50% with the brace on.  And will keep her 50% weightbearing until 15 December at which time I will let her weight-bear as tolerated with the brace on.  She does need to sleep with the brace on she does not need to wear the brace when she is nonweightbearing.  We will see her back again in approximately 4 to 6 weeks I would like to repeat x-ray of the knee at that time.  Will probably discontinue the brace at that point completely.

## 2024-11-16 ENCOUNTER — PATIENT MESSAGE (OUTPATIENT)
Facility: CLINIC | Age: 59
End: 2024-11-16
Payer: COMMERCIAL

## 2024-11-18 ENCOUNTER — APPOINTMENT (OUTPATIENT)
Dept: PRIMARY CARE | Facility: CLINIC | Age: 59
End: 2024-11-18
Payer: COMMERCIAL

## 2024-11-18 ENCOUNTER — HOME CARE VISIT (OUTPATIENT)
Dept: HOME HEALTH SERVICES | Facility: HOME HEALTH | Age: 59
End: 2024-11-18
Payer: COMMERCIAL

## 2024-11-18 VITALS
SYSTOLIC BLOOD PRESSURE: 128 MMHG | DIASTOLIC BLOOD PRESSURE: 74 MMHG | TEMPERATURE: 97.5 F | RESPIRATION RATE: 20 BRPM | HEART RATE: 102 BPM | OXYGEN SATURATION: 99 %

## 2024-11-18 PROCEDURE — G0151 HHCP-SERV OF PT,EA 15 MIN: HCPCS

## 2024-11-18 SDOH — HEALTH STABILITY: PHYSICAL HEALTH
EXERCISE COMMENTS: THEREX WITHHELD FOR GAIT AND STAIR TRAINING WITH NEW WBING STATUS. PT EDUCATED PT TO PERFORM HEP INDEPENDENTLY TODAY AFTER PT.

## 2024-11-18 ASSESSMENT — ENCOUNTER SYMPTOMS
PERSON REPORTING PAIN: PATIENT
DENIES PAIN: 1

## 2024-11-21 ENCOUNTER — HOME CARE VISIT (OUTPATIENT)
Dept: HOME HEALTH SERVICES | Facility: HOME HEALTH | Age: 59
End: 2024-11-21
Payer: COMMERCIAL

## 2024-11-21 VITALS
OXYGEN SATURATION: 100 % | SYSTOLIC BLOOD PRESSURE: 134 MMHG | TEMPERATURE: 97.6 F | DIASTOLIC BLOOD PRESSURE: 80 MMHG | HEART RATE: 94 BPM

## 2024-11-21 PROCEDURE — G0151 HHCP-SERV OF PT,EA 15 MIN: HCPCS

## 2024-11-21 SDOH — HEALTH STABILITY: PHYSICAL HEALTH
EXERCISE COMMENTS: PT PERFORMED THE FOLLOWING EXERCISES:  - R CALF STRETCH WITH STRETCH STRAP, 10S HOLD X 10  - R KNEE FLEX, 10S HOLD X 10

## 2024-11-21 ASSESSMENT — ENCOUNTER SYMPTOMS
LIMITED RANGE OF MOTION: 1
MUSCLE WEAKNESS: 1
LOSS OF SENSATION IN FEET: 0
OCCASIONAL FEELINGS OF UNSTEADINESS: 1
DEPRESSION: 0

## 2024-11-21 ASSESSMENT — ACTIVITIES OF DAILY LIVING (ADL)
AMBULATION_DISTANCE/DURATION_TOLERATED: 250FT
AMBULATION ASSISTANCE ON FLAT SURFACES: 1

## 2024-11-27 ENCOUNTER — HOME CARE VISIT (OUTPATIENT)
Dept: HOME HEALTH SERVICES | Facility: HOME HEALTH | Age: 59
End: 2024-11-27
Payer: COMMERCIAL

## 2024-11-27 VITALS
RESPIRATION RATE: 20 BRPM | SYSTOLIC BLOOD PRESSURE: 144 MMHG | OXYGEN SATURATION: 96 % | TEMPERATURE: 98.2 F | DIASTOLIC BLOOD PRESSURE: 80 MMHG | HEART RATE: 69 BPM

## 2024-11-27 PROCEDURE — G0151 HHCP-SERV OF PT,EA 15 MIN: HCPCS

## 2024-11-27 SDOH — HEALTH STABILITY: PHYSICAL HEALTH: EXERCISE COMMENTS: 15:  - R LAQ  - R KNEE FLEX  SUPINE, 1 X 20:  - R HEEL SLIDES

## 2024-11-27 SDOH — HEALTH STABILITY: PHYSICAL HEALTH
EXERCISE COMMENTS: PT PERFORMED THE FOLLOWING EXERCISES WITHOUT BRACE:     STANDING WITH COUNTERTOP SUPPORT 1X 15:   - R KNEE FLEXION,ON STEP  - R HIP EXT ON STEP  - R HIP ABD ON STEP  - R HIP EXT W/ ABD ON STEP  - R HIP FLEXION  - MINI SQUATS (50% WBING)  SEATED, 1 X

## 2024-11-27 ASSESSMENT — ACTIVITIES OF DAILY LIVING (ADL)
ENTERING_EXITING_HOME: MODERATE ASSIST
OASIS_M1830: 03

## 2024-11-27 ASSESSMENT — ENCOUNTER SYMPTOMS
LOWEST PAIN SEVERITY IN PAST 24 HOURS: 3/10
PAIN: 1
HIGHEST PAIN SEVERITY IN PAST 24 HOURS: 0/10
PERSON REPORTING PAIN: PATIENT
SUBJECTIVE PAIN PROGRESSION: GRADUALLY IMPROVING

## 2024-12-02 ENCOUNTER — APPOINTMENT (OUTPATIENT)
Dept: HOME HEALTH SERVICES | Facility: HOME HEALTH | Age: 59
End: 2024-12-02
Payer: COMMERCIAL

## 2024-12-06 ENCOUNTER — HOME CARE VISIT (OUTPATIENT)
Dept: HOME HEALTH SERVICES | Facility: HOME HEALTH | Age: 59
End: 2024-12-06
Payer: COMMERCIAL

## 2024-12-06 VITALS — RESPIRATION RATE: 18 BRPM | TEMPERATURE: 97.5 F | SYSTOLIC BLOOD PRESSURE: 112 MMHG | DIASTOLIC BLOOD PRESSURE: 80 MMHG

## 2024-12-06 PROCEDURE — G0151 HHCP-SERV OF PT,EA 15 MIN: HCPCS

## 2024-12-06 ASSESSMENT — ENCOUNTER SYMPTOMS
PERSON REPORTING PAIN: PATIENT
PAIN: 1
LOWEST PAIN SEVERITY IN PAST 24 HOURS: 0/10
HIGHEST PAIN SEVERITY IN PAST 24 HOURS: 2/10

## 2024-12-11 ENCOUNTER — HOME CARE VISIT (OUTPATIENT)
Dept: HOME HEALTH SERVICES | Facility: HOME HEALTH | Age: 59
End: 2024-12-11
Payer: COMMERCIAL

## 2024-12-11 VITALS
TEMPERATURE: 98.2 F | DIASTOLIC BLOOD PRESSURE: 84 MMHG | SYSTOLIC BLOOD PRESSURE: 124 MMHG | RESPIRATION RATE: 16 BRPM | HEART RATE: 87 BPM | OXYGEN SATURATION: 97 %

## 2024-12-11 PROCEDURE — G0151 HHCP-SERV OF PT,EA 15 MIN: HCPCS

## 2024-12-11 ASSESSMENT — ENCOUNTER SYMPTOMS
PERSON REPORTING PAIN: PATIENT
PAIN: 1

## 2024-12-13 ENCOUNTER — HOME CARE VISIT (OUTPATIENT)
Dept: HOME HEALTH SERVICES | Facility: HOME HEALTH | Age: 59
End: 2024-12-13
Payer: COMMERCIAL

## 2024-12-13 VITALS
SYSTOLIC BLOOD PRESSURE: 144 MMHG | HEART RATE: 97 BPM | DIASTOLIC BLOOD PRESSURE: 84 MMHG | TEMPERATURE: 98.4 F | RESPIRATION RATE: 14 BRPM | OXYGEN SATURATION: 99 %

## 2024-12-13 PROCEDURE — G0151 HHCP-SERV OF PT,EA 15 MIN: HCPCS

## 2024-12-13 ASSESSMENT — ENCOUNTER SYMPTOMS
PAIN: 1
PERSON REPORTING PAIN: PATIENT

## 2024-12-18 ENCOUNTER — HOME CARE VISIT (OUTPATIENT)
Dept: HOME HEALTH SERVICES | Facility: HOME HEALTH | Age: 59
End: 2024-12-18
Payer: COMMERCIAL

## 2024-12-18 VITALS
RESPIRATION RATE: 18 BRPM | DIASTOLIC BLOOD PRESSURE: 82 MMHG | TEMPERATURE: 98 F | HEART RATE: 94 BPM | OXYGEN SATURATION: 100 % | SYSTOLIC BLOOD PRESSURE: 130 MMHG

## 2024-12-18 PROCEDURE — G0151 HHCP-SERV OF PT,EA 15 MIN: HCPCS

## 2024-12-18 ASSESSMENT — ENCOUNTER SYMPTOMS
DENIES PAIN: 1
PERSON REPORTING PAIN: PATIENT

## 2024-12-19 ENCOUNTER — OFFICE VISIT (OUTPATIENT)
Dept: ORTHOPEDIC SURGERY | Facility: CLINIC | Age: 59
End: 2024-12-19
Payer: COMMERCIAL

## 2024-12-19 ENCOUNTER — HOSPITAL ENCOUNTER (OUTPATIENT)
Dept: RADIOLOGY | Facility: CLINIC | Age: 59
Discharge: HOME | End: 2024-12-19
Payer: COMMERCIAL

## 2024-12-19 DIAGNOSIS — S82.141A TIBIAL PLATEAU FRACTURE, RIGHT, CLOSED, INITIAL ENCOUNTER: ICD-10-CM

## 2024-12-19 PROCEDURE — 99211 OFF/OP EST MAY X REQ PHY/QHP: CPT | Performed by: ORTHOPAEDIC SURGERY

## 2024-12-19 PROCEDURE — 73560 X-RAY EXAM OF KNEE 1 OR 2: CPT | Mod: RT

## 2024-12-19 PROCEDURE — 73590 X-RAY EXAM OF LOWER LEG: CPT | Mod: RT

## 2024-12-19 PROCEDURE — 73560 X-RAY EXAM OF KNEE 1 OR 2: CPT | Mod: RIGHT SIDE | Performed by: RADIOLOGY

## 2024-12-19 PROCEDURE — 1036F TOBACCO NON-USER: CPT | Performed by: ORTHOPAEDIC SURGERY

## 2024-12-19 PROCEDURE — 73590 X-RAY EXAM OF LOWER LEG: CPT | Mod: RIGHT SIDE | Performed by: RADIOLOGY

## 2024-12-20 ENCOUNTER — HOME CARE VISIT (OUTPATIENT)
Dept: HOME HEALTH SERVICES | Facility: HOME HEALTH | Age: 59
End: 2024-12-20
Payer: COMMERCIAL

## 2024-12-20 PROCEDURE — G0151 HHCP-SERV OF PT,EA 15 MIN: HCPCS

## 2024-12-20 NOTE — PROGRESS NOTES
Chief complaint patient is almost 3 months status post open internal fixation of right lateral tibial plateau fracture with repair of lateral meniscus from 9/7/2024.    History this 59-year-old patient has been 50% weightbearing in her brace but has not been weightbearing as tolerated.  She does still have pain and weakness in the knee.  She is not walking yet.  She is not taking any narcotic pain medications.    Her physical exam today reveals the knee comes out to nearly full extension and flexes to about 110 degrees.  Her incision is well-healed laterally but her plate is somewhat prominent because of her thin body structure.  Her knee appears to be stable medial laterally anteriorly and posteriorly.  She has about a 1-2+ joint effusion.  She has significant quad atrophy.  Patella has crepitus.    X-rays appear to show the fracture to be healed although the interpretation at the joint line is difficult because of the scaffolding screws.  The joint spaces maintained.  There is no failure of hardware.    Assessment right lateral tibial plateau fracture with some disuse atrophy of muscle and knee function.    Plan I have talked her about going to full weightbearing as tolerated.  I have changed her brace out to a smaller version which I want her to wear for another month.  I want her to go to physical therapy which she is going to go near the [facility] because that is where she lives.  I wrote her for 2-3 times a week gait training weightbearing as tolerated.  I am going to see her back again in the proximal 8 weeks I want a weightbearing view of the right knee at that time.

## 2024-12-21 ASSESSMENT — ACTIVITIES OF DAILY LIVING (ADL)
OASIS_M1830: 01
HOME_HEALTH_OASIS: 00

## 2024-12-24 ENCOUNTER — HOSPITAL ENCOUNTER (OUTPATIENT)
Dept: RADIOLOGY | Facility: CLINIC | Age: 59
Discharge: HOME | End: 2024-12-24
Payer: COMMERCIAL

## 2024-12-24 VITALS — WEIGHT: 114 LBS | HEIGHT: 66 IN | BODY MASS INDEX: 18.32 KG/M2

## 2024-12-24 DIAGNOSIS — Z01.419 ENCOUNTER FOR GYNECOLOGICAL EXAMINATION WITHOUT ABNORMAL FINDING: ICD-10-CM

## 2024-12-24 PROCEDURE — 77063 BREAST TOMOSYNTHESIS BI: CPT | Performed by: RADIOLOGY

## 2024-12-24 PROCEDURE — 77067 SCR MAMMO BI INCL CAD: CPT | Performed by: RADIOLOGY

## 2024-12-24 PROCEDURE — 77067 SCR MAMMO BI INCL CAD: CPT

## 2024-12-26 ENCOUNTER — EVALUATION (OUTPATIENT)
Dept: PHYSICAL THERAPY | Facility: CLINIC | Age: 59
End: 2024-12-26
Payer: COMMERCIAL

## 2024-12-26 ENCOUNTER — APPOINTMENT (OUTPATIENT)
Facility: CLINIC | Age: 59
End: 2024-12-26
Payer: COMMERCIAL

## 2024-12-26 ENCOUNTER — LAB (OUTPATIENT)
Dept: LAB | Facility: LAB | Age: 59
End: 2024-12-26
Payer: COMMERCIAL

## 2024-12-26 VITALS
SYSTOLIC BLOOD PRESSURE: 126 MMHG | HEART RATE: 96 BPM | DIASTOLIC BLOOD PRESSURE: 84 MMHG | BODY MASS INDEX: 18.05 KG/M2 | OXYGEN SATURATION: 97 % | WEIGHT: 115 LBS | HEIGHT: 67 IN

## 2024-12-26 DIAGNOSIS — S82.101A CLOSED FRACTURE OF PROXIMAL END OF RIGHT TIBIA, UNSPECIFIED FRACTURE MORPHOLOGY, INITIAL ENCOUNTER: ICD-10-CM

## 2024-12-26 DIAGNOSIS — Z78.0 ASYMPTOMATIC MENOPAUSE: ICD-10-CM

## 2024-12-26 DIAGNOSIS — M25.561 RIGHT KNEE PAIN: Primary | ICD-10-CM

## 2024-12-26 DIAGNOSIS — Z00.00 ANNUAL PHYSICAL EXAM: ICD-10-CM

## 2024-12-26 DIAGNOSIS — Z00.00 ROUTINE GENERAL MEDICAL EXAMINATION AT A HEALTH CARE FACILITY: Primary | ICD-10-CM

## 2024-12-26 LAB
APPEARANCE UR: CLEAR
BILIRUB UR STRIP.AUTO-MCNC: NEGATIVE MG/DL
COLOR UR: COLORLESS
GLUCOSE UR STRIP.AUTO-MCNC: NORMAL MG/DL
KETONES UR STRIP.AUTO-MCNC: NEGATIVE MG/DL
LEUKOCYTE ESTERASE UR QL STRIP.AUTO: NEGATIVE
NITRITE UR QL STRIP.AUTO: NEGATIVE
PH UR STRIP.AUTO: 5.5 [PH]
PROT UR STRIP.AUTO-MCNC: NEGATIVE MG/DL
RBC # UR STRIP.AUTO: NEGATIVE /UL
SP GR UR STRIP.AUTO: 1.01
UROBILINOGEN UR STRIP.AUTO-MCNC: NORMAL MG/DL

## 2024-12-26 PROCEDURE — 3008F BODY MASS INDEX DOCD: CPT | Performed by: INTERNAL MEDICINE

## 2024-12-26 PROCEDURE — G0439 PPPS, SUBSEQ VISIT: HCPCS | Performed by: INTERNAL MEDICINE

## 2024-12-26 PROCEDURE — 97110 THERAPEUTIC EXERCISES: CPT | Mod: GP

## 2024-12-26 PROCEDURE — 81003 URINALYSIS AUTO W/O SCOPE: CPT

## 2024-12-26 PROCEDURE — 97161 PT EVAL LOW COMPLEX 20 MIN: CPT | Mod: GP

## 2024-12-26 PROCEDURE — 3074F SYST BP LT 130 MM HG: CPT | Performed by: INTERNAL MEDICINE

## 2024-12-26 PROCEDURE — 1036F TOBACCO NON-USER: CPT | Performed by: INTERNAL MEDICINE

## 2024-12-26 PROCEDURE — 3079F DIAST BP 80-89 MM HG: CPT | Performed by: INTERNAL MEDICINE

## 2024-12-26 ASSESSMENT — ENCOUNTER SYMPTOMS
NUMBNESS: 0
ARTHRALGIAS: 0
EYE PAIN: 0
HEMATURIA: 0
PALPITATIONS: 0
DIZZINESS: 0
APNEA: 0
SLEEP DISTURBANCE: 0
POLYDIPSIA: 0
HEADACHES: 0
WHEEZING: 0
ADENOPATHY: 0
COUGH: 0
FREQUENCY: 0
TREMORS: 0
FACIAL SWELLING: 0
APPETITE CHANGE: 0
BACK PAIN: 0
ABDOMINAL PAIN: 0
NAUSEA: 0
BLOOD IN STOOL: 0
DYSURIA: 0
VOMITING: 0
CONFUSION: 0
BRUISES/BLEEDS EASILY: 0
AGITATION: 0
CHEST TIGHTNESS: 0
NERVOUS/ANXIOUS: 0
FEVER: 0
CONSTIPATION: 0
DIARRHEA: 0
FLANK PAIN: 0
MYALGIAS: 0
SORE THROAT: 0
SHORTNESS OF BREATH: 0
EYE REDNESS: 0
FATIGUE: 0
SINUS PRESSURE: 0
JOINT SWELLING: 0

## 2024-12-26 ASSESSMENT — PAIN SCALES - GENERAL: PAINLEVEL_OUTOF10: 2

## 2024-12-26 NOTE — PROGRESS NOTES
Physical Exam    Name Amara Cote    Date of Service :12/26/2024      Amara Cote  59 y.o. is here for an annual physical exam/CPE    R Tibial plateau fracture-9/24    HTN    HLD    Past Medical History:   Diagnosis Date    Dry eye syndrome of bilateral lacrimal glands 11/18/2022    Bilateral dry eyes    Encounter for screening for malignant neoplasm of colon 10/17/2018    Colon cancer screening    Other chest pain 12/21/2015    Atypical chest pain    Other specified disorders of eye and adnexa 04/03/2019    Eye irritation    Other specified health status 08/29/2017    Birth control    Personal history of other endocrine, nutritional and metabolic disease 10/10/2018    History of hypothyroidism    Personal history of other endocrine, nutritional and metabolic disease 10/22/2018    History of high cholesterol    Personal history of other endocrine, nutritional and metabolic disease 09/30/2019    History of hypothyroidism    Personal history of other infectious and parasitic diseases     History of herpes zoster    Zoster with other complications 11/18/2022    Herpes zoster dermatitis       Review of Systems   Constitutional:  Negative for appetite change, fatigue and fever.   HENT:  Negative for congestion, ear discharge, ear pain, facial swelling, mouth sores, sinus pressure and sore throat.    Eyes:  Negative for pain, redness and visual disturbance.   Respiratory:  Negative for apnea, cough, chest tightness, shortness of breath and wheezing.    Cardiovascular:  Negative for chest pain, palpitations and leg swelling.   Gastrointestinal:  Negative for abdominal pain, blood in stool, constipation, diarrhea, nausea and vomiting.   Endocrine: Negative for polydipsia and polyuria.   Genitourinary:  Negative for dysuria, flank pain, frequency, hematuria and urgency.   Musculoskeletal:  Negative for arthralgias, back pain, gait problem, joint swelling and myalgias.   Skin:  Negative for pallor and rash.  "  Neurological:  Negative for dizziness, tremors, numbness and headaches.   Hematological:  Negative for adenopathy. Does not bruise/bleed easily.   Psychiatric/Behavioral:  Negative for agitation, confusion and sleep disturbance. The patient is not nervous/anxious.            Past Surgical History:   Procedure Laterality Date    MOUTH SURGERY  09/09/2014    Oral Surgery Tooth Extraction    TONSILLECTOMY  09/09/2014    Tonsillectomy With Adenoidectomy        Social History     Tobacco Use    Smoking status: Never    Smokeless tobacco: Never   Vaping Use    Vaping status: Never Used   Substance Use Topics    Alcohol use: Yes     Alcohol/week: 7.0 standard drinks of alcohol     Types: 7 Standard drinks or equivalent per week    Drug use: Never        Social History     Social History Narrative    Not on file       Family History   Problem Relation Name Age of Onset    Sarcoidosis Mother      Coronary artery disease Father      Hypertension Father      Stroke Father      Cancer Cousin      Breast cancer Cousin      Blindness Other AUNT     Cancer Other GRANDFATHER         /86 (BP Location: Left arm, Patient Position: Sitting, BP Cuff Size: Adult)   Pulse 96   Ht 1.689 m (5' 6.5\")   Wt 52.2 kg (115 lb)   LMP  (LMP Unknown)   SpO2 97%   BMI 18.28 kg/m²     Physical Exam  Constitutional:       General: She is not in acute distress.     Appearance: Normal appearance. She is not diaphoretic.   HENT:      Head: Normocephalic and atraumatic.      Right Ear: Tympanic membrane, ear canal and external ear normal.      Left Ear: Tympanic membrane, ear canal and external ear normal.      Nose: No congestion or rhinorrhea.      Mouth/Throat:      Mouth: Mucous membranes are moist.      Pharynx: Oropharynx is clear. No oropharyngeal exudate or posterior oropharyngeal erythema.   Eyes:      Extraocular Movements: Extraocular movements intact.      Conjunctiva/sclera: Conjunctivae normal.      Pupils: Pupils are equal, " round, and reactive to light.   Neck:      Vascular: No carotid bruit.   Cardiovascular:      Rate and Rhythm: Normal rate and regular rhythm.      Pulses: Normal pulses.      Heart sounds: Normal heart sounds. No murmur heard.     No friction rub.   Pulmonary:      Effort: No respiratory distress.      Breath sounds: No wheezing or rales.   Chest:      Chest wall: No tenderness.   Abdominal:      General: Abdomen is flat. Bowel sounds are normal. There is no distension.      Palpations: Abdomen is soft. There is no mass.      Tenderness: There is no abdominal tenderness. There is no guarding or rebound.      Hernia: No hernia is present.   Musculoskeletal:         General: No swelling, tenderness or deformity.      Cervical back: Normal range of motion and neck supple. No rigidity or tenderness.   Lymphadenopathy:      Cervical: No cervical adenopathy.   Skin:     General: Skin is warm.      Coloration: Skin is not jaundiced or pale.      Findings: No bruising, erythema, lesion or rash.   Neurological:      General: No focal deficit present.      Mental Status: She is alert and oriented to person, place, and time.      Motor: No weakness.      Coordination: Coordination normal.      Gait: Gait normal.      Deep Tendon Reflexes: Reflexes normal.   Psychiatric:         Mood and Affect: Mood normal.         Behavior: Behavior normal.             Problem List Items Addressed This Visit    None      Assessment/Plan       R Tibial plateau fracture-9/24- Follow up with PT /Orthopedics.Recommend BMD     HTN- -Goal < 130/80-  Encouraged sodium restriction, DASH or Mediterranean diet  -continue with Amlodipine 5 mg daily.    HLD- Limit animal fats( red meat, cheese, shell fish,  egg yolks, full fat dairy/yoghurt and processed meats).  Instead, replace some of the saturated fats in your diet with healthier unsaturated fats, which are found in fish, nuts, avocados and vegetable oils, such as olive oil, canola oil and safflower  oil.   Ten year ACC risk =3%  CT-Cardiac score( 2023)=0     CRC-2018    Recommend TDAP    Continue with current Rx    Follow up in 6 months/PRN

## 2024-12-26 NOTE — PATIENT INSTRUCTIONS
R Tibial plateau fracture-9/24- Follow up with PT /Orthopedics.Recommend BMD     HTN- -Goal < 130/80-  Encouraged sodium restriction, DASH or Mediterranean diet  -continue with Amlodipine 5 mg daily.    HLD- Limit animal fats( red meat, cheese, shell fish,  egg yolks, full fat dairy/yoghurt and processed meats).  Instead, replace some of the saturated fats in your diet with healthier unsaturated fats, which are found in fish, nuts, avocados and vegetable oils, such as olive oil, canola oil and safflower oil.   Ten year ACC risk =3%  CT-Cardiac score( 2023)=0     CRC-2018    Recommend TDAP    Continue with current Rx    Follow up in 6 months/PRN

## 2024-12-26 NOTE — PROGRESS NOTES
Physical Therapy Evaluation     Patient Name: Amara Cote  MRN: [id number]  Today's Date: 12/26/2024  Time Calculation  Start Time: 0917  Stop Time: 1007  Time Calculation (min): 50 min      Insurance:  Number of Treatments Authorized: 1/Mn (DED 3200 80/20 COVERAGE V MED NEC REF#[id number])          Current Problem:   1. Right knee pain  Follow Up In Physical Therapy          Precautions:  Precautions  Precautions Comment: Low fall risk (WBAT with brace donned)    Reason for visit: R knee pain s/p ORIF for Tibial plateau fracture on 9/27/24   Referred by: Dr. Cole    HPI: Fell walking into a restaurant on 9/17/24 walked on it the remainder of the day.  Got xrays later that day.   Was nonweightbearing until November was progressed to 50%.  Then progressed to WBAT with brace donned on 12/19/24, was told she is getting a smaller brace.  Had home PT until starting in October and up until 12/18/24.  Next Follow up with MD is in February 2025.     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 Works [place] no rush to get back.    Leisure, mechanical stresses: Hiking x 3 a week.  And weight lifting.   Functional disability since surgery/Goals for PT: Walker for longer distances, and single crutch around the house.   Home environment: 1st floor master.  A few steps to enter/exit.   Available assist:  is semi retired.    Equipment/AD: Walker and crutches.   Constant symptoms: none   Intermittent symptoms: Med/lat R knee   Pain ranges from: 0-3/10  Pt describes pain as: tightness  Symptoms are worse with: AM, Walking/weight bearing.   Symptoms are better with: rest, non weight bearing    Objective Findings:  Outcome Measures:  Other Measures  Lower Extremity Funtional Score (LEFS): 23/80  Knee ROM - Nil/Min/Mod/Max loss or degrees.  Flex: R = 111 - ERP , L = 147   Ext: R = 0-1, L = 3-0    MMT:   Hip  Flex: R = 4/5, L = 5/5    ABd:   R = 4/5, L = 5/5                               IR: R  "= 4-/5, L = 5/5  ER:  R = 4/5, L = 5/5  Knee    Flex: R = 4-/5, L = 5/5    Ext: R = 4/5, L = 5/5    Gait deviations: Amb with wheeled walker into PT, step to pattern     Treatment: with brace donned and standing at plinth for UE assist   Squats x 10  R/L hip abd/ext x 5 each LE  Heel/toe raises x 5  Semiloaded knee ext with self OP x 5  Seated knee flexion with self OP x 5  Amb with std cane 60 ft x 2 - cues for pattern/speed  Educated pt on proper response to exercise, produce/increase - NW principle, and healing process.  Issued handout for HEP  HEP/med bridge:   Access Code: [email or web address]  URL: [email or web address]  Date: 12/26/2024  Prepared by: Gabriele Long  Program Notes - Be sure to equalize weight bearing during standing exercisesWalk with a more fliud pattern   Exercises  - Seated Knee Flexion Slide  - 3-4 x daily - 7 x weekly - 1 sets - 10-20 reps - 5 hold  - Seated Quad Set  - 3-4 x daily - 7 x weekly - 1 sets - 10-20 reps - 5 hold    Assessment: Pt presents to PT with complaint of R knee pain s/p s/p ORIF for Tibial plateau fracture by Dr. Cole. Pt will benefit from skilled PT to address ROM/strength/functional limitations and pain noted during evaluation today.    Plan of Care:  Problem List: Pain, Functional limitations, activity limitations, ROM limitations, Posture, Gait, Transfer, Activity Limitations, IADLS/ADLS/Self care  Goals:  STG:  Pain = 0-3/10 R knee by week 4  Pt will be compliant with HEP by week 1  Pt will be able to amb with 1 crutch with \"normal\" gait pattern and speed by week 4  LTG:  R knee ROM = L knee/nil limit in all directions by week 6  5/5 B LEs with MMTing by week 8  Pt will be able to go up down full flight of stairs without pain/deviations by week 6   Pt will ahcieve a clinically significant improvement in LEFS by week 12  Pt will report >/= 80% improvement/progress towards PT goals by week 12  Pt will be bale to amb without deviations at \"normal\" speed " "by week 8  Pt will be able to go on her \"typical\" hikes by week 12  Planned interventions: Ther ex, Neuromuscular re-ed, ther act, Manual, Education, Home program, Estim, Hot therapy, Cold therapy, Vaso  The POC was created, discussed, and agreed on with the patient.   "

## 2025-01-03 ENCOUNTER — TREATMENT (OUTPATIENT)
Dept: PHYSICAL THERAPY | Facility: CLINIC | Age: 60
End: 2025-01-03
Payer: COMMERCIAL

## 2025-01-03 DIAGNOSIS — M25.561 ACUTE PAIN OF RIGHT KNEE: Primary | ICD-10-CM

## 2025-01-03 DIAGNOSIS — M25.561 RIGHT KNEE PAIN: ICD-10-CM

## 2025-01-03 PROCEDURE — 97530 THERAPEUTIC ACTIVITIES: CPT | Mod: GP | Performed by: PHYSICAL THERAPIST

## 2025-01-03 PROCEDURE — 97110 THERAPEUTIC EXERCISES: CPT | Mod: GP | Performed by: PHYSICAL THERAPIST

## 2025-01-03 ASSESSMENT — PAIN SCALES - GENERAL: PAINLEVEL_OUTOF10: 2

## 2025-01-03 ASSESSMENT — PAIN - FUNCTIONAL ASSESSMENT: PAIN_FUNCTIONAL_ASSESSMENT: 0-10

## 2025-01-03 NOTE — PROGRESS NOTES
Physical Therapy Treatment    Patient Name: Amara Cote  MRN: [id number]  Today's Date: 1/3/2025  Time Calculation  Start Time: 1308  Stop Time: 1355  Time Calculation (min): 47 min  PT Therapeutic Procedures Time Entry  Manual Therapy Time Entry: 5  Neuromuscular Re-Education Time Entry: 2  Therapeutic Exercise Time Entry: 27  Therapeutic Activity Time Entry: 11       Current Problem  Problem List Items Addressed This Visit             ICD-10-CM       Musculoskeletal and Injuries    Knee pain - Primary M25.569     Other Visit Diagnoses         Codes    Right knee pain     M25.561            Insurance:  Number of Treatments Authorized: 2/Mn (DED 3200 80/20 COVERAGE V MED NEC REF#[id number])          Subjective   General  Reason for Referral: R knee pain s/p ORIF  Referred By: SNEHAL Cole  General Comment: Pt reports she's doing fine with the exercises. Pain is minimal ~2/10. Notes she's only done stairs 1x with her HHPT.  Med hx reviewed DG 1/3/25    Performing HEP?: Yes    Precautions  Precautions  Precautions Comment: Low fall risk (WBAT with brace donned)  Pain  Pain Assessment: 0-10  0-10 (Numeric) Pain Score: 2  Pain Type: Acute pain  Pain Location: Knee    Objective    FWW on arrival, brace donned   Full knee ext with OP; 0-2 AROM R knee ext    Treatments:  Therapeutic Exercise  Therapeutic Exercise Performed: Yes  Therapeutic Exercise Activity 1: SciFit L1 Manual x 6 minutes Seat 10-8 x 6 minutes  Therapeutic Exercise Activity 2: R semi-loaded knee ext, quad set +OP x 2 min  Therapeutic Exercise Activity 3: Gastroc stretch slantboard x 1 min  Therapeutic Exercise Activity 4: Heel raises x 1 min  Therapeutic Exercise Activity 5: R,L hip abduction x 10 reps each  Therapeutic Exercise Activity 6: R,L hip extension x 10 reps each  Therapeutic Exercise Activity 7: R knee flexion in slight hip ext x 1 min  Therapeutic Exercise Activity 8: R LAQ x 1 min    Therapeutic Activity  Therapeutic Activity Performed:  "Yes  Therapeutic Activity 1: Mini squats x 1 min  Therapeutic Activity 2: Ambulating with SP cane 40' x 8 - cues for technique  Therapeutic Activity 3: R/L lateral steps at rail x 90 sec  Therapeutic Activity 4: R fwd step ups 4\" x 1 min  Therapeutic Activity 5: R lateral step ups 4\" x 1 min with B UE support  Therapeutic Activity 6: L fwd step downs 4\" x 1 min    Balance/Neuromuscular Re-Education  Balance/Neuromuscular Re-Education Activity 1: R,L SLS ground x 30 sec - minimal UE support, R SLS x 30 sec    Manual Therapy  Manual Therapy Performed: Yes  Manual Therapy Activity 1: R patellar mobs sup/inf grade III  Manual Therapy Activity 2: Light R quad release    OP EDUCATION:  Outpatient Education  Education Comment: Access Code: [email or web address]  URL: [email or web address] - is now [employer]/  Date: 01/03/2025  Prepared by: Marisa Palafox    Exercises  - Standing Hip Abduction with Counter Support  - 1 x daily - 7 x weekly - 1-2 sets - 10 reps  - Standing Hip Extension with Counter Support  - 1 x daily - 7 x weekly - 1-2 sets - 10 reps  - Mini Squat with Counter Support  - 1 x daily - 7 x weekly - 1-2 sets - 10 reps  - Heel Raises with Counter Support  - 1 x daily - 7 x weekly - 1-2 sets - 10 reps    Assessment:  PT Assessment  Assessment Comment: Pt showing improved mobility since IE. Fearful of reducing UE support, however showing good stability with ambulating using SP cane. Able to add standing program without increased pain or discomfort, warned on post tx soreness.    Plan:  OP PT Plan  Number of Treatments Authorized: 2/Mn (DED 3200 80/20 COVERAGE V MED [facility] REF#[id number])      Marisa Palafox, PT  "

## 2025-01-07 ENCOUNTER — TREATMENT (OUTPATIENT)
Dept: PHYSICAL THERAPY | Facility: CLINIC | Age: 60
End: 2025-01-07
Payer: COMMERCIAL

## 2025-01-07 DIAGNOSIS — M25.561 RIGHT KNEE PAIN: ICD-10-CM

## 2025-01-07 PROCEDURE — 97112 NEUROMUSCULAR REEDUCATION: CPT | Mod: GP

## 2025-01-07 PROCEDURE — 97110 THERAPEUTIC EXERCISES: CPT | Mod: GP

## 2025-01-07 NOTE — PROGRESS NOTES
Physical Therapy Treatment    Patient Name: Amara Cote  MRN: [id number]  Today's Date: 1/8/2025  Time Calculation  Start Time: 1443  Stop Time: 1531  Time Calculation (min): 48 min  PT Therapeutic Procedures Time Entry  Manual Therapy Time Entry: 15  Therapeutic Exercise Time Entry: 30       Current Problem  Problem List Items Addressed This Visit    None  Visit Diagnoses         Codes    Right knee pain     M25.561            Insurance:  Number of Treatments Authorized: 3/Mn (DED 3200 80/20 COVERAGE V MED NEC REF#[id number])          Subjective   General   The surgeons office said that she should continue with stay in the same brace. There was a miscommunication. Will not see the doctor again until February.  Primary concern is being ready for her Trip in July this year because she will be doing a lot of walking/hiking.     Performing HEP?: Partially - ROM yes but not as much strengthening over the weekend.     Precautions  Precautions  Precautions Comment: Low fall risk (WBAT with brace donned)  Pain   0/10 R knee     Objective    brace donned with B crutches     Treatments: (brace donned for all weight bearing exercises)  Therapeutic Exercise  SciFit no resistance  minutes Seat 10-7 x 4 minutes  R semi-loaded knee ext, quad set +OP x 2 min  R LAQ x 10     NM re-ed:  Gait training with 1 crutch 80ft x 6 - cues for heel toe and apce  Amb with crutch as touch pint/no weihgt bearing 80 ft x 2  Performed Fwd 8 inch step up and down with crutch x 10 - cues for sequencing and SBA/CGA    Therex:  6 inch R lateral step up/down 12 x 2   R Fwd step up 4 inch step x 12  R/L lateral steps yellow loop 1 min x 2     NM re-ed: R SLS to hip hinge x 1 min      OP EDUCATION:   1/8/25: Verbally added 4 inch Fwd step up/down and lateral step up/down    Assessment:   Pt is making good progress, reliance on assistive device is decreasing and has good tolerance to progressions to strengthening/weight bearing exercises. She can be  self limiting at times.  Demo'd understanding of progressions to HEP.    Plan:  Continues to progress gait/decrease use of assistive device if nil/min deviations  Progress balance/function and  generalized LE strengthening as tolerated   OP PT Plan  Number of Treatments Authorized: 3/Mn (DED 3200 80/20 COVERAGE V MED NEC REF#[id number])      Gabriele Long, PT

## 2025-01-10 ENCOUNTER — TREATMENT (OUTPATIENT)
Dept: PHYSICAL THERAPY | Facility: CLINIC | Age: 60
End: 2025-01-10
Payer: COMMERCIAL

## 2025-01-10 DIAGNOSIS — M25.561 RIGHT KNEE PAIN: ICD-10-CM

## 2025-01-10 DIAGNOSIS — M25.561 ACUTE PAIN OF RIGHT KNEE: Primary | ICD-10-CM

## 2025-01-10 PROCEDURE — 97530 THERAPEUTIC ACTIVITIES: CPT | Mod: GP | Performed by: PHYSICAL THERAPIST

## 2025-01-10 PROCEDURE — 97112 NEUROMUSCULAR REEDUCATION: CPT | Mod: GP | Performed by: PHYSICAL THERAPIST

## 2025-01-10 PROCEDURE — 97110 THERAPEUTIC EXERCISES: CPT | Mod: GP | Performed by: PHYSICAL THERAPIST

## 2025-01-10 NOTE — PROGRESS NOTES
"  Physical Therapy Treatment    Patient Name: Amara Cote  MRN: [id number]  Today's Date: 1/10/2025  Time Calculation  Start Time: 1247  Stop Time: 1333  Time Calculation (min): 46 min  PT Therapeutic Procedures Time Entry  Neuromuscular Re-Education Time Entry: 13  Therapeutic Exercise Time Entry: 21  Therapeutic Activity Time Entry: 11       Current Problem  Problem List Items Addressed This Visit             ICD-10-CM       Musculoskeletal and Injuries    Right knee pain - Primary M25.561       Insurance:  Number of Treatments Authorized: 4/Mn (DED 3200 80/20 COVERAGE V MED NEC REF#[id number])          Subjective   General  Reason for Referral: R knee pain s/p ORIF  Referred By: SNEHAL Cole  Pt reports she's been getting a little more pain now that she's moving more. Did ice on Wednesday. Doing all exercises without questions. Walking a few steps at home without crutch but not much.     Performing HEP?: Yes other than yesterday d/t busy schedule    Precautions  Precautions  Precautions Comment: Low fall risk (WBAT with brace donned)  Pain   0/10 R knee     Objective    brace donned with one crutch  Mild limp without crutch     Treatments: (brace donned for all weight bearing exercises)  Therapeutic Exercise  SciFit L2 [place] x 5 mins Seat 8-7  Gastroc stretch slantboard x 1 min   Heel raises slantboard x 1 min   R standing loaded knee ext, quad set 3-5\" +OP x 2 min  TGL7 B LE squats x 1 min   Long sit R/L gastroc stretch with strap 30\" x 2 each      NM re-ed:  Gait training with 1 crutch 40ft x 2 - cues for heel toe and pace  Amb without crutch 40' x 4 cues for equal steps   Dynamics with crutch + brace: high knees x2, butt kicks x2, x 40'   R/L lateral steps without crutch 40' each dir       TherActivity  6 inch R fwd step ups x 1 min   6 inch R lateral step ups x 1 min with minimal UQ support   4\" posterior toe taps for eccentric quads on R x 1 min   4\" lateral heel taps for eccentric quads on R x 1 min   Mini " squats x 1 min   Stairs negotiation education      OP EDUCATION:   1/10/25: gastroc stretch long sit with strap    Assessment:   Progressing well under POC. Improved confidence in ambulating without use of crutch. Slightly increased compensation, however may be d/t balance and confidence, coaxed for practicing at home with good understanding. Knee extension improving with overpressure. Challenged with eccentric quad exercise but able to perform with control.     Plan:  Continues to progress gait/decrease use of assistive device if nil/min deviations  Progress balance/function and  generalized LE strengthening as tolerated   OP PT Plan  Number of Treatments Authorized: 4/Mn (DED 3200 80/20 COVERAGE V MED NEC REF#[id number])      Marisa Palafox, PT

## 2025-01-14 ENCOUNTER — TREATMENT (OUTPATIENT)
Dept: PHYSICAL THERAPY | Facility: CLINIC | Age: 60
End: 2025-01-14
Payer: COMMERCIAL

## 2025-01-14 DIAGNOSIS — M25.561 RIGHT KNEE PAIN: ICD-10-CM

## 2025-01-14 PROCEDURE — 97110 THERAPEUTIC EXERCISES: CPT | Mod: GP

## 2025-01-14 NOTE — PROGRESS NOTES
"  Physical Therapy Treatment    Patient Name: Amara Cote  MRN: [id number]  Today's Date: 1/15/2025  Time Calculation  Start Time: 1456  Stop Time: 1545  Time Calculation (min): 49 min  PT Therapeutic Procedures Time Entry  Therapeutic Exercise Time Entry: 47       Current Problem  Problem List Items Addressed This Visit             ICD-10-CM    Right knee pain M25.561       Insurance:  Number of Treatments Authorized: 5/Mn (DED 3200 80/20 COVERAGE V MED NEC REF#[id number])          Subjective   General     Pt reports knee feeling a little more stiff and achy, has been sitting at computer since 8am this morning. Noticed a bit of swelling/soreness following last session, reports as tolerable and normal following sessions.       Performing HEP?: Yes other than yesterday d/t busy schedule    Precautions  Precautions  Precautions Comment: Low fall risk (WBAT with brace donned- cleared to wean from brace on 1/19/24 per PA)  Pain   0/10 R knee     Objective    brace donned with one crutch  Mild limp without crutch     Treatments: (brace donned for all weight bearing exercises)  Therapeutic Exercise  SciFit L1 man x 6 mins  R standing loaded knee ext, quad set 4\" +OP x 10 reps w/ 5 sec hold   Standing butt kick w/ 2lb ankle weight 2 sets x15  Single leg hip hinge w/ L foot on slide board x1 min   Single L leg step up 1\" w/ emphasis on slow control x1 min    TGL1 R LE squat 1 min x2 sets   R LE heel raise x15   R standing loaded knee ext x10 reps w/ 5 sec hold   Seated LAQ w/ 2lb ankle weight x1 min x2 sets   R knee semi loaded flexion x 1 min       OP EDUCATION:  1/14/2025: seated ROM stretch that can be done at work- knees to chest x10, seated hamstring stretch w/ overpressure x10      1/10/25: gastroc stretch long sit with strap    Assessment:   Pt is slowly progressing, continues to be self limiting at times with weight bearing. She was educated on importance of HEP compliance for ROM and encouraged to progress load " with knee flexion and ext stretches when able.  Tolerated the session well, felt good/looser leaving PT.     Plan:  Continues to progress gait/decrease use of assistive device if nil/min deviations  Progress balance/function and  generalized LE strengthening as tolerated   OP PT Plan  Number of Treatments Authorized: 5/Mn (DED 3200 80/20 COVERAGE V MED NEC REF#[id number])      Gabriele Long, PT

## 2025-01-17 ENCOUNTER — TREATMENT (OUTPATIENT)
Dept: PHYSICAL THERAPY | Facility: CLINIC | Age: 60
End: 2025-01-17
Payer: COMMERCIAL

## 2025-01-17 DIAGNOSIS — M25.561 RIGHT KNEE PAIN: ICD-10-CM

## 2025-01-17 PROCEDURE — 97110 THERAPEUTIC EXERCISES: CPT | Mod: GP

## 2025-01-17 PROCEDURE — 97112 NEUROMUSCULAR REEDUCATION: CPT | Mod: GP

## 2025-01-17 NOTE — PROGRESS NOTES
"  Physical Therapy Treatment    Patient Name: Amara Cote  MRN: [id number]  Today's Date: 1/17/2025  Time Calculation  Start Time: 1052  Stop Time: 1139  Time Calculation (min): 47 min  PT Therapeutic Procedures Time Entry  Neuromuscular Re-Education Time Entry: 14  Therapeutic Exercise Time Entry: 31       Current Problem  Problem List Items Addressed This Visit             ICD-10-CM    Right knee pain M25.561       Insurance:  Number of Treatments Authorized: 6/Mn (DED 3200 80/20 COVERAGE V MED NEC REF#[id number])          Subjective   General   Pt reports knee is feeling good with minimal pain, completing HEP for range of motion. Feels more confident in walking around and moving quicker compared to before. Able to start weaning off brace for household distances starting 1/20.       Performing HEP?: Yes     Precautions  Precautions  Precautions Comment: Low fall risk (WBAT with brace donned- cleared to wean from brace on 1/19/24 per PA)  Pain   0/10 R knee     Objective    brace donned with one crutch  Mild limp without crutch     Treatments: (brace donned for all weight bearing exercises)  Therapeutic Exercise  SciFit L1 man x 6 mins  Seated knee flex w/ OP x10   Seated knee ext w/ OP x1 min   Lateral step w/ green band x1 min x2   Heel raise on incline board x1 min   G/s stretch on incline board x1 min   Standing DF stretch x1 min   Standing knee ext w/ self OP 10 sec x5   Elevated R LE forward lean x10     Neuromuscular Re-ed   R LE sideways 6\" step up x10  R LE 6\" step up x1 min x2  R LE 2\" step down w/ emphasis on eccentric control x1 min x2   R LE step over w/ R SLS x1 min x2   L LE step over w/ emphasis R toe off x1 min x2       OP EDUCATION:  1/17/2025: Emphasized importance of toe off/heel strike throughout gait. Updated HEP   Exercises  - Forward and Backward Step Over with Beam (BKA)  - 5-6 x daily - 7 x weekly - 1 sets - 10-20 reps  - Standing Quad Set  - 5-6 x daily - 7 x weekly - 1 sets - 10-20 " reps  - Standing Gastroc Stretch on Foam 1/2 Roll  - 5-6 x daily - 7 x weekly - 1 sets - 10-20 reps  - Standing Ankle Dorsiflexion Stretch on Chair  - 5-6 x daily - 7 x weekly - 1 sets - 10-20 reps    1/14/2025: seated ROM stretch that can be done at work- knees to chest x10, seated hamstring stretch w/ overpressure x10      1/10/25: gastroc stretch long sit with strap    Assessment:   Pt continues to lack full knee ext which is contributing to gait deviations.  Had difficult with eccentric quad control during lateral step downs. Tolerates strengthening/balance exercises well, is not limited by pain.      Plan:  Waen from brace after 1/19/25 if strength is good   Continue to progress gait/decrease use of assistive device if nil/min deviations  Progress balance/function and  generalized LE strengthening as tolerated   OP PT Plan  Number of Treatments Authorized: 6/Mn (DED 3200 80/20 COVERAGE V MED NEC REF#[id number])      Gabriele Long, PT

## 2025-01-20 ENCOUNTER — TREATMENT (OUTPATIENT)
Dept: PHYSICAL THERAPY | Facility: CLINIC | Age: 60
End: 2025-01-20
Payer: COMMERCIAL

## 2025-01-20 DIAGNOSIS — M25.561 RIGHT KNEE PAIN: ICD-10-CM

## 2025-01-20 PROCEDURE — 97110 THERAPEUTIC EXERCISES: CPT | Mod: GP

## 2025-01-20 PROCEDURE — 97112 NEUROMUSCULAR REEDUCATION: CPT | Mod: GP

## 2025-01-20 NOTE — PROGRESS NOTES
"  Physical Therapy Treatment    Patient Name: Amara Cote  MRN: [id number]  Today's Date: 1/20/2025  Time Calculation  Start Time: 1338  Stop Time: 1425  Time Calculation (min): 47 min  PT Therapeutic Procedures Time Entry  Neuromuscular Re-Education Time Entry: 27  Therapeutic Exercise Time Entry: 20       Current Problem  Problem List Items Addressed This Visit             ICD-10-CM       Musculoskeletal and Injuries    Right knee pain M25.561         Insurance:  Number of Treatments Authorized: 7/Mn (DED 3200 80/20 COVERAGE V MED NEC REF#[id number])          Subjective   General  Pt reports knee is feeling good w/ minimal pain, completing HEP for ROM. Pt cleared to start weaning off brace for weight bearing activities. Noticing stiffness in mornings has improve over previous weeks.       Performing HEP?: Yes     Precautions  Precautions  Precautions Comment: Low fall risk (WBAT with brace donned- cleared to wean from brace on 1/19/24 per PA)  Pain   0/10 R knee     Objective    brace donned with one crutch  Mild limp without crutch     Treatments:   Therapeutic Exercise  SciFit position 9 man x 6 mins  Seated knee flex w/ OP x15   Seated knee ext w/ OP x1 min   Standing knee ext w/ OP x1 min   Lateral step w/ green band x1 min, standing kick x1 min x2 sets  L/R heel raise x1 min   Elevated R LE forward lean 5 sec hold x10     NM Re-ed   R LE sideways 6\" step up x1 min x2   R LE 6\" step up x1 min x2   R LE step over w/ R SLS x1 min   L LE step over w/ emphasis R toe off x1min   80' w/ crutch- verbal cues for proper weight distribution to R LE   160' w/ cane- verbal cues for sequencing/ proper weight distribution to R LE, demonstrates improved R toe push off/heel strike   160' no device- verbal cues for proper weight distribution to R LE, demonstrates asymmetrical weight distribution/limps    OP EDUCATION:  1/20/2025: Encouraged use of cane for gait to facilitate inc load bearing on R LE, educated on using crutch " "as \"walking stick\"     1/17/2025: Emphasized importance of toe off/heel strike throughout gait. Updated HEP   Exercises  - Forward and Backward Step Over with Beam (BKA)  - 5-6 x daily - 7 x weekly - 1 sets - 10-20 reps  - Standing Quad Set  - 5-6 x daily - 7 x weekly - 1 sets - 10-20 reps  - Standing Gastroc Stretch on Foam 1/2 Roll  - 5-6 x daily - 7 x weekly - 1 sets - 10-20 reps  - Standing Ankle Dorsiflexion Stretch on Chair  - 5-6 x daily - 7 x weekly - 1 sets - 10-20 reps    1/14/2025: seated ROM stretch that can be done at work- knees to chest x10, seated hamstring stretch w/ overpressure x10      1/10/25: gastroc stretch long sit with strap    Assessment:  Continues to lack full knee ext, contribution to gait deviations. Tolerates weightbearing activities w/o brace without sx exacerbation. Demonstrates improved weight distribution to R LE throughout gait, recommended to begin use of SPC.     Plan:  Progress ROM/restore terminal knee extension   Continue to progress gait/decrease use of assistive device if nil/min deviations  Progress balance/function and generalized LE strengthening as tolerated w/o use of brace   OP PT Plan  Number of Treatments Authorized: 7/Mn (DED 3200 80/20 COVERAGE V MED [facility] REF#[id number])      TRISTON KIRBY, S-PT  "

## 2025-01-21 ENCOUNTER — APPOINTMENT (OUTPATIENT)
Dept: PHYSICAL THERAPY | Facility: CLINIC | Age: 60
End: 2025-01-21
Payer: COMMERCIAL

## 2025-01-21 ENCOUNTER — APPOINTMENT (OUTPATIENT)
Dept: OBSTETRICS AND GYNECOLOGY | Facility: CLINIC | Age: 60
End: 2025-01-21
Payer: COMMERCIAL

## 2025-01-24 ENCOUNTER — TREATMENT (OUTPATIENT)
Dept: PHYSICAL THERAPY | Facility: CLINIC | Age: 60
End: 2025-01-24
Payer: COMMERCIAL

## 2025-01-24 DIAGNOSIS — M25.561 ACUTE PAIN OF RIGHT KNEE: Primary | ICD-10-CM

## 2025-01-24 DIAGNOSIS — M25.561 RIGHT KNEE PAIN: ICD-10-CM

## 2025-01-24 PROCEDURE — 97140 MANUAL THERAPY 1/> REGIONS: CPT | Mod: GP | Performed by: PHYSICAL THERAPIST

## 2025-01-24 PROCEDURE — 97110 THERAPEUTIC EXERCISES: CPT | Mod: GP | Performed by: PHYSICAL THERAPIST

## 2025-01-24 NOTE — PROGRESS NOTES
"  Physical Therapy Treatment    Patient Name: Amara Cote  MRN: [id number]  Today's Date: 1/24/2025  Time Calculation  Start Time: 1117  Stop Time: 1205  Time Calculation (min): 48 min  PT Therapeutic Procedures Time Entry  Manual Therapy Time Entry: 13  Therapeutic Exercise Time Entry: 33       Current Problem  Problem List Items Addressed This Visit             ICD-10-CM       Musculoskeletal and Injuries    Right knee pain - Primary M25.561           Insurance:  Number of Treatments Authorized: 8/MN (DED 3200 80/20 COVERAGE V MED NEC REF#[id number])          Subjective   General  Pt reports she's trying to walk better and work on knee extension. Wore brace a little on Tuesday and then not as much since then. Worried she won't get back to her normal.       Performing HEP?: Yes     Precautions  Precautions  Precautions Comment: Low fall risk (WBAT with brace donned- cleared to wean from brace on 1/19/24 per PA)  Pain   0/10 R knee     Objective    Without brace on arrival - crutch with minimal use  Lacking TKE in gait with device  0 degrees knee extension with overpressure     Treatments:   Therapeutic Exercise  SportsArt L2 x 6 minutes   Dynamics without crutch: high knees x2, butt kicks x2, tin soldiers x2, x 40' each   Seated knee flex w/ OP (foot on chair) x 15   Seated knee ext w/ OP x 2 min   R/L lateral steps with red tband at ankles x 40ft each direction  F/B diagonal steps with red tband at ankles x 40ft each direction  R TKE ball at wall 3\" x 2 min, repeat x 1 min   Updated HEP     Manual Therapy   R knee extension with OP   Tibial distraction with screw home mechanism for TKE   R quad release   R sup/inf patellar mobs grade III     OP EDUCATION:  1/24/24  Access Code: [email or web address]  - Side Stepping with Resistance at Ankles  - 1 x daily - 5 x weekly - 2 sets - 20 reps  - Forward Monster Walks  - 1 x daily - 5 x weekly - 2 sets - 20 reps  - Backward Monster Walks  - 1 x daily - 5 x weekly - 2 sets - 20 " "reps  - Standing Terminal Knee Extension at Wall with Ball  - 1 x daily - 7 x weekly - 2 sets - 10 reps - 3-5 hold    1/20/2025: Encouraged use of cane for gait to facilitate inc load bearing on R LE, educated on using crutch as \"walking stick\"     1/17/2025: Emphasized importance of toe off/heel strike throughout gait. Updated HEP   Exercises  - Forward and Backward Step Over with Beam (BKA)  - 5-6 x daily - 7 x weekly - 1 sets - 10-20 reps  - Standing Quad Set  - 5-6 x daily - 7 x weekly - 1 sets - 10-20 reps  - Standing Gastroc Stretch on Foam 1/2 Roll  - 5-6 x daily - 7 x weekly - 1 sets - 10-20 reps  - Standing Ankle Dorsiflexion Stretch on Chair  - 5-6 x daily - 7 x weekly - 1 sets - 10-20 reps    1/14/2025: seated ROM stretch that can be done at work- knees to chest x10, seated hamstring stretch w/ overpressure x10      1/10/25: gastroc stretch long sit with strap    Assessment:  Full knee extension gained with overpressure this date. Gait improving however continues to lack TKE. Similar gait with and without crutch - to go without device unless begins to limp or on uneven surfaces. Reduced superior patellar glides.     Plan:  Restore terminal knee extension   Continue to progress gait without assistive device  Progress balance/function and generalized LE strengthening as tolerated w/o use of brace   OP PT Plan  Number of Treatments Authorized: 8/MN (DED 3200 80/20 COVERAGE V MED [facility] [place] REF#[id number])      Marisa Palafox, PT  "

## 2025-01-27 ENCOUNTER — TREATMENT (OUTPATIENT)
Dept: PHYSICAL THERAPY | Facility: CLINIC | Age: 60
End: 2025-01-27
Payer: COMMERCIAL

## 2025-01-27 DIAGNOSIS — M25.561 RIGHT KNEE PAIN: ICD-10-CM

## 2025-01-27 DIAGNOSIS — M25.561 ACUTE PAIN OF RIGHT KNEE: Primary | ICD-10-CM

## 2025-01-27 PROCEDURE — 97110 THERAPEUTIC EXERCISES: CPT | Mod: GP | Performed by: PHYSICAL THERAPIST

## 2025-01-27 PROCEDURE — 97140 MANUAL THERAPY 1/> REGIONS: CPT | Mod: GP | Performed by: PHYSICAL THERAPIST

## 2025-01-27 NOTE — PROGRESS NOTES
"  Physical Therapy Treatment    Patient Name: Amara Cote  MRN: [id number]  Today's Date: 1/27/2025  Time Calculation  Start Time: 1646  Stop Time: 1731  Time Calculation (min): 45 min  PT Therapeutic Procedures Time Entry  Manual Therapy Time Entry: 9  Therapeutic Exercise Time Entry: 27  Therapeutic Activity Time Entry: 8       Current Problem  Problem List Items Addressed This Visit             ICD-10-CM       Musculoskeletal and Injuries    Right knee pain - Primary M25.561             Insurance:  Number of Treatments Authorized: 9/MN (DED 3200 80/20 COVERAGE V MED NEC REF#[id number])          Subjective   General  Pt reports she's tight today d/t sitting for work all day. Was able to stretch a lot over the weekend. Drove here today - brought crutch and brace just in case.     Performing HEP?: Yes     Precautions  Precautions  Precautions Comment: Low fall risk (WBAT with brace donned- cleared to wean from brace on 1/19/24 per PA)  Pain   0/10 R knee     Objective    Without brace or crutch throughout session  Lacking TKE in gait with device  0 degrees knee extension with overpressure   R SLS 24 sec     Treatments:   Therapeutic Exercise  SportsArt L2 x 6 minutes   Dynamics without crutch: high knees pulls, butt kicks, tin soldiers x2, x 40' each   Standing R knee extension on step with self OP x 2 min   Seated knee flex w/ OP (foot on 24\" box step x 2 min)  Seated knee ext w/ OP x 2 min   R/L split squats small range (1 UE support) x 1 min each   R/L lateral lunges weight shifting side to side x 1 min     Manual Therapy   R knee extension with OP   Tibial distraction with screw home mechanism for TKE   R quad release   R sup/inf patellar mobs grade III     NMR  R,L fwd bosu step ups with opp knee drive x 1 min each   R,L lateral bosu step ups x 1 min each   R,L SLS x 30 sec each     OP EDUCATION:  1/24/24: Access Code: [email or web address]  - Side Stepping with Resistance at Ankles  - 1 x daily - 5 x weekly - 2 sets - 20 " "reps  - Forward Monster Walks  - 1 x daily - 5 x weekly - 2 sets - 20 reps  - Backward Monster Walks  - 1 x daily - 5 x weekly - 2 sets - 20 reps  - Standing Terminal Knee Extension at Wall with Ball  - 1 x daily - 7 x weekly - 2 sets - 10 reps - 3-5 hold    1/20/2025: Encouraged use of cane for gait to facilitate inc load bearing on R LE, educated on using crutch as \"walking stick\"     1/17/2025: Emphasized importance of toe off/heel strike throughout gait. Updated HEP   Exercises  - Forward and Backward Step Over with Beam (BKA)  - 5-6 x daily - 7 x weekly - 1 sets - 10-20 reps  - Standing Quad Set  - 5-6 x daily - 7 x weekly - 1 sets - 10-20 reps  - Standing Gastroc Stretch on Foam 1/2 Roll  - 5-6 x daily - 7 x weekly - 1 sets - 10-20 reps  - Standing Ankle Dorsiflexion Stretch on Chair  - 5-6 x daily - 7 x weekly - 1 sets - 10-20 reps    1/14/2025: seated ROM stretch that can be done at work- knees to chest x10, seated hamstring stretch w/ overpressure x10      1/10/25: gastroc stretch long sit with strap    Assessment:  Pt with increased stiffness this date, however able to achieve full knee extension with OP. Coaxed for increased rep of overpressure stretching throughout the day with good understanding. Able to continue to progress with strength training this date without an increase in symptoms. Increased confidence throughout.     Plan:  Restore terminal knee extension in gait   Continue to progress gait without assistive device  Progress balance/function and generalized LE strengthening as tolerated w/o use of brace   *update next visit  OP PT Plan  Number of Treatments Authorized: 9/MN (DED 3200 80/20 COVERAGE V MED NEC REF#[id number])      Marisa Palafox, PT  "

## 2025-01-28 ENCOUNTER — APPOINTMENT (OUTPATIENT)
Dept: PHYSICAL THERAPY | Facility: CLINIC | Age: 60
End: 2025-01-28
Payer: COMMERCIAL

## 2025-01-31 ENCOUNTER — TREATMENT (OUTPATIENT)
Dept: PHYSICAL THERAPY | Facility: CLINIC | Age: 60
End: 2025-01-31
Payer: COMMERCIAL

## 2025-01-31 DIAGNOSIS — M25.561 ACUTE PAIN OF RIGHT KNEE: Primary | ICD-10-CM

## 2025-01-31 PROCEDURE — 97112 NEUROMUSCULAR REEDUCATION: CPT | Mod: GP

## 2025-01-31 PROCEDURE — 97110 THERAPEUTIC EXERCISES: CPT | Mod: GP

## 2025-01-31 NOTE — PROGRESS NOTES
"  Physical Therapy Treatment    Patient Name: Amara Cote  MRN: [id number]  Today's Date: 1/31/2025  Time Calculation  Start Time: 1447  Stop Time: 1538  Time Calculation (min): 51 min  PT Therapeutic Procedures Time Entry  Neuromuscular Re-Education Time Entry: 21  Therapeutic Exercise Time Entry: 28       Current Problem  Problem List Items Addressed This Visit    None          Insurance:  Number of Treatments Authorized: 10/MN (DED 3200 80/20 COVERAGE V MED NEC REF#[id number])          Subjective   General  Pt reports knee feels tight, no pain associated with it. Knee still stiff in mornings uses crutches at first, otherwise walking around w/o crutch, no pain w/ gait.     Performing HEP?: Yes     Precautions  Precautions  Precautions Comment: Low fall risk (WBAT with brace donned- cleared to wean from brace on 1/19/24 per PA)  Pain   1/10 R knee     Objective        Treatments:   Therapeutic Exercise  SportsArt L3 x6 min   Knee ext w/ self Op x1 min   Forward R 8 inch step up x1 min x2   Lateral R 8 inch step up x1 min x2   R heel raise x15 x2   Contract-relax knee ext 5 sec on-15 sec off x4     NM Re-ed   Semi tandem w/ emphasis on heel strike 120 ft  Backwards walking 120 ft  Heel walk 120 ft  Toe walk 120 ft   240 ft- verbal cues for knee flex following heel strike   Educated on importance of quad strength and effect on gait pattern x7 min       OP EDUCATION:  1/20/2025: Encouraged use of cane for gait to facilitate inc load bearing on R LE, educated on using crutch as \"walking stick\"     1/17/2025: Emphasized importance of toe off/heel strike throughout gait. Updated HEP   Exercises  - Forward and Backward Step Over with Beam (BKA)  - 5-6 x daily - 7 x weekly - 1 sets - 10-20 reps  - Standing Quad Set  - 5-6 x daily - 7 x weekly - 1 sets - 10-20 reps  - Standing Gastroc Stretch on Foam 1/2 Roll  - 5-6 x daily - 7 x weekly - 1 sets - 10-20 reps  - Standing Ankle Dorsiflexion Stretch on Chair  - 5-6 x daily - " 7 x weekly - 1 sets - 10-20 reps    1/14/2025: seated ROM stretch that can be done at work- knees to chest x10, seated hamstring stretch w/ overpressure x10      1/10/25: gastroc stretch long sit with strap    Assessment:  Demonstrates impaired TKE during gait in addition to dec knee flex, require verbal cues for normalizing pattern, still inconsistent. Tolerates progression in walking activities and strengthening exercises. Continue to progress eccentric quad activation through use of leg press/machines.     Plan:  Progress ROM/restore terminal knee extension   Continue to progress gait  Progress balance/function and generalized LE strengthening as tolerated   OP PT Plan  Number of Treatments Authorized: 10/MN (DED 3200 80/20 COVERAGE V MED NEC REF#[id number])    TRISTON KIRBY, S-PT

## 2025-02-03 ENCOUNTER — TREATMENT (OUTPATIENT)
Dept: PHYSICAL THERAPY | Facility: CLINIC | Age: 60
End: 2025-02-03
Payer: COMMERCIAL

## 2025-02-03 DIAGNOSIS — M25.561 ACUTE PAIN OF RIGHT KNEE: Primary | ICD-10-CM

## 2025-02-03 DIAGNOSIS — M25.561 RIGHT KNEE PAIN: ICD-10-CM

## 2025-02-03 PROCEDURE — 97110 THERAPEUTIC EXERCISES: CPT | Mod: GP | Performed by: PHYSICAL THERAPIST

## 2025-02-03 PROCEDURE — 97112 NEUROMUSCULAR REEDUCATION: CPT | Mod: GP | Performed by: PHYSICAL THERAPIST

## 2025-02-03 NOTE — PROGRESS NOTES
"  Physical Therapy Treatment    Patient Name: Amara Cote  MRN: [id number]  Today's Date: 2/3/2025  Time Calculation  Start Time: 1603  Stop Time: 1645  Time Calculation (min): 42 min  PT Therapeutic Procedures Time Entry  Neuromuscular Re-Education Time Entry: 14  Therapeutic Exercise Time Entry: 27       Current Problem  Problem List Items Addressed This Visit             ICD-10-CM       Musculoskeletal and Injuries    Right knee pain - Primary M25.561           Insurance:  Number of Treatments Authorized: 11/MN (DED 3200 80/20 COVERAGE V MED NEC REF#[id number])          Subjective   General  Pt reports knee continues to feel tight but doing well. Walking on the TM 15-18 minutes.     Performing HEP?: Yes     Precautions  Precautions  Precautions Comment: Low fall risk (WBAT with brace donned- cleared to wean from brace on 1/19/24 per PA)  Pain   1/10 R knee     Objective    Brought axillary crutch - did not use during session      Treatments:   Therapeutic Exercise  SportsArt L3 x 6 min   Dynamics: knee hugs, butt kicks, tin soldiers, mini fwd lunges x 40' each   R QS 3\" on 8\" step + mini SLR x 1 min   Gastroc stretch slantboard x 1 min   R,L SL heel raises x 1 min each   TGL7 B LE squats with quad set 3\" x 1 min   TGL7 R LE mini squat x 10   R,L Swiss splits squats on 8\" step x 7 reps each       Therapeutic Activity  R fwd 12\" step ups x 12 reps   R lateral 12\" step ups x 10 reps - UE assist  Squats x 1 min - cues for anterior tibial translation  Ambulating with heel toe pattern 40' x 4      OP EDUCATION:  2/3/25: squats     1/20/2025: Encouraged use of cane for gait to facilitate inc load bearing on R LE, educated on using crutch as \"walking stick\"     1/17/2025: Emphasized importance of toe off/heel strike throughout gait. Updated HEP   Exercises  - Forward and Backward Step Over with Beam (BKA)  - 5-6 x daily - 7 x weekly - 1 sets - 10-20 reps  - Standing Quad Set  - 5-6 x daily - 7 x weekly - 1 sets - " 10-20 reps  - Standing Gastroc Stretch on Foam 1/2 Roll  - 5-6 x daily - 7 x weekly - 1 sets - 10-20 reps  - Standing Ankle Dorsiflexion Stretch on Chair  - 5-6 x daily - 7 x weekly - 1 sets - 10-20 reps    1/14/2025: seated ROM stretch that can be done at work- knees to chest x10, seated hamstring stretch w/ overpressure x10      1/10/25: gastroc stretch long sit with strap    Assessment:  Pt showing good progress under POC. Coaxed for overemphasizing heel strike to work on TKE with good follow through. Challenged with progressive strengthening but performing without increased pain levels.     Plan:  Progress ROM/restore terminal knee extension   Continue to progress gait, squats/lunges  Progress balance/function and generalized LE strengthening as tolerated   OP PT Plan  Number of Treatments Authorized: 11/MN (DED 3200 80/20 COVERAGE V MED NEC REF#[id number])    Marisa Palafox, PT

## 2025-02-04 ENCOUNTER — APPOINTMENT (OUTPATIENT)
Dept: PHYSICAL THERAPY | Facility: CLINIC | Age: 60
End: 2025-02-04
Payer: COMMERCIAL

## 2025-02-07 ENCOUNTER — TREATMENT (OUTPATIENT)
Dept: PHYSICAL THERAPY | Facility: CLINIC | Age: 60
End: 2025-02-07
Payer: COMMERCIAL

## 2025-02-07 DIAGNOSIS — M25.561 RIGHT KNEE PAIN: ICD-10-CM

## 2025-02-07 DIAGNOSIS — M25.561 ACUTE PAIN OF RIGHT KNEE: Primary | ICD-10-CM

## 2025-02-07 PROCEDURE — 97530 THERAPEUTIC ACTIVITIES: CPT | Mod: GP | Performed by: PHYSICAL THERAPIST

## 2025-02-07 PROCEDURE — 97110 THERAPEUTIC EXERCISES: CPT | Mod: GP | Performed by: PHYSICAL THERAPIST

## 2025-02-07 NOTE — PROGRESS NOTES
"  Physical Therapy Treatment    Patient Name: Amara Cote  MRN: [id number]  Today's Date: 2/7/2025  Time Calculation  Start Time: 1121  Stop Time: 1205  Time Calculation (min): 44 min  PT Therapeutic Procedures Time Entry  Therapeutic Exercise Time Entry: 25  Therapeutic Activity Time Entry: 18       Current Problem  Problem List Items Addressed This Visit             ICD-10-CM       Musculoskeletal and Injuries    Right knee pain - Primary M25.561           Insurance:  Number of Treatments Authorized: 12/MN (DED 3200 80/20 COVERAGE V MED NEC REF#[id number])          Subjective   General  Pt reports knee is feeling really tight, hasn't been able to stretch yet today. Getting easier getting out of bed. Notes she's still worried about standing in the shower.     Performing HEP?: Yes     Precautions  Precautions  Precautions Comment: Low fall risk (WBAT with brace donned- cleared to wean from brace on 1/19/24 per PA)  Pain   1/10 R knee     Objective    Brought axillary crutch - did not use during session  Reduced anterior tib translation with squats - improving with cues    Treatments:   Therapeutic Exercise  SportsArt L2/3 x 6 min   Dynamics: knee hugs, butt kicks, tin soldiers, toe swipes x 40' each   R/L fwd walking lunges 40' x 2   R QS 3\" on 8\" step + overpressure x 2 min   Gastroc stretch slantboard x 1 min   R SL heel raises x 1 min each   HS curl machine 40# 1 min x 2     Therapeutic Activity  Squats x 1 min - R foam roller for R knee to tap for anterior tibial translation  L LE step downs 8\" for R eccentric control x 1 min - 1 UE support   R eccentric tap with heel 4\" x 1 min - difficult; toe tap 4\" x 1 min   R SL squat with UE support 1/4 depth x 1 min   Decline squats x 1 min   Ambulating with heel toe pattern 40' x 4      OP EDUCATION:  2/3/25: squats     1/20/2025: Encouraged use of cane for gait to facilitate inc load bearing on R LE, educated on using crutch as \"walking stick\"     1/17/2025: Emphasized " importance of toe off/heel strike throughout gait. Updated HEP   Exercises  - Forward and Backward Step Over with Beam (BKA)  - 5-6 x daily - 7 x weekly - 1 sets - 10-20 reps  - Standing Quad Set  - 5-6 x daily - 7 x weekly - 1 sets - 10-20 reps  - Standing Gastroc Stretch on Foam 1/2 Roll  - 5-6 x daily - 7 x weekly - 1 sets - 10-20 reps  - Standing Ankle Dorsiflexion Stretch on Chair  - 5-6 x daily - 7 x weekly - 1 sets - 10-20 reps    1/14/2025: seated ROM stretch that can be done at work- knees to chest x10, seated hamstring stretch w/ overpressure x10      1/10/25: gastroc stretch long sit with strap    Assessment:  Gait pattern improving this date with less compensations majority of the time. Challenged with progressive quad focused exercises, particularly with eccentric heel taps off step. Able to perform decline squats with equal WB without increased pain levels. Coaxed for continuing mobility exercises throughout the day with good understanding.     Plan:  Progress ROM/restore terminal knee extension   Continue to progress gait, squats/lunges  Progress balance/function and generalized LE strengthening as tolerated   OP PT Plan  Number of Treatments Authorized: 12/MN (DED 3200 80/20 COVERAGE V MED NEC REF#[id number])    Marisa Palafox, PT

## 2025-02-11 ENCOUNTER — TREATMENT (OUTPATIENT)
Dept: PHYSICAL THERAPY | Facility: CLINIC | Age: 60
End: 2025-02-11
Payer: COMMERCIAL

## 2025-02-11 DIAGNOSIS — M25.561 RIGHT KNEE PAIN: ICD-10-CM

## 2025-02-11 PROCEDURE — 97110 THERAPEUTIC EXERCISES: CPT | Mod: GP

## 2025-02-11 PROCEDURE — 97530 THERAPEUTIC ACTIVITIES: CPT | Mod: GP

## 2025-02-11 NOTE — PROGRESS NOTES
"  Physical Therapy Treatment    Patient Name: Amara Cote  MRN: [id number]  Today's Date: 2/11/2025  Time Calculation  Start Time: 1404  Stop Time: 1451  Time Calculation (min): 47 min  PT Therapeutic Procedures Time Entry  Therapeutic Exercise Time Entry: 37  Therapeutic Activity Time Entry: 9       Current Problem  Problem List Items Addressed This Visit             ICD-10-CM       Musculoskeletal and Injuries    Right knee pain M25.561           Insurance:  Number of Treatments Authorized: 13/MN (DED 3200 80/20 COVERAGE V MED NEC REF#[id number])          Subjective   General  Pt reports compliance w/ HEP, knee is feeling good. Continue to gain confidence while walking on treadmill, notices gait pattern continues to improve.     Performing HEP?: Yes     Precautions  Precautions  Precautions Comment: Low fall risk (WBAT with brace donned- cleared to wean from brace on 1/19/24 per PA)  Pain   1/10 R knee     Objective    Seated Knee Dynamometer (best score)  L knee flexion = 14.7 kg  R knee flexion = 10.1 kg (68.7%)  L knee ext = 22.6 kg  R knee ext = 15.1 kg (66.8%)    Treatments:   Therapeutic Exercise  SportsArt L3 x 6 min   Dynamics: knee hugs, butt kicks, fwd lunges, lat lunges, heel walk, toe walk 40 ft   Machine R hamstring curl x1 min x2   G/s stretch on incline board x1 min   R QS 5\" on 8\" step self OP x1 min x2     Therapeutic activity   R LE fwd step down 6\" x10 x2 - verbal cues for even pelvis   R LE lat step down 6\" x10 x2- verbal cues for even pelvis   Squats w/ band around R knee to facilitate anterior tibial translation x1 min x2       OP EDUCATION:  2/11/2025: Added to HEP   - Sideways Step Touch  - 1 x daily - 1-2 x weekly - 2-3 sets - 10-20 reps   - Squat  - 1 x daily - 1-2 x weekly - 2-3 sets - 10-20 reps   - Heel Raise on Step  - 1 x daily - 1-2 x weekly - 2-3 sets - 10-20 reps   - Prone Quad Stretch with Towel Roll and Strap  - 2 x daily - 7 x weekly - 2-3 sets - 10-20 reps   - Supine " "Quadriceps Stretch with Strap on Table  - 2 x daily - 7 x weekly - 2-3 sets - 10-20 reps     2/3/25: squats     1/20/2025: Encouraged use of cane for gait to facilitate inc load bearing on R LE, educated on using crutch as \"walking stick\"     1/17/2025: Emphasized importance of toe off/heel strike throughout gait. Updated HEP   Exercises  - Forward and Backward Step Over with Beam (BKA)  - 5-6 x daily - 7 x weekly - 1 sets - 10-20 reps  - Standing Quad Set  - 5-6 x daily - 7 x weekly - 1 sets - 10-20 reps  - Standing Gastroc Stretch on Foam 1/2 Roll  - 5-6 x daily - 7 x weekly - 1 sets - 10-20 reps  - Standing Ankle Dorsiflexion Stretch on Chair  - 5-6 x daily - 7 x weekly - 1 sets - 10-20 reps    1/14/2025: seated ROM stretch that can be done at work- knees to chest x10, seated hamstring stretch w/ overpressure x10      1/10/25: gastroc stretch long sit with strap    Assessment:  Demonstrates impaired R knee strength (see #'s above), especially w/ eccentric step downs. Mod cueing for preventing contralateral pelvis drop in addition to forward tibial translation. Continues to improve gait pattern w/ intact TKE 90% of time, has continued to progress time walking.    Plan:  Progress ROM/restore terminal knee extension   Continue to progress gait, squats/lunges  Progress balance/function and generalized LE strengthening as tolerated   OP PT Plan  Number of Treatments Authorized: 13/MN (DED 3200 80/20 COVERAGE V MED [facility] REF#[id number])    TRISTON KIRBY, S-PT  "

## 2025-02-13 ENCOUNTER — OFFICE VISIT (OUTPATIENT)
Dept: ORTHOPEDIC SURGERY | Facility: CLINIC | Age: 60
End: 2025-02-13
Payer: COMMERCIAL

## 2025-02-13 ENCOUNTER — HOSPITAL ENCOUNTER (OUTPATIENT)
Dept: RADIOLOGY | Facility: CLINIC | Age: 60
Discharge: HOME | End: 2025-02-13
Payer: COMMERCIAL

## 2025-02-13 DIAGNOSIS — S82.141A TIBIAL PLATEAU FRACTURE, RIGHT, CLOSED, INITIAL ENCOUNTER: ICD-10-CM

## 2025-02-13 PROCEDURE — 73560 X-RAY EXAM OF KNEE 1 OR 2: CPT | Mod: RT

## 2025-02-13 PROCEDURE — 99213 OFFICE O/P EST LOW 20 MIN: CPT | Performed by: ORTHOPAEDIC SURGERY

## 2025-02-13 PROCEDURE — 1036F TOBACCO NON-USER: CPT | Performed by: ORTHOPAEDIC SURGERY

## 2025-02-13 ASSESSMENT — PAIN - FUNCTIONAL ASSESSMENT: PAIN_FUNCTIONAL_ASSESSMENT: NO/DENIES PAIN

## 2025-02-13 NOTE — PROGRESS NOTES
Chief complaint I keep getting better with my right knee but still not 100% by far.    History 59-year-old female healthy who sustained a right tibial plateau fracture lateral along with a lateral meniscal tear and underwent repair on 9/27/2024.  She is full weightbearing not wearing a brace and undergoing active physical therapy.  She is not having much pain but the knee still feels weak.  She is starting to try to walk actively again but not going very far.  She used to walk between 4 and 5 miles a day before she was injured.  Walking several 100 yards but trying to improve weekly she is doing more strengthening exercises.  Physical therapy is does say that her right quad and hamstring are still weak but improving.    Her physical exam reveals a 59-year-old female who appears much younger than her stated age.  She has a BMI of 18..  Examining her right leg she still has some atrophy of the right quad and hamstring compared to the opposite side she has full knee extension and almost to 140 degrees of knee flexion.  She is slightly tender over the lateral joint line all incisions are well-healed she has no joint effusion her knee is stable anterior posteriorly medial laterally she has a negative Lachman she has negative drawer she has negative Deirdre's sign.    X-rays of the right knee show good maintenance of mechanical alignment.  The exact fracture is difficult to see but looks to be healed the lateral joint space has some very mild incongruity but has a very wide joint space and good mechanical axis.    Assessment 4-1/2-month status post ORIF of right tibial plateau with repair of peripheral tear lateral meniscus progressing well.    Plan will continue physical therapy I have encouraged adding a walking to her daily exercise routine as she enjoys that.  I want to see her back again in about 3 months with 1 last x-ray of the knee weightbearing.  She does have a risk in the future of developing significant  posttraumatic arthritis but currently she is doing well

## 2025-02-14 ENCOUNTER — TREATMENT (OUTPATIENT)
Dept: PHYSICAL THERAPY | Facility: CLINIC | Age: 60
End: 2025-02-14
Payer: COMMERCIAL

## 2025-02-14 DIAGNOSIS — M25.561 RIGHT KNEE PAIN: Primary | ICD-10-CM

## 2025-02-14 PROCEDURE — 97110 THERAPEUTIC EXERCISES: CPT | Mod: GP

## 2025-02-14 NOTE — PROGRESS NOTES
"  Physical Therapy Treatment    Patient Name: Amara Cote  MRN: [id number]  Today's Date: 2/14/2025  Time Calculation  Start Time: 1235  Stop Time: 1323  Time Calculation (min): 48 min  PT Therapeutic Procedures Time Entry  Therapeutic Exercise Time Entry: 43  Therapeutic Activity Time Entry: 4       Current Problem  Problem List Items Addressed This Visit             ICD-10-CM    Right knee pain - Primary M25.561           Insurance:  Number of Treatments Authorized: 14/MN (DED 3200 80/20 COVERAGE V MED [facility]EC REF#[id number])          Subjective   General  Pt reports partial compliance w/ HEP, knee is feeling good, normal feeling of stiffness. Met w/ surgeon yesterday, pleased w/ current results.     Performing HEP?: Partially     Precautions  Precautions  Precautions Comment: Low fall risk (WBAT with brace donned- cleared to wean from brace on 1/19/24 per PA)  Pain   1/10 R knee     Objective   R knee flex ROM: 140     Treatments:   Therapeutic Exercise  SportsArt L3 x 6 min   Dynamics: knee hugs, butt kicks, Walking hip ER, Walking hip IR, fwd lunges, lat lunges 40 ft   Machine R hamstring curl 30# x8 x2   Machine LAQ 10# Ascend -B , Descend - R only x2   G/s stretch on incline board x1 min   R quad set pushing ball into wall 10 sec hold, 5 sec off x10   B Leg Press 40# x10 x2     Therapeutic activity   R LE fwd step down 4\" x10 x2 - verbal cues for even pelvis   R LE lat step down 4\" x10 x2- verbal cues for even pelvis       OP EDUCATION:  2/11/2025: Added to HEP   - Sideways Step Touch  - 1 x daily - 1-2 x weekly - 2-3 sets - 10-20 reps   - Squat  - 1 x daily - 1-2 x weekly - 2-3 sets - 10-20 reps   - Heel Raise on Step  - 1 x daily - 1-2 x weekly - 2-3 sets - 10-20 reps   - Prone Quad Stretch with Towel Roll and Strap  - 2 x daily - 7 x weekly - 2-3 sets - 10-20 reps   - Supine Quadriceps Stretch with Strap on Table  - 2 x daily - 7 x weekly - 2-3 sets - 10-20 reps     2/3/25: squats     1/20/2025: Encouraged " "use of cane for gait to facilitate inc load bearing on R LE, educated on using crutch as \"walking stick\"     1/17/2025: Emphasized importance of toe off/heel strike throughout gait. Updated HEP   Exercises  - Forward and Backward Step Over with Beam (BKA)  - 5-6 x daily - 7 x weekly - 1 sets - 10-20 reps  - Standing Quad Set  - 5-6 x daily - 7 x weekly - 1 sets - 10-20 reps  - Standing Gastroc Stretch on Foam 1/2 Roll  - 5-6 x daily - 7 x weekly - 1 sets - 10-20 reps  - Standing Ankle Dorsiflexion Stretch on Chair  - 5-6 x daily - 7 x weekly - 1 sets - 10-20 reps    1/14/2025: seated ROM stretch that can be done at work- knees to chest x10, seated hamstring stretch w/ overpressure x10      1/10/25: gastroc stretch long sit with strap    Assessment:  Tolerates progression in R LE strengthening, Slight pain reported w/ LAQ during eccentric portion. Returns to baseline following, min verbal cues for form modification during exercises. Reports slight muscles soreness following session.     Plan:  Progress strength as tolerated   Progress knee flexion as tolerated   OP PT Plan  Number of Treatments Authorized: 14/MN (DED 3200 80/20 COVERAGE V MED [facility]EC REF#[id number])    Gabriele Long, PT  "

## 2025-02-18 ENCOUNTER — TREATMENT (OUTPATIENT)
Dept: PHYSICAL THERAPY | Facility: CLINIC | Age: 60
End: 2025-02-18
Payer: COMMERCIAL

## 2025-02-18 DIAGNOSIS — M25.561 RIGHT KNEE PAIN: ICD-10-CM

## 2025-02-18 PROCEDURE — 97110 THERAPEUTIC EXERCISES: CPT | Mod: GP

## 2025-02-18 NOTE — PROGRESS NOTES
"  Physical Therapy Treatment    Patient Name: Amara Cote  MRN: [id number]  Today's Date: 2/18/2025  Time Calculation  Start Time: 1359  Stop Time: 1443  Time Calculation (min): 44 min  PT Therapeutic Procedures Time Entry  Therapeutic Exercise Time Entry: 40  Therapeutic Activity Time Entry: 3       Current Problem  Problem List Items Addressed This Visit             ICD-10-CM    Right knee pain M25.561           Insurance:  Number of Treatments Authorized: 15/MN (DED 3200 80/20 COVERAGE V MED [facility]EC REF#[id number])          Subjective   General  Pt reports compliance w/ HEP, knee is feeling good, normal feeling of stiffness. Tolerated previous session well, no soreness following day.     Performing HEP?: Yes    Precautions  Precautions  Precautions Comment: Low fall risk (WBAT with brace donned- cleared to wean from brace on 1/19/24 per PA)  Pain   1/10 R knee     Objective     Treatments:   Therapeutic Exercise  SportsArt L3 x 6 min   Dynamics: knee hugs, butt kicks, Walking hip ER, Walking hip IR, fwd lunges, lat lunges 40 ft   Machine LAQ 10# Ascend -B , Descend - R only x10 x2   LAQ Iso at 60 deg knee flex 5 sec hold x8   Machine R hamstring curl 30# x8, 40# x8 x2   R split squat x10 x2   1a. R quad set pushing ball into wall 10 sec hold, 5 sec off x10   1b. R Heel raise x15     Therapeutic activity   R LE lat step down 6\" x15- verbal cues for even pelvis       OP EDUCATION:  2/11/2025: Added to HEP   - Sideways Step Touch  - 1 x daily - 1-2 x weekly - 2-3 sets - 10-20 reps   - Squat  - 1 x daily - 1-2 x weekly - 2-3 sets - 10-20 reps   - Heel Raise on Step  - 1 x daily - 1-2 x weekly - 2-3 sets - 10-20 reps   - Prone Quad Stretch with Towel Roll and Strap  - 2 x daily - 7 x weekly - 2-3 sets - 10-20 reps   - Supine Quadriceps Stretch with Strap on Table  - 2 x daily - 7 x weekly - 2-3 sets - 10-20 reps     2/3/25: squats     1/20/2025: Encouraged use of cane for gait to facilitate inc load bearing on R LE, " "educated on using crutch as \"walking stick\"     1/17/2025: Emphasized importance of toe off/heel strike throughout gait. Updated HEP   Exercises  - Forward and Backward Step Over with Beam (BKA)  - 5-6 x daily - 7 x weekly - 1 sets - 10-20 reps  - Standing Quad Set  - 5-6 x daily - 7 x weekly - 1 sets - 10-20 reps  - Standing Gastroc Stretch on Foam 1/2 Roll  - 5-6 x daily - 7 x weekly - 1 sets - 10-20 reps  - Standing Ankle Dorsiflexion Stretch on Chair  - 5-6 x daily - 7 x weekly - 1 sets - 10-20 reps    1/14/2025: seated ROM stretch that can be done at work- knees to chest x10, seated hamstring stretch w/ overpressure x10      1/10/25: gastroc stretch long sit with strap    Assessment:  Tolerates progression in R LE strengthening, fatigues quicker when compared to L. Min verbal cues for timed eccentric. Overall tolerated session well, reported no pain following session.     Plan:  Progress strength as tolerated   OP PT Plan  Number of Treatments Authorized: 15/MN (DED 3200 80/20 COVERAGE V MED NEC REF#[id number])    Gabriele Long, PT  "

## 2025-02-19 ENCOUNTER — OFFICE VISIT (OUTPATIENT)
Facility: CLINIC | Age: 60
End: 2025-02-19
Payer: COMMERCIAL

## 2025-02-19 ENCOUNTER — OFFICE VISIT (OUTPATIENT)
Dept: ORTHOPEDIC SURGERY | Facility: CLINIC | Age: 60
End: 2025-02-19
Payer: COMMERCIAL

## 2025-02-19 DIAGNOSIS — Z23 NEED FOR TETANUS BOOSTER: Primary | ICD-10-CM

## 2025-02-19 DIAGNOSIS — S82.121D CLOSED FRACTURE OF LATERAL PORTION OF RIGHT TIBIAL PLATEAU WITH ROUTINE HEALING, SUBSEQUENT ENCOUNTER: Primary | ICD-10-CM

## 2025-02-19 PROCEDURE — 90715 TDAP VACCINE 7 YRS/> IM: CPT | Performed by: INTERNAL MEDICINE

## 2025-02-19 PROCEDURE — 99214 OFFICE O/P EST MOD 30 MIN: CPT | Performed by: ORTHOPAEDIC SURGERY

## 2025-02-19 PROCEDURE — 90471 IMMUNIZATION ADMIN: CPT | Performed by: INTERNAL MEDICINE

## 2025-02-19 PROCEDURE — 1036F TOBACCO NON-USER: CPT | Performed by: INTERNAL MEDICINE

## 2025-02-21 ENCOUNTER — TREATMENT (OUTPATIENT)
Dept: PHYSICAL THERAPY | Facility: CLINIC | Age: 60
End: 2025-02-21
Payer: COMMERCIAL

## 2025-02-21 DIAGNOSIS — M25.561 RIGHT KNEE PAIN: ICD-10-CM

## 2025-02-21 DIAGNOSIS — M25.561 ACUTE PAIN OF RIGHT KNEE: Primary | ICD-10-CM

## 2025-02-21 PROCEDURE — 97110 THERAPEUTIC EXERCISES: CPT | Mod: GP | Performed by: PHYSICAL THERAPIST

## 2025-02-21 PROCEDURE — 97112 NEUROMUSCULAR REEDUCATION: CPT | Mod: GP | Performed by: PHYSICAL THERAPIST

## 2025-02-21 NOTE — PROGRESS NOTES
Physical Therapy Treatment    Patient Name: Amara Cote  MRN: [id number]  Today's Date: 2/21/2025  Time Calculation  Start Time: 1348  Stop Time: 1432  Time Calculation (min): 44 min  PT Therapeutic Procedures Time Entry  Neuromuscular Re-Education Time Entry: 11  Therapeutic Exercise Time Entry: 32       Current Problem  Problem List Items Addressed This Visit             ICD-10-CM       Musculoskeletal and Injuries    Right knee pain - Primary M25.561     Insurance:  Number of Treatments Authorized: 16/MN (DED 3200 80/20 COVERAGE V MED [facility] REF#[id number])          Subjective   General  Pt reports she's doing well - focusing on walking correctly. Notes she felt a pop this morning while putting on socks but no change in pain. Walking ~20 min on TM.      Performing HEP?: Yes    Precautions  Precautions  Precautions Comment: Low fall risk (WBAT with brace donned- cleared to wean from brace on 1/19/24 per PA)  Pain   1/10 R knee     Objective     Treatments:   Therapeutic Exercise  SportsArt L3 x 6 min   Dynamics: knee hugs, butt kicks, tin soldiers, toe swipes, x 40 ft   R/L walking lunges 40' x 2   Gastroc stretch slantboard x 1 min   R SL heel raises x 1 min   Sit<>stand without UE support with staggered R behind L to increase R LE x 1 min   Mod R SL squat to chair (L LE in front on ground) x 1 min     NMR  TRX R,L slider lateral lunges x 45 sec each   TRX R,L slider posterolateral lunges x 1 min each   TRX R,L slider posterior lunges x 45 sec each  TRX R SL mini squat x 1 min       OP EDUCATION:  2/21/25: R modified SL squat to chair/sit<>stand     2/11/2025: Added to HEP   - Sideways Step Touch  - 1 x daily - 1-2 x weekly - 2-3 sets - 10-20 reps   - Squat  - 1 x daily - 1-2 x weekly - 2-3 sets - 10-20 reps   - Heel Raise on Step  - 1 x daily - 1-2 x weekly - 2-3 sets - 10-20 reps   - Prone Quad Stretch with Towel Roll and Strap  - 2 x daily - 7 x weekly - 2-3 sets - 10-20 reps   - Supine Quadriceps Stretch with  Strap on Table  - 2 x daily - 7 x weekly - 2-3 sets - 10-20 reps     2/3/25: squats     Assessment:  Pt showing good tolerance to continued quad load in exercise regimen. Challenged with sliders with R LE WB. Fatiguing with R modified SL squats, improving with reps and confidence.     Plan:  Progress strength as tolerated; eccentric quad exercises   OP PT Plan  Number of Treatments Authorized: 16/MN (DED 3200 80/20 COVERAGE V MED [facility] REF#[id number])    Marisa Palafox, PT

## 2025-02-21 NOTE — PROGRESS NOTES
This is a 59-year-old female who presents for an evaluation of right knee injury today.  She was involved in a fall when she slipped on a rubber mat in the [employer] restaurant on 9/17/2024.  She sustained a right tibial plateau fracture due to the fall.  She was treated at [facility] by Dr. Shaw Cole with open reduction and internal fixation on 9/27/2024.  She was treated via a lateral surgical approach with plate and screw type internal fixation after articular reduction.  She had significant depression of her articular surface and impaction initially.  She also had an associated right lateral meniscus tear that was repaired during her surgery.  She has been rehabilitating since her surgery and was initially nonweightbearing followed by 50% weightbearing and is now weightbearing as tolerated.  She is doing physical therapy ever since her injury.  She has worked hard to regain strength and range of motion.  She is currently not using a brace but was initially.  Although she is improved significantly since the acute trauma, she still has 2 to 3 days/week where she will experience a sudden pains in her knee.  She still unable to walk significant distance and is fearful of slipping and falling.  Her job was affected by her injury as she was not able to attend important meetings and significant events while she was nonweightbearing and recovering.  She exercised avidly prior to the fall and used to walk or hike for 2 hours a day or more.  She is now just barely up to 20 minutes on a treadmill walking.  As her recovery occurred throughout the holiday season she also had to miss significant family events due to her injury.  She did have in-home therapy through December and then converted to outpatient therapy with appointments scheduled out through next month.  She had to have out-of-town family and friends come and help assist in her care due to her limitations from the injury.  Her immobility  following surgery also rendered her unable to visit her aging mother.  Her lifestyle was severely interrupted by this injury.  She has not been able to return to her previous level of activity.    She was alert and oriented to person, place, and time on physical examination.  Her mood and affect are appropriate but also tearful and sad and discussing the incident.  Her sclerae were white and pupils were round.  Her mucous membranes were moist.  Her respirations were unlabored and her chest rise was symmetric.  Her cardiac rate and rhythm were regular and normal, respectively.  Her abdomen was nondistended.  She had no systemic cutaneous lesions noted.      Musculoskeletal examination of her right knee demonstrated range of motion from 0 to 140 degrees of flexion.  Her knee was stable to coronal and sagittal plane stressing at 0 and 30 degrees of flexion.  She had no pain to palpation of the medial joint line and mild tenderness to palpation at the lateral joint line.  She had a 15 to 20 cm curvilinear well-healed scar on the lateral aspect of her knee.  There was palpable subcutaneous fullness from the hardware noted over the anterolateral aspect of her tibia compared to her contralateral side.  There was some mild crepitus to passive range of motion on the right and none on the left.  Sensation is intact to light touch in the tibial, sural, saphenous, superficial peroneal, and deep peroneal nerve distributions. Foot is warm and well-perfused.    Multiple x-ray views and imaging studies were reviewed from the time of injury through 2/13/2025.  Initially she had a lateral tibial plateau fracture with articular depression.  Current x-rays demonstrate internal fixation with a laterally based plate and several screws.  Her articular reduction is significantly improved.  She does still have some articular irregularity in the lateral tibial plateau.  Her CT scan from the time of injury does demonstrate some mild  pre-existing arthritic changes particularly to the lateral femoral condyle.    Ms. Cote is a 59-year-old female 5 months out from a right lateral tibial plateau fracture with articular depression and right lateral meniscus tear treated with open reduction and internal fixation of her fracture and repair of her meniscus injury.  This occurred after a fall in a restaurant.  She is still improving at this point.  She is making good strides, but she still has a long way to go to get back to her preinjury level.  Unfortunately for her, she did have some mild pre-existing arthritis.  Although that was asymptomatic prior to the injury, now with an intra-articular injury she is at a lifetime risk for developing symptomatic posttraumatic arthritis.  She has several permanent and substantial physical deformities in the form of her right lateral knee scar, palpable subcutaneous permanently indwelling hardware, and some articular irregularity at the lateral tibial plateau.  I would give her up to 1 year to fully recover in terms of her function and improve her walking and exercise endurance and strength.  There will be some things, however, that will not improve with time such as the presence of her internal fixation and the fullness and prominence caused by it.  In terms of future care, I would estimate she has a 25 to 30% chance of requiring symptomatic hardware removal.  This would need to be done through an additional surgery.  Her scar is also permanent and likely unchanged both barring the participation of costly laser treatment therapy through plastic surgery.  I would be happy to reevaluate her at the 1 year james for permanency of her functional disabilities.

## 2025-02-25 ENCOUNTER — TREATMENT (OUTPATIENT)
Dept: PHYSICAL THERAPY | Facility: CLINIC | Age: 60
End: 2025-02-25
Payer: COMMERCIAL

## 2025-02-25 DIAGNOSIS — M25.561 RIGHT KNEE PAIN: Primary | ICD-10-CM

## 2025-02-25 PROCEDURE — 97110 THERAPEUTIC EXERCISES: CPT | Mod: GP

## 2025-02-25 PROCEDURE — 97112 NEUROMUSCULAR REEDUCATION: CPT | Mod: GP

## 2025-02-25 NOTE — PROGRESS NOTES
Physical Therapy Treatment    Patient Name: Amara Cote  MRN: [id number]  Today's Date: 2/25/2025  Time Calculation  Start Time: 1401  Stop Time: 1449  Time Calculation (min): 48 min  PT Therapeutic Procedures Time Entry  Neuromuscular Re-Education Time Entry: 8  Therapeutic Exercise Time Entry: 39       Current Problem  Problem List Items Addressed This Visit             ICD-10-CM    Right knee pain - Primary M25.561     Insurance:  Number of Treatments Authorized: 17/MN (DED 3200 80/20 COVERAGE V MED NEC REF#[id number])          Subjective   General  Pt reports knee is feeling good. Went walking at mall over weekend, still feeling sore and stiffness but is returning normal.     Performing HEP?: Yes    Precautions  Precautions  Precautions Comment: Low fall risk (WBAT with brace donned- cleared to wean from brace on 1/19/24 per PA)  Pain   2/10 R knee     Objective     Treatments:   Therapeutic Exercise  SportsArt L3 x 6 min   Dynamics: knee hugs, butt kicks, hip openers, hip closers, toe swipes, x 40 ft   R/L walking lunges 40' x 2   Resisted bwd/fwd walk 20# x1 min x2   Machine HS curl 20# x6- 35# x8 x2   G/s stretch slantboard x1 min   R SL heel raises x 1 min     NM Re-ed  TRX R,L slider posterolateral lunges x 10 each   TRX R,L slider posterior lunges x10 each   TRX R SL mini squat x10        OP EDUCATION:  2/21/25: R modified SL squat to chair/sit<>stand     2/11/2025: Added to HEP   - Sideways Step Touch  - 1 x daily - 1-2 x weekly - 2-3 sets - 10-20 reps   - Squat  - 1 x daily - 1-2 x weekly - 2-3 sets - 10-20 reps   - Heel Raise on Step  - 1 x daily - 1-2 x weekly - 2-3 sets - 10-20 reps   - Prone Quad Stretch with Towel Roll and Strap  - 2 x daily - 7 x weekly - 2-3 sets - 10-20 reps   - Supine Quadriceps Stretch with Strap on Table  - 2 x daily - 7 x weekly - 2-3 sets - 10-20 reps     2/3/25: squats     Assessment:  Session focused on L LE strengthening, fatigues throughout exercises, demonstrating  improved L eccentric quad control. Reported no pain following session.     Plan:  Progress strength as tolerated; eccentric quad exercises   OP PT Plan  Number of Treatments Authorized: 17/MN (DED 8350 80/20 COVERAGE V MED [facility] REF#[id number])    Gabriele Long, PT

## 2025-02-28 ENCOUNTER — APPOINTMENT (OUTPATIENT)
Dept: PHYSICAL THERAPY | Facility: CLINIC | Age: 60
End: 2025-02-28
Payer: COMMERCIAL

## 2025-03-03 ENCOUNTER — TREATMENT (OUTPATIENT)
Dept: PHYSICAL THERAPY | Facility: CLINIC | Age: 60
End: 2025-03-03
Payer: COMMERCIAL

## 2025-03-03 DIAGNOSIS — M25.561 RIGHT KNEE PAIN: ICD-10-CM

## 2025-03-03 DIAGNOSIS — M25.561 ACUTE PAIN OF RIGHT KNEE: Primary | ICD-10-CM

## 2025-03-03 PROCEDURE — 97110 THERAPEUTIC EXERCISES: CPT | Mod: GP | Performed by: PHYSICAL THERAPIST

## 2025-03-03 PROCEDURE — 97112 NEUROMUSCULAR REEDUCATION: CPT | Mod: GP | Performed by: PHYSICAL THERAPIST

## 2025-03-03 NOTE — PROGRESS NOTES
"  Physical Therapy Treatment    Patient Name: Amara Cote  MRN: [id number]  Today's Date: 3/3/2025  Time Calculation  Start Time: 1648  Stop Time: 1734  Time Calculation (min): 46 min  PT Therapeutic Procedures Time Entry  Neuromuscular Re-Education Time Entry: 12  Therapeutic Exercise Time Entry: 32       Current Problem  Problem List Items Addressed This Visit             ICD-10-CM       Musculoskeletal and Injuries    Right knee pain - Primary M25.561       Insurance:  Number of Treatments Authorized: 18/MN (DED 3200 80/20 COVERAGE V MED [facility] REF#[id number])          Subjective   General  Pt reports knee is feeling pretty good but still very stiff, especially in the morning. Walked ~40 minutes, took a 10-15 min break with stretching and then walked another 30 min. Was hurting and sore that night. Notes still some difficulty going down stairs, and cannot carry anything.    Performing HEP?: Yes    Precautions  Precautions  Precautions Comment: Low fall risk (WBAT with brace donned- cleared to wean from brace on 1/19/24 per PA)  Pain   2/10 R knee     Objective   Ambulating with minimal gait deficit   R SLS 3 sec     Treatments:   Therapeutic Exercise  SciFit Multipeaks L3 x 6 min   Dynamics: knee hugs, butt kicks, tin soldiers, toe swipes, x 40 ft   R,L SL heel raises x 1 min each   R/L split squats x 1 min, L/R split squats (UE support) x 1 min   Gastroc stretch slantboard x 1 min   R/L Cypriot split squats x 1 min; L/R Cypriot split squats x 1 min   R,L lateral lunges x 1 min each   R,L modified fwd T with reach to rail x 1 min each     NM Re-ed  R,L SLS 30 sec x 2 each   B RDLs x 1 min   R,L staggered stance SL RDLs x 1 min each - increased time for technique demo  R QS on 8\" step with small range SLR x 1 min - cues to reduce ext lag    OP EDUCATION:  2/21/25: R modified SL squat to chair/sit<>stand     2/11/2025: Added to HEP   - Sideways Step Touch  - 1 x daily - 1-2 x weekly - 2-3 sets - 10-20 reps   - " Squat  - 1 x daily - 1-2 x weekly - 2-3 sets - 10-20 reps   - Heel Raise on Step  - 1 x daily - 1-2 x weekly - 2-3 sets - 10-20 reps   - Prone Quad Stretch with Towel Roll and Strap  - 2 x daily - 7 x weekly - 2-3 sets - 10-20 reps   - Supine Quadriceps Stretch with Strap on Table  - 2 x daily - 7 x weekly - 2-3 sets - 10-20 reps     2/3/25: squats     Assessment:  Pt overall showing good progress under POC. Challenged with balance exercises. Able to add RDLs and [place] split squats without an increase in pain, but with added fatigue. Will continue to benefit from strength training.     Plan:  Progress strength as tolerated; eccentric quad exercises   OP PT Plan  Number of Treatments Authorized: 18/MN (DED 3200 80/20 COVERAGE V MED [facility] REF#[id number])    Marisa Palafox, PT

## 2025-03-04 ENCOUNTER — APPOINTMENT (OUTPATIENT)
Facility: CLINIC | Age: 60
End: 2025-03-04
Payer: COMMERCIAL

## 2025-03-05 ENCOUNTER — APPOINTMENT (OUTPATIENT)
Dept: PHYSICAL THERAPY | Facility: CLINIC | Age: 60
End: 2025-03-05
Payer: COMMERCIAL

## 2025-03-05 ENCOUNTER — OFFICE VISIT (OUTPATIENT)
Facility: CLINIC | Age: 60
End: 2025-03-05
Payer: COMMERCIAL

## 2025-03-05 DIAGNOSIS — S16.1XXA STRAIN OF NECK MUSCLE, INITIAL ENCOUNTER: Primary | ICD-10-CM

## 2025-03-05 PROCEDURE — 99213 OFFICE O/P EST LOW 20 MIN: CPT | Performed by: INTERNAL MEDICINE

## 2025-03-05 RX ORDER — METHYLPREDNISOLONE 4 MG/1
TABLET ORAL
Qty: 21 TABLET | Refills: 0 | Status: SHIPPED | OUTPATIENT
Start: 2025-03-05

## 2025-03-05 RX ORDER — CYCLOBENZAPRINE HCL 5 MG
5 TABLET ORAL NIGHTLY PRN
Qty: 10 TABLET | Refills: 0 | Status: SHIPPED | OUTPATIENT
Start: 2025-03-05 | End: 2025-03-15

## 2025-03-05 NOTE — PROGRESS NOTES
Subjective   Patient ID: Amara Cote is a 59 y.o. female who presents for Neck pain x 1 day    HPI     Neck strain/pain x 1 day    No radiation to the upper extremities    No weakness      Review of Systems    + Neck stiffness      Constitutional: Negative for activity change, appetite change, chills, fatigue and fever.   HENT: Negative for congestion, ear pain, rhinorrhea, sinus pain and sore throat.   Eyes: Negative for pain, discharge and redness.   Respiratory: Negative for cough, shortness of breath and wheezing.   Cardiovascular: Negative for chest pain.   Gastrointestinal: Negative for abdominal pain.   Skin: Negative for rash.       Objective   LMP  (LMP Unknown)     Physical Exam    GENERAL: alert, cooperative, pleasant, in no acute distress  SKIN: warm, dry, no rash.  NECK: no JVD, no WIL  CARDIAC: Regular rate and rhythm with no rubs, murmurs, or gallops  CHEST: Normal respiratory efforts, lungs clear to auscultation bilaterally.  ABDOMEN: soft, nontender, nondistended  EXTREMITIES: no edema  NEURO: Alert and oriented x 3. Grossly normal. Moves all 4 extremities.     MSK:    Neck Tenderness with flexion and lateral rotation to the Right    Tenderness on palpation    No neck stiffness      Assessment/Plan        Neck pain x 1 day    No evidence of cervical neuritis    DDX:    Strain vs Spasm    Plan:    Flexeril 5 mg at bedtime x 14 days    Medrol dose Pk    ? PT/MT     Call/Text if symptoms persist or worsen

## 2025-03-07 ENCOUNTER — TREATMENT (OUTPATIENT)
Dept: PHYSICAL THERAPY | Facility: CLINIC | Age: 60
End: 2025-03-07
Payer: COMMERCIAL

## 2025-03-07 DIAGNOSIS — M25.561 RIGHT KNEE PAIN: ICD-10-CM

## 2025-03-07 PROCEDURE — 97112 NEUROMUSCULAR REEDUCATION: CPT | Mod: GP

## 2025-03-07 PROCEDURE — 97110 THERAPEUTIC EXERCISES: CPT | Mod: GP

## 2025-03-07 NOTE — PROGRESS NOTES
Physical Therapy Treatment    Patient Name: Amara Cote  MRN: [id number]  Today's Date: 3/7/2025  Time Calculation  Start Time: 1012  Stop Time: 1100  Time Calculation (min): 48 min  PT Therapeutic Procedures Time Entry  Neuromuscular Re-Education Time Entry: 17  Therapeutic Exercise Time Entry: 30       Current Problem  Problem List Items Addressed This Visit             ICD-10-CM    Right knee pain M25.561       Insurance:  Number of Treatments Authorized: 19/MN (DED 3200 80/20 COVERAGE V MED [facility] REF#[id number])          Subjective   General  Pt reports compliance w/ HEP. Knee has been feeling good, still having stiffness in morning. Noticing it loosens up quicker each day.       Performing HEP?: Yes    Precautions  Precautions  Precautions Comment: Low fall risk (WBAT with brace donned- cleared to wean from brace on 1/19/24 per PA)  Pain   0/10 R knee     Objective     Treatments:   Therapeutic Exercise  SportsArt L3 x 6 min   Dynamics: knee hugs, butt kicks, toe swipes, fwd/lat walk(Y TB), bwd/lat walk (Y TB), L/R lat walk (Y TB)x 40 ft   Bwd/fwd walk 20# x1 min x2   R,L heel raise 10# KB x15 each x2  Machine R LAQ        NM Re-ed  TRX:  BW squat- heel on airex pad x10   R SLS- heel elevated off 10# plate x10 x2   BW squat- heel on airex pad x15 x2   R/L staggered SL RDL 2 10# KB, x10 each       OP EDUCATION:  2/21/25: R modified SL squat to chair/sit<>stand     2/11/2025: Added to HEP   - Sideways Step Touch  - 1 x daily - 1-2 x weekly - 2-3 sets - 10-20 reps   - Squat  - 1 x daily - 1-2 x weekly - 2-3 sets - 10-20 reps   - Heel Raise on Step  - 1 x daily - 1-2 x weekly - 2-3 sets - 10-20 reps   - Prone Quad Stretch with Towel Roll and Strap  - 2 x daily - 7 x weekly - 2-3 sets - 10-20 reps   - Supine Quadriceps Stretch with Strap on Table  - 2 x daily - 7 x weekly - 2-3 sets - 10-20 reps     2/3/25: squats     Assessment:  Pt continues to demonstrate improvements in R knee ROM and quad strength. Tolerates  heel elevated squats without increase in pain, minimal fatigue during single leg exercises. Overall, tolerated session well reported no pain following session.       Plan:  Progress strength as tolerated; eccentric quad exercises   OP PT Plan  Number of Treatments Authorized: 19/MN (DED 3200 80/20 COVERAGE V MED [facility] REF#[id number])    Gabriele Long, PT

## 2025-03-10 ENCOUNTER — TREATMENT (OUTPATIENT)
Dept: PHYSICAL THERAPY | Facility: CLINIC | Age: 60
End: 2025-03-10
Payer: COMMERCIAL

## 2025-03-10 DIAGNOSIS — M25.561 RIGHT KNEE PAIN: ICD-10-CM

## 2025-03-10 DIAGNOSIS — M25.561 ACUTE PAIN OF RIGHT KNEE: Primary | ICD-10-CM

## 2025-03-10 PROCEDURE — 97530 THERAPEUTIC ACTIVITIES: CPT | Mod: GP | Performed by: PHYSICAL THERAPIST

## 2025-03-10 PROCEDURE — 97110 THERAPEUTIC EXERCISES: CPT | Mod: GP | Performed by: PHYSICAL THERAPIST

## 2025-03-10 PROCEDURE — 97112 NEUROMUSCULAR REEDUCATION: CPT | Mod: GP | Performed by: PHYSICAL THERAPIST

## 2025-03-10 NOTE — PROGRESS NOTES
"  Physical Therapy Treatment    Patient Name: Amara Cote  MRN: [id number]  Today's Date: 3/10/2025  Time Calculation  Start Time: 1646  Stop Time: 1731  Time Calculation (min): 45 min  PT Therapeutic Procedures Time Entry  Neuromuscular Re-Education Time Entry: 9  Therapeutic Exercise Time Entry: 15  Therapeutic Activity Time Entry: 20       Current Problem  Problem List Items Addressed This Visit             ICD-10-CM       Musculoskeletal and Injuries    Right knee pain - Primary M25.561       Insurance:  Number of Treatments Authorized: 20/MN (DED 3200 80/20 COVERAGE V MED [facility] REF#[id number])          Subjective   General  Notes she's doing well. Stiffness in AM - reducing after about 3-5 minutes. Still some difficulty with walking down stairs, especially while holding something.     Performing HEP?: Yes    Precautions  Precautions  Precautions Comment: Low fall risk (WBAT with brace donned- cleared to wean from brace on 1/19/24 per PA)  Pain   0/10 R knee     Objective   R knee full extension  R SLS 18 sec     Treatments:   Therapeutic Exercise  SportsArt L3 x 6 min   Dynamics: knee hugs, butt kicks, toe swipes, tin soldiers x 40 ft   R QS on 8\" step with mini SLR x 1 min   R,L SL heel raises on slantboard x 1 min each     Therapeutic Activity  R/L split squats with 1 HR x 1 min   L/R split squats with 1 HR x 1 min   Squats x 1 min   Decline squats slantboard x 1 min   L LE 6\" step down 10# KB x 1 min   R LE step ups 6\" with 10# KB x 1 min   R lateral 6\" step ups x 1 min   R>L sit<>stand from std chair - demo - UE support for min assist x 5 reps     NM Re-ed  R,L SLS x 30 sec each   R/L alt march airex x 1 min   R,L airex SLS x 30 sec each   R,L SLS with anterior reach to rail x 8 reps each    OP EDUCATION:  *reviewed - 2/21/25: R modified SL squat to chair/sit<>stand     2/11/2025: Added to HEP   - Sideways Step Touch  - 1 x daily - 1-2 x weekly - 2-3 sets - 10-20 reps   - Squat  - 1 x daily - 1-2 x weekly - " 2-3 sets - 10-20 reps   - Heel Raise on Step  - 1 x daily - 1-2 x weekly - 2-3 sets - 10-20 reps   - Prone Quad Stretch with Towel Roll and Strap  - 2 x daily - 7 x weekly - 2-3 sets - 10-20 reps   - Supine Quadriceps Stretch with Strap on Table  - 2 x daily - 7 x weekly - 2-3 sets - 10-20 reps     2/3/25: squats     Assessment:  Pt overall progressing well under POC with improved functional mobility and gait. Continues to be limited with quad strength, however improved control with step ups and step downs. Challenged with dynamic balance exercises.       Plan:  Progress strength as tolerated; eccentric quad exercises   OP PT Plan  Number of Treatments Authorized: 20/MN (DED 3200 80/20 COVERAGE V MED [facility] REF#[id number])    Marisa Palafox, PT

## 2025-03-12 ENCOUNTER — TREATMENT (OUTPATIENT)
Dept: PHYSICAL THERAPY | Facility: CLINIC | Age: 60
End: 2025-03-12
Payer: COMMERCIAL

## 2025-03-12 DIAGNOSIS — M25.561 ACUTE PAIN OF RIGHT KNEE: Primary | ICD-10-CM

## 2025-03-12 PROCEDURE — 97112 NEUROMUSCULAR REEDUCATION: CPT | Mod: GP | Performed by: PHYSICAL THERAPIST

## 2025-03-12 PROCEDURE — 97110 THERAPEUTIC EXERCISES: CPT | Mod: GP | Performed by: PHYSICAL THERAPIST

## 2025-03-12 NOTE — PROGRESS NOTES
Physical Therapy Treatment    Patient Name: Amara Cote  MRN: [id number]  Today's Date: 3/12/2025  Time Calculation  Start Time: 1647  Stop Time: 1732  Time Calculation (min): 45 min  PT Therapeutic Procedures Time Entry  Neuromuscular Re-Education Time Entry: 16  Therapeutic Exercise Time Entry: 27       Current Problem  Problem List Items Addressed This Visit             ICD-10-CM       Musculoskeletal and Injuries    Right knee pain - Primary M25.561         Insurance:  Number of Treatments Authorized: 21/MN (DED 3200 80/20 COVERAGE V MED [facility] REF#[id number])          Subjective   General  Pt reports she's doing well but a little sore today. She was able to walk yesterday for a little over an hour with one rest break. Didn't feel like she was limping. Was on her feet for a while after too.     Performing HEP?: Yes    Precautions  Precautions  Precautions Comment: Low fall risk (WBAT with brace donned- cleared to wean from brace on 1/19/24 per PA)  Pain   0/10 R knee     Objective   R knee full extension  R SLS 18 sec     Treatments:   Therapeutic Exercise  SportsArt L3 x 6 min, L4 x 1 min   Dynamics: knee hugs, butt kicks, tin soldiers, fwd lunges x 40 ft   R/L lateral steps with green loop at ankles x 40ft each direction  F/B diagonal steps with green loop at ankles x 40ft each direction  R,L hip flexion green loop at ankles x 1 min each   HEP review- SL sit<>stands, lunges, SLS    NM Re-ed  R,L slider lateral lunges x 1 min each  R,L slider posterolateral lunges x 1 min each   R,L slider posterior lunges x 1 min each   R,L slider anterior x 1 min each   R,L SLS with anterior reach to rail x 1 min each    OP EDUCATION:  *reviewed - 2/21/25: R modified SL squat to chair/sit<>stand     2/11/2025: Added to HEP   - Sideways Step Touch  - 1 x daily - 1-2 x weekly - 2-3 sets - 10-20 reps   - Squat  - 1 x daily - 1-2 x weekly - 2-3 sets - 10-20 reps   - Heel Raise on Step  - 1 x daily - 1-2 x weekly - 2-3 sets -  10-20 reps   - Prone Quad Stretch with Towel Roll and Strap  - 2 x daily - 7 x weekly - 2-3 sets - 10-20 reps   - Supine Quadriceps Stretch with Strap on Table  - 2 x daily - 7 x weekly - 2-3 sets - 10-20 reps     2/3/25: squats     Assessment:  Pt progressing well under POC with improved gait and distance ambulated at home. Continues with evident strength deficits in R>L quad during sliders, particularly anterior motion. Challenged with dynamic SLS.       Plan:  Progress strength as tolerated; eccentric quad exercises   OP PT Plan  Number of Treatments Authorized: 21/MN (DED 0099 80/20 COVERAGE V MED [facility] REF#[id number])    Marisa Palafox, PT

## 2025-03-17 ENCOUNTER — TREATMENT (OUTPATIENT)
Dept: PHYSICAL THERAPY | Facility: CLINIC | Age: 60
End: 2025-03-17
Payer: COMMERCIAL

## 2025-03-17 DIAGNOSIS — M25.561 ACUTE PAIN OF RIGHT KNEE: Primary | ICD-10-CM

## 2025-03-17 PROCEDURE — 97110 THERAPEUTIC EXERCISES: CPT | Mod: GP

## 2025-03-17 NOTE — PROGRESS NOTES
Physical Therapy Treatment    Patient Name: Amara Cote  MRN: [id number]  Today's Date: 3/17/2025  Time Calculation  Start Time: 1653  Stop Time: 1736  Time Calculation (min): 43 min  PT Therapeutic Procedures Time Entry  Therapeutic Exercise Time Entry: 40  Therapeutic Activity Time Entry: 3       Current Problem  Problem List Items Addressed This Visit             ICD-10-CM    Right knee pain - Primary M25.561           Insurance:  Number of Treatments Authorized: 22/MN (DED 3200 80/20 COVERAGE V MED [facility] REF#[id number])          Subjective   General  Stiffness from sitting, no pain. Felt fine after last session, knee was a bit achey but not tto bad.  Walked for about 1 hour on Friday but had to break it up.     Performing HEP?: Yes    Precautions  Precautions  Precautions Comment: Low fall risk (WBAT with brace donned- cleared to wean from brace on 1/19/24 per PA)  Pain   0/10 R knee     Objective     Treatments:   Therapeutic Exercise  SportsArt L3 x 5 min   Semiloaded knee flexion x 1 min  Standing R Knee ext with self OP x 1 min   TG L 7 R S/L squats 6 x 2, eccentric x 6   R eccentric LAQ 10#s 10 x 2   Matrix: R hip ext 5#s x 1 min, 7.5#s x 1 min  R fwd step up/down 12 inch step  x 6 reps each direction    Theract: fwd step down top step on flight of stairs leading with L LE x 5 reps - cues to diminish UE assist      OP EDUCATION:  3/17/25: Instructed pt to practice x 10 reps with fwd step down , moving further up flight of staris every few days    *reviewed - 2/21/25: R modified SL squat to chair/sit<>stand     2/11/2025: Added to HEP   - Sideways Step Touch  - 1 x daily - 1-2 x weekly - 2-3 sets - 10-20 reps   - Squat  - 1 x daily - 1-2 x weekly - 2-3 sets - 10-20 reps   - Heel Raise on Step  - 1 x daily - 1-2 x weekly - 2-3 sets - 10-20 reps   - Prone Quad Stretch with Towel Roll and Strap  - 2 x daily - 7 x weekly - 2-3 sets - 10-20 reps   - Supine Quadriceps Stretch with Strap on Table  - 2 x daily - 7  x weekly - 2-3 sets - 10-20 reps     2/3/25: squats     Assessment:  Pt is slowly progressing.  Fatigues quickly during S/L TG squats and continues to have a very weak quad, mostly at TKE.  Limitation with stairs is self limiting due to fear of falling, pt demo'd understanding of education above.     Plan:  Progress strength as tolerated; eccentric quad exercises   F/U with education above.  OP PT Plan  Number of Treatments Authorized: 22/MN (DED 3200 80/20 COVERAGE V MED [facility] REF#[id number])    Gabriele Long, PT

## 2025-03-19 ENCOUNTER — APPOINTMENT (OUTPATIENT)
Dept: OPHTHALMOLOGY | Facility: CLINIC | Age: 60
End: 2025-03-19
Payer: COMMERCIAL

## 2025-03-19 DIAGNOSIS — H40.003 GLAUCOMA SUSPECT OF BOTH EYES: Primary | ICD-10-CM

## 2025-03-19 PROCEDURE — 99214 OFFICE O/P EST MOD 30 MIN: CPT | Performed by: OPHTHALMOLOGY

## 2025-03-19 PROCEDURE — 92133 CPTRZD OPH DX IMG PST SGM ON: CPT | Performed by: OPHTHALMOLOGY

## 2025-03-19 ASSESSMENT — ENCOUNTER SYMPTOMS
NEUROLOGICAL NEGATIVE: 0
CARDIOVASCULAR NEGATIVE: 0
HEMATOLOGIC/LYMPHATIC NEGATIVE: 0
ENDOCRINE NEGATIVE: 0
ALLERGIC/IMMUNOLOGIC NEGATIVE: 0
EYES NEGATIVE: 1
RESPIRATORY NEGATIVE: 0
CONSTITUTIONAL NEGATIVE: 0
PSYCHIATRIC NEGATIVE: 0
GASTROINTESTINAL NEGATIVE: 0
MUSCULOSKELETAL NEGATIVE: 0

## 2025-03-19 ASSESSMENT — VISUAL ACUITY
CORRECTION_TYPE: GLASSES
OS_CC: 20/30
OD_CC+: -1
OS_CC+: -2
METHOD: SNELLEN - LINEAR
OD_CC: 20/20

## 2025-03-19 ASSESSMENT — CUP TO DISC RATIO
OD_RATIO: 0.45
OS_RATIO: 0.4

## 2025-03-19 ASSESSMENT — EXTERNAL EXAM - LEFT EYE: OS_EXAM: NORMAL

## 2025-03-19 ASSESSMENT — TONOMETRY
OS_IOP_MMHG: 17
IOP_METHOD: GOLDMANN APPLANATION
OD_IOP_MMHG: 18

## 2025-03-19 ASSESSMENT — PACHYMETRY
OS_CT(UM): 584
OD_CT(UM): 574

## 2025-03-19 ASSESSMENT — SLIT LAMP EXAM - LIDS
COMMENTS: NORMAL
COMMENTS: NORMAL

## 2025-03-19 ASSESSMENT — EXTERNAL EXAM - RIGHT EYE: OD_EXAM: NORMAL

## 2025-03-19 NOTE — PROGRESS NOTES
ocular htn/glaucoma suspect, both eyes:   new tmax 27 OU   pachs thicker   no evidence of k spindle or TIDS but gonio with c insertion iris and heavily pigmented   no family hx of glaucoma   she does have c/d asymmetry  OCT, Optic Nerve - OU - Both Eyes          Stable to 21.            great reduction with latan qhs, continue for now   goal IOP <21   Iop AND TESTING STABLE     rtc 6 mo IOP check and HVF 24-2    PVD, BOTH  No RT/retinal detachment (RD), S/S d/w patient, to call if they occur  monitor

## 2025-03-20 ENCOUNTER — APPOINTMENT (OUTPATIENT)
Dept: PHYSICAL THERAPY | Facility: CLINIC | Age: 60
End: 2025-03-20
Payer: COMMERCIAL

## 2025-03-20 ENCOUNTER — TELEPHONE (OUTPATIENT)
Dept: PHYSICAL THERAPY | Facility: CLINIC | Age: 60
End: 2025-03-20
Payer: COMMERCIAL

## 2025-03-21 ENCOUNTER — APPOINTMENT (OUTPATIENT)
Dept: PHYSICAL THERAPY | Facility: CLINIC | Age: 60
End: 2025-03-21
Payer: COMMERCIAL

## 2025-03-24 ENCOUNTER — TREATMENT (OUTPATIENT)
Dept: PHYSICAL THERAPY | Facility: CLINIC | Age: 60
End: 2025-03-24
Payer: COMMERCIAL

## 2025-03-24 DIAGNOSIS — M25.561 ACUTE PAIN OF RIGHT KNEE: Primary | ICD-10-CM

## 2025-03-24 PROCEDURE — 97110 THERAPEUTIC EXERCISES: CPT | Mod: GP

## 2025-03-24 PROCEDURE — 97112 NEUROMUSCULAR REEDUCATION: CPT | Mod: GP

## 2025-03-24 NOTE — PROGRESS NOTES
Physical Therapy Treatment    Patient Name: Amara Cote  MRN: [id number]  Today's Date: 3/24/2025  Time Calculation  Start Time: 1734  Stop Time: 1819  Time Calculation (min): 45 min  PT Therapeutic Procedures Time Entry  Neuromuscular Re-Education Time Entry: 13  Therapeutic Exercise Time Entry: 30       Current Problem  Problem List Items Addressed This Visit             ICD-10-CM    Right knee pain - Primary M25.561             Insurance:  Number of Treatments Authorized: 23/MN (DED 3200 80/20 COVERAGE V MED [facility] REF#[id number])          Subjective   General  Has been busy.  Had to do a long drive over the weekend and it was fine.  Did not get a chance to practice descending stairs but did carry a cooler up a flight of stairs.    Performing HEP?: Yes    Precautions  Precautions  Precautions Comment: Low fall risk (WBAT with brace donned- cleared to wean from brace on 1/19/24 per PA)  Pain   0/10 R knee     Objective     Treatments:   Therapeutic Exercise  SportsArt L3 x 6 min   Loaded R knee ext with self OP x 10  Loaded R knee flexion x 2 min  R Eccentric fwd step down 8 inch step 1 min x 2   NM re-ed:  R S/L hip hinge/RDL 1 min x 2   R Fwd step up on BOSU round side up 1 min x 2   Squats on BOSU flat side up 1 min x 2   R S/L heel raise on incline board 15, x 20   R LAQ 10#s  with eccentric burnout 1 min x 2     OP EDUCATION:  3/24/25: verbally progressed with Loaded knee flexion x 2-3 a day     3/17/25: Instructed pt to practice x 10 reps with fwd step down, moving further up flight of staris every few days    *reviewed - 2/21/25: R modified SL squat to chair/sit<>stand     2/11/2025: Added to HEP   - Sideways Step Touch  - 1 x daily - 1-2 x weekly - 2-3 sets - 10-20 reps   - Squat  - 1 x daily - 1-2 x weekly - 2-3 sets - 10-20 reps   - Heel Raise on Step  - 1 x daily - 1-2 x weekly - 2-3 sets - 10-20 reps   - Prone Quad Stretch with Towel Roll and Strap  - 2 x daily - 7 x weekly - 2-3 sets - 10-20 reps   -  Supine Quadriceps Stretch with Strap on Table  - 2 x daily - 7 x weekly - 2-3 sets - 10-20 reps     2/3/25: squats     Assessment:  Pt is progressing well, denies pain with all there ex just end range tension with loaded knee flexion.  Has difficulty with balance stability exercises, relies heavily on UE assist.          Plan:  Progress strength as tolerated; eccentric quad exercises   F/U with loaded knee flexion   OP PT Plan  Number of Treatments Authorized: 23/MN (DED 3890 80/20 COVERAGE V MED [facility] REF#[id number])    Gabriele Long, PT

## 2025-03-26 ENCOUNTER — APPOINTMENT (OUTPATIENT)
Dept: PHYSICAL THERAPY | Facility: CLINIC | Age: 60
End: 2025-03-26
Payer: COMMERCIAL

## 2025-03-26 ENCOUNTER — TREATMENT (OUTPATIENT)
Dept: PHYSICAL THERAPY | Facility: CLINIC | Age: 60
End: 2025-03-26
Payer: COMMERCIAL

## 2025-03-26 DIAGNOSIS — M25.561 ACUTE PAIN OF RIGHT KNEE: Primary | ICD-10-CM

## 2025-03-26 PROCEDURE — 97110 THERAPEUTIC EXERCISES: CPT | Mod: GP | Performed by: PHYSICAL THERAPIST

## 2025-03-26 PROCEDURE — 97530 THERAPEUTIC ACTIVITIES: CPT | Mod: GP | Performed by: PHYSICAL THERAPIST

## 2025-03-26 NOTE — PROGRESS NOTES
Physical Therapy Treatment    Patient Name: Amara Cote  MRN: [id number]  Today's Date: 3/26/2025  Time Calculation  Start Time: 1712  Stop Time: 1800  Time Calculation (min): 48 min  PT Therapeutic Procedures Time Entry  Neuromuscular Re-Education Time Entry: 5  Therapeutic Exercise Time Entry: 31  Therapeutic Activity Time Entry: 9       Current Problem  Problem List Items Addressed This Visit             ICD-10-CM       Musculoskeletal and Injuries    Right knee pain - Primary M25.561     Insurance:  Number of Treatments Authorized: 24/MN (DED 3200 80/20 COVERAGE V MED [facility] REF#[id number])          Subjective   General  Pt reports she's sore from Monday [place] ~1 hour 15 minutes with stretch breaks. Stiff first thing in the morning ~5 minutes.     Performing HEP?: Yes    Precautions  Precautions  Precautions Comment: Low fall risk (WBAT with brace donned- cleared to wean from brace on 1/19/24 per PA)  Pain   0/10 R knee     Objective   Ambulating without deficits   R SLS airex 14 sec     Treatments:   Therapeutic Exercise  SportsArt L3 x 6 min   Dynamics: knee pulls, butt kicks, tin soldiers x2, x 40'   R/L lateral steps with green loop at ankles x 40ft each direction  F/B diagonal steps with green loop at ankles x 40ft each direction  Gastroc stretch slantboard x 1 min   R,L SL heel raises x 1 min each   Loaded R knee ext with self OP x 10  Tall kneel on airex, knee flexion x 10 reps     TA:  Squats 10# x 1 min   Split squats 10# R x 10 reps, L x 10 reps   Getting up/down from ground x 5 reps (unable to power up with either leg without UE support)  Decline squats x 1 min   R>L WB in sit<>stand from std chair x 10 reps - reviewed    Neuro Re-ed  R,L SLS airex x 1 min each  R,L forward T's to rail x 1 min each       OP EDUCATION:  3/24/25: verbally progressed with Loaded knee flexion x 2-3 a day     3/17/25: Instructed pt to practice x 10 reps with fwd step down, moving further up  flight of staris every few days    *reviewed - 2/21/25: R modified SL squat to chair/sit<>stand     2/11/2025: Added to HEP   - Sideways Step Touch  - 1 x daily - 1-2 x weekly - 2-3 sets - 10-20 reps   - Squat  - 1 x daily - 1-2 x weekly - 2-3 sets - 10-20 reps   - Heel Raise on Step  - 1 x daily - 1-2 x weekly - 2-3 sets - 10-20 reps   - Prone Quad Stretch with Towel Roll and Strap  - 2 x daily - 7 x weekly - 2-3 sets - 10-20 reps   - Supine Quadriceps Stretch with Strap on Table  - 2 x daily - 7 x weekly - 2-3 sets - 10-20 reps     2/3/25: squats     Assessment:  Pt showing excellent program with gait and functional mobility. Continues with R SL balance deficits, however improving. Challenged with squats but without an increase in pain levels.      Plan:  Progress strength as tolerated; eccentric quad exercises   F/U with loaded knee flexion   OP PT Plan  Number of Treatments Authorized: 24/MN (DED 3200 80/20 COVERAGE V MED [facility] REF#[id number])    Marisa Palafox, PT

## 2025-03-31 ENCOUNTER — TREATMENT (OUTPATIENT)
Dept: PHYSICAL THERAPY | Facility: CLINIC | Age: 60
End: 2025-03-31
Payer: COMMERCIAL

## 2025-03-31 DIAGNOSIS — M25.561 ACUTE PAIN OF RIGHT KNEE: Primary | ICD-10-CM

## 2025-03-31 PROCEDURE — 97110 THERAPEUTIC EXERCISES: CPT | Mod: GP

## 2025-03-31 NOTE — PROGRESS NOTES
Physical Therapy Treatment    Patient Name: Amara Cote  MRN: [id number]  Today's Date: 3/31/2025  Time Calculation  Start Time: 1737  Stop Time: 1818  Time Calculation (min): 41 min  PT Therapeutic Procedures Time Entry  Therapeutic Exercise Time Entry: 39       Current Problem  Problem List Items Addressed This Visit             ICD-10-CM    Right knee pain - Primary M25.561       Insurance:  Number of Treatments Authorized: 25/MN (DED 3200 80/20 COVERAGE V MED [facility] REF#[id number])          Subjective   General  Just got back from [place], had a good trip. Driving was fine, got a little sore towards the end but not bad.     Performing HEP?: Yes    Precautions  Precautions  Precautions Comment: Low fall risk (WBAT with brace donned- cleared to wean from brace on 1/19/24 per PA)  Pain   0/10 R knee     Objective   Ambulating without deficits   R SLS airex 14 sec     Treatments:   Therapeutic Exercise  SportsArt L3 x 6 min   Haseeb pose for repeated knee flexion x 2 min   Loaded R knee ext with self OP x 1 min  TG L 7 R, L S/L squat x 10 each LE  TG L 7 squats mid to end range  x 20  TG L7 squats x 20   R/L lateral steps with green loop at ankles 40ft x 2 each direction  F/B diagonal steps with green loop at ankles x 40ft each direction  F/B Monster walks green loop x 40 ft each direction   Decline squats 15 x 2   R,L SL heel raises on incline board 10 x 2 each   Gastroc stretch slantboard x 1 min     OP EDUCATION:  3/24/25: verbally progressed with Loaded knee flexion x 2-3 a day     3/17/25: Instructed pt to practice x 10 reps with fwd step down, moving further up flight of staris every few days    *reviewed - 2/21/25: R modified SL squat to chair/sit<>stand     2/11/2025: Added to HEP   - Sideways Step Touch  - 1 x daily - 1-2 x weekly - 2-3 sets - 10-20 reps   - Squat  - 1 x daily - 1-2 x weekly - 2-3 sets - 10-20 reps   - Heel Raise on Step  - 1 x daily - 1-2 x weekly - 2-3 sets - 10-20 reps   - Prone Quad  Stretch with Towel Roll and Strap  - 2 x daily - 7 x weekly - 2-3 sets - 10-20 reps   - Supine Quadriceps Stretch with Strap on Table  - 2 x daily - 7 x weekly - 2-3 sets - 10-20 reps     2/3/25: squats     Assessment:  Pt continues to have mild flexion ROM loss and fatigue more quickly with involved LE. However both are slowly equalizing to uninvolved LE.  She tolerated the session well, end rang tension with loaded flexion and denied pain during strengthening.       Plan:  Progress strength as tolerated; eccentric quad exercises   F/U with loaded knee flexion   OP PT Plan  Number of Treatments Authorized: 25/MN (DED 3200 80/20 COVERAGE V MED [facility] REF#[id number])    Gabriele Long, PT

## 2025-04-02 ENCOUNTER — TELEPHONE (OUTPATIENT)
Dept: PHYSICAL THERAPY | Facility: CLINIC | Age: 60
End: 2025-04-02
Payer: COMMERCIAL

## 2025-04-02 ENCOUNTER — TREATMENT (OUTPATIENT)
Dept: PHYSICAL THERAPY | Facility: CLINIC | Age: 60
End: 2025-04-02
Payer: COMMERCIAL

## 2025-04-02 DIAGNOSIS — M25.561 ACUTE PAIN OF RIGHT KNEE: ICD-10-CM

## 2025-04-02 PROCEDURE — 97110 THERAPEUTIC EXERCISES: CPT | Mod: GP

## 2025-04-02 PROCEDURE — 97530 THERAPEUTIC ACTIVITIES: CPT | Mod: GP

## 2025-04-02 NOTE — PROGRESS NOTES
Physical Therapy Treatment    Patient Name: Amara Cote  MRN: [id number]  Today's Date: 4/2/2025  Time Calculation  Start Time: 1649  Stop Time: 1731  Time Calculation (min): 42 min  PT Therapeutic Procedures Time Entry  Therapeutic Exercise Time Entry: 23  Therapeutic Activity Time Entry: 17       Current Problem  Problem List Items Addressed This Visit             ICD-10-CM    Right knee pain M25.561       Insurance:  Number of Treatments Authorized: 26/MN (DED 3200 80/20 COVERAGE V MED NEC REF#[id number])          Subjective   General  Having a stressful day.  Knee is feeling fine. Had some sharp pain last night while turning over in bed.   [place]     Performing HEP?: Yes    Precautions  Precautions  Precautions Comment: Low fall risk (WBAT with brace donned- cleared to wean from brace on 1/19/24 per PA)  Pain   0/10 R knee     Objective     Treatments:   Therapeutic Exercise  SportsArt L3 x 5 min   Loaded R knee ext with self OP x 1 min  Haseeb pose for repeated knee flexion x 1 min   R S/L split squat 1 min -  R fwd step up on chair x 10 - PT UE assist for balance    There act:  4 inch step   F/B quick steps R/L leading x 30 sec each LE  Straddle step up R/L leading x 30 sec each LE  Vertical jumps 10 x 2   R/L skaters 10 x 2   Jogging/walk 40 ft x 8    Therex:  LAQ R eccentric 20#s 12 x 2  R HS curl machine 30#s 1 min x2     OP EDUCATION:  3/24/25: verbally progressed with Loaded knee flexion x 2-3 a day     3/17/25: Instructed pt to practice x 10 reps with fwd step down, moving further up flight of staris every few days    *reviewed - 2/21/25: R modified SL squat to chair/sit<>stand     2/11/2025: Added to HEP   - Sideways Step Touch  - 1 x daily - 1-2 x weekly - 2-3 sets - 10-20 reps   - Squat  - 1 x daily - 1-2 x weekly - 2-3 sets - 10-20 reps   - Heel Raise on Step  - 1 x daily - 1-2 x weekly - 2-3 sets - 10-20 reps   - Prone Quad Stretch with Towel Roll  and Strap  - 2 x daily - 7 x weekly - 2-3 sets - 10-20 reps   - Supine Quadriceps Stretch with Strap on Table  - 2 x daily - 7 x weekly - 2-3 sets - 10-20 reps     2/3/25: squats     Assessment:  Pt tolerated progress athletic/dynamic activity well.  Demo's more midfoot strike on involved LE with jogging, c/o of sensation but no pain. Overall slowly progressing with strength and function.        Plan:  Progress strength /function as tolerated  Continue to encourage daily walking and ROM procedures several times a day.   OP PT Plan  Number of Treatments Authorized: 26/MN (DED 3200 80/20 COVERAGE V MED [facility] REF#[id number])    Gabriele Long, PT

## 2025-04-07 ENCOUNTER — TREATMENT (OUTPATIENT)
Dept: PHYSICAL THERAPY | Facility: CLINIC | Age: 60
End: 2025-04-07
Payer: COMMERCIAL

## 2025-04-07 DIAGNOSIS — M25.561 ACUTE PAIN OF RIGHT KNEE: ICD-10-CM

## 2025-04-07 PROCEDURE — 97110 THERAPEUTIC EXERCISES: CPT | Mod: GP

## 2025-04-07 PROCEDURE — 97530 THERAPEUTIC ACTIVITIES: CPT | Mod: GP

## 2025-04-07 NOTE — PROGRESS NOTES
Physical Therapy Treatment    Patient Name: Amara Cote  MRN: [id number]  Today's Date: 4/7/2025  Time Calculation  Start Time: 1733  Stop Time: 1815  Time Calculation (min): 42 min  PT Therapeutic Procedures Time Entry  Therapeutic Exercise Time Entry: 34  Therapeutic Activity Time Entry: 6       Current Problem  Problem List Items Addressed This Visit             ICD-10-CM    Right knee pain M25.561       Insurance:  Number of Treatments Authorized: 27/MN (DED 3200 80/20 COVERAGE V MED NEC REF#[id number])          Subjective   General  No issues since last session.  Walked over the weekend 90 min.  Was able to do stretching over the weekend.  Knee is stiff today from work.     Performing HEP?: Yes    Precautions  Precautions  Precautions Comment: Low fall risk (WBAT with brace donned- cleared to wean from brace on 1/19/24 per PA)  Pain   0/10 R knee     Objective     Treatments:   Therapeutic Exercise  SportsArt L3 x 5 min   Haseeb pose for repeated knee flexion x 2 min   Loaded R knee ext with self OP x 2 min  R S/L Leg press 40#s 8 x 2, 30#s x 15 with B burnout at 80#s x 10   R LAQ machine 10#s 10 x 2, R eccentric 20#s x 10   At // bars: R S/L heel raise on incline 10 x 2     Theract:  R/L lateral leaps x 1 min   R, L fwd leaps x 1 min each       OP EDUCATION:  3/24/25: verbally progressed with Loaded knee flexion x 2-3 a day     3/17/25: Instructed pt to practice x 10 reps with fwd step down, moving further up flight of staris every few days    *reviewed - 2/21/25: R modified SL squat to chair/sit<>stand     2/11/2025: Added to HEP   - Sideways Step Touch  - 1 x daily - 1-2 x weekly - 2-3 sets - 10-20 reps   - Squat  - 1 x daily - 1-2 x weekly - 2-3 sets - 10-20 reps   - Heel Raise on Step  - 1 x daily - 1-2 x weekly - 2-3 sets - 10-20 reps   - Prone Quad Stretch with Towel Roll and Strap  - 2 x daily - 7 x weekly - 2-3 sets - 10-20 reps   - Supine Quadriceps Stretch with Strap on Table  - 2 x daily - 7 x  weekly - 2-3 sets - 10-20 reps     2/3/25: squats     Assessment:  Pt had good effort during strengthening today, denied pain during the session just fatigue.  Has difficulty with ydnamic movements, hesitant to push off and land on involved LE, self limiting.        Plan:  Progress strength /function as tolerated  Continue to encourage daily walking and ROM procedures several times a day.   OP PT Plan  Number of Treatments Authorized: 27/MN (DED 3200 80/20 COVERAGE V MED NEC REF#[id number])    Gabriele Long, PT

## 2025-04-09 ENCOUNTER — TREATMENT (OUTPATIENT)
Dept: PHYSICAL THERAPY | Facility: CLINIC | Age: 60
End: 2025-04-09
Payer: COMMERCIAL

## 2025-04-09 DIAGNOSIS — M25.561 ACUTE PAIN OF RIGHT KNEE: ICD-10-CM

## 2025-04-09 PROCEDURE — 97110 THERAPEUTIC EXERCISES: CPT | Mod: GP | Performed by: PHYSICAL THERAPIST

## 2025-04-09 PROCEDURE — 97530 THERAPEUTIC ACTIVITIES: CPT | Mod: GP | Performed by: PHYSICAL THERAPIST

## 2025-04-09 NOTE — PROGRESS NOTES
Physical Therapy Treatment    Patient Name: Amara Cote  MRN: [id number]  Today's Date: 4/9/2025  Time Calculation  Start Time: 1734  Stop Time: 1817  Time Calculation (min): 43 min  PT Therapeutic Procedures Time Entry  Therapeutic Exercise Time Entry: 30  Therapeutic Activity Time Entry: 12       Current Problem  Problem List Items Addressed This Visit             ICD-10-CM       Musculoskeletal and Injuries    Right knee pain M25.561         Insurance:  Number of Treatments Authorized: 28/MN (DED 3200 80/20 COVERAGE V MED NEC REF#[id number])          Subjective   General  Pt reports soreness after last session. Wasn't able to walk yesterday d/t work schedule and felt stiff.     Performing HEP?: Yes    Precautions  Precautions  Precautions Comment: No fall risk (WBAT with brace donned- cleared to wean from brace on 1/19/24 per PA)  Pain   0/10 R knee     Objective   Negative antalgia on arrival (-)     Treatments:   Therapeutic Exercise  Dynamics: knee hugs, butt kicks x 40' each   SportsArt L4 x 4 min, L3 x 2 min   Gastroc stretch slantboard x 1 min   Heel raises slantboard x 1 min   TGL7 B squats x 1 min, R SL x 1 min (L LE toe on plate)   Loaded R knee ext with self OP x 2 min  R LAQ machine eccentric 20#s x 1 min, R LAQ machine 10#s x 1 min       Theract:  Squats x 2 min with breaks   R>L kick-stand RDLs 15# x 1 min  Sumo squats 15# x 1 min - cues for knee positioning, repeat x 1 min   Sit<>stand with 15# KB anterior x 1 min      OP EDUCATION:  3/24/25: verbally progressed with Loaded knee flexion x 2-3 a day     3/17/25: Instructed pt to practice x 10 reps with fwd step down, moving further up flight of staris every few days    *reviewed - 2/21/25: R modified SL squat to chair/sit<>stand     2/11/2025: Added to HEP   - Sideways Step Touch  - 1 x daily - 1-2 x weekly - 2-3 sets - 10-20 reps   - Squat  - 1 x daily - 1-2 x weekly - 2-3 sets - 10-20 reps   - Heel Raise on Step  - 1 x daily - 1-2 x weekly - 2-3  sets - 10-20 reps   - Prone Quad Stretch with Towel Roll and Strap  - 2 x daily - 7 x weekly - 2-3 sets - 10-20 reps   - Supine Quadriceps Stretch with Strap on Table  - 2 x daily - 7 x weekly - 2-3 sets - 10-20 reps     2/3/25: squats     Assessment:  Pt showing good progress under POC, but continues to demonstrate greater fatigue with R>L LE. Cues needed for proper positioning for knee in sumos and SL squat on TG. Challenged with RDLs, squat progressions and knee extension machine.       Plan:  Progress strength /function as tolerated  Continue to encourage daily walking and ROM procedures several times a day.   OP PT Plan  Number of Treatments Authorized: 28/MN (DED 3200 80/20 COVERAGE V MED [facility] REF#[id number])    Marisa Palafox, PT

## 2025-04-14 ENCOUNTER — TREATMENT (OUTPATIENT)
Dept: PHYSICAL THERAPY | Facility: CLINIC | Age: 60
End: 2025-04-14
Payer: COMMERCIAL

## 2025-04-14 DIAGNOSIS — M25.561 ACUTE PAIN OF RIGHT KNEE: ICD-10-CM

## 2025-04-14 PROCEDURE — 97110 THERAPEUTIC EXERCISES: CPT | Mod: GP

## 2025-04-14 NOTE — PROGRESS NOTES
"  Physical Therapy Treatment    Patient Name: Amara Cote  MRN: [id number]  Today's Date: 4/15/2025  Time Calculation  Start Time: 1741  Stop Time: 1825  Time Calculation (min): 44 min  PT Therapeutic Procedures Time Entry  Therapeutic Exercise Time Entry: 42       Current Problem  Problem List Items Addressed This Visit             ICD-10-CM    Right knee pain M25.561         Insurance:  Number of Treatments Authorized: 29/MN (DED 3200 80/20 COVERAGE V MED [facility]EC REF#[id number])          Subjective   General  Increased walking distance and speed Friday and Saturday.  Took the day off yesterday because it felt weird. Took the day off and iced a bit. Feels fine today.     Performing HEP?: Yes    Precautions  Precautions  Precautions Comment: No fall risk (WBAT with brace donned- cleared to wean from brace on 1/19/24 per PA)  Pain   0/10 R knee, still feels a bit \"weird\"    Objective   Seated Knee Dynamometer (best score)  L knee flexion = 15.6 kg  R knee flexion = 9.7 kg   L knee ext = 23.4 kg  R knee ext = 20.6 kg     Treatments:   Therapeutic Exercise   SportsArt L3/4 x 5 min   Haseeb pose for knee flexion x 2 min   Loaded R knee ext with self OP x 2 min  TG L 7 B squats  1 min x 2   Dynamics: knee hugs, butt kicks , Fwd lunges  x 2 x 40' each  R HS curl machine 30#s 1 min x 2  Red SB Bridge to HS curl 10 x 2       OP EDUCATION:  4/14/25: Verbally added Red SB HS curl and encouraged s/sit to stand and split squat at home x 1 a week.     3/24/25: verbally progressed with Loaded knee flexion x 2-3 a day     3/17/25: Instructed pt to practice x 10 reps with fwd step down, moving further up flight of staris every few days    *reviewed - 2/21/25: R modified SL squat to chair/sit<>stand     2/11/2025: Added to HEP   - Sideways Step Touch  - 1 x daily - 1-2 x weekly - 2-3 sets - 10-20 reps   - Squat  - 1 x daily - 1-2 x weekly - 2-3 sets - 10-20 reps   - Heel Raise on Step  - 1 x daily - 1-2 x weekly - 2-3 sets - 10-20 reps "   - Prone Quad Stretch with Towel Roll and Strap  - 2 x daily - 7 x weekly - 2-3 sets - 10-20 reps   - Supine Quadriceps Stretch with Strap on Table  - 2 x daily - 7 x weekly - 2-3 sets - 10-20 reps     2/3/25: squats     Assessment:  Pt is progressing well, quad strength is equalizing, HS remains weak.  HEP was verbally updated.  There ex was regressed due to subjective report, she tolerated the session well, felt good leaving PT.       Plan:  Decrease PT to x 1 a week   Progress LE strengthening with focus on HS.  OP PT Plan  Number of Treatments Authorized: 29/MN (DED 3200 80/20 COVERAGE V MED [facility]EC REF#[id number])    Gabriele Long, PT

## 2025-04-16 ENCOUNTER — TREATMENT (OUTPATIENT)
Dept: PHYSICAL THERAPY | Facility: CLINIC | Age: 60
End: 2025-04-16
Payer: COMMERCIAL

## 2025-04-16 DIAGNOSIS — S82.141A TIBIAL PLATEAU FRACTURE, RIGHT, CLOSED, INITIAL ENCOUNTER: ICD-10-CM

## 2025-04-16 DIAGNOSIS — M25.561 ACUTE PAIN OF RIGHT KNEE: Primary | ICD-10-CM

## 2025-04-16 PROCEDURE — 97110 THERAPEUTIC EXERCISES: CPT | Mod: GP | Performed by: PHYSICAL THERAPIST

## 2025-04-16 NOTE — PROGRESS NOTES
"  Physical Therapy Treatment    Patient Name: Amara Cote  MRN: [id number]  Today's Date: 4/16/2025  Time Calculation  Start Time: 1737  Stop Time: 1816  Time Calculation (min): 39 min  PT Therapeutic Procedures Time Entry  Therapeutic Exercise Time Entry: 38       Current Problem  Problem List Items Addressed This Visit           ICD-10-CM       Musculoskeletal and Injuries    Right knee pain - Primary M25.561    Tibial plateau fracture, right, closed, initial encounter S82.141A           Insurance:  Number of Treatments Authorized: 30/MN (DED 3200 80/20 COVERAGE V MED NEC REF#[id number])          Subjective   General  Pt reports she feels good.     Performing HEP?: Yes    Precautions  Precautions  Precautions Comment: No fall risk (WBAT with brace donned- cleared to wean from brace on 1/19/24 per PA)  Pain   0/10 R knee, still feels a bit \"weird\"    Objective   Normal gait, slight valgus with SL squats - improving with cues    Treatments:   Therapeutic Exercise   SportsArt L3/4 x 5 min   R,L SL heel raises x 1 min each   R/L stool scoots 40' x 4  TGL6 R,L SL squats x 1 min each, repeat with R LE   B HS curl machine 60# x 1 min   R SL HS curl machine 30# x 1 min, 40# x 1 min  Decline squats 15# 1 min, x2  RDLs 15# 1 min x 2 - cues for form and mirror for feedback   Sumo RDL 10# x 1 min       OP EDUCATION:  4/14/25: Verbally added Red SB HS curl and encouraged s/sit to stand and split squat at home x 1 a week.     3/24/25: verbally progressed with Loaded knee flexion x 2-3 a day     3/17/25: Instructed pt to practice x 10 reps with fwd step down, moving further up flight of staris every few days    *reviewed - 2/21/25: R modified SL squat to chair/sit<>stand     2/11/2025: Added to HEP   - Sideways Step Touch  - 1 x daily - 1-2 x weekly - 2-3 sets - 10-20 reps   - Squat  - 1 x daily - 1-2 x weekly - 2-3 sets - 10-20 reps   - Heel Raise on Step  - 1 x daily - 1-2 x weekly - 2-3 sets - 10-20 reps   - Prone Quad " Stretch with Towel Roll and Strap  - 2 x daily - 7 x weekly - 2-3 sets - 10-20 reps   - Supine Quadriceps Stretch with Strap on Table  - 2 x daily - 7 x weekly - 2-3 sets - 10-20 reps     2/3/25: squats     Assessment:  Pt overall progressing well. Weakness persisted with R>L hamstring strength demo'd in HS curl machine and stool scoots. Challenged with decline and RDLs but with good form. Reviewed HEP strengthening with good understanding.       Plan:  Decrease PT to x 1 a week   Progress LE strengthening with focus on HS.  OP PT Plan  Number of Treatments Authorized: 30/MN (DED 3200 80/20 COVERAGE V MED NEC REF#[id number])    Marisa Palafox, PT

## 2025-04-18 ENCOUNTER — TELEPHONE (OUTPATIENT)
Facility: CLINIC | Age: 60
End: 2025-04-18

## 2025-04-18 DIAGNOSIS — I10 PRIMARY HYPERTENSION: ICD-10-CM

## 2025-04-18 RX ORDER — AMLODIPINE BESYLATE 5 MG/1
5 TABLET ORAL DAILY
Qty: 90 TABLET | Refills: 3 | Status: SHIPPED | OUTPATIENT
Start: 2025-04-18 | End: 2026-04-18

## 2025-04-21 ENCOUNTER — APPOINTMENT (OUTPATIENT)
Facility: CLINIC | Age: 60
End: 2025-04-21
Payer: COMMERCIAL

## 2025-04-21 VITALS
BODY MASS INDEX: 18.96 KG/M2 | WEIGHT: 118 LBS | HEIGHT: 66 IN | SYSTOLIC BLOOD PRESSURE: 137 MMHG | DIASTOLIC BLOOD PRESSURE: 84 MMHG

## 2025-04-21 DIAGNOSIS — R92.343 EXTREMELY DENSE TISSUE OF BOTH BREASTS ON MAMMOGRAPHY: ICD-10-CM

## 2025-04-21 DIAGNOSIS — Z01.419 ENCOUNTER FOR GYNECOLOGICAL EXAMINATION WITHOUT ABNORMAL FINDING: Primary | ICD-10-CM

## 2025-04-21 PROCEDURE — 3079F DIAST BP 80-89 MM HG: CPT | Performed by: OBSTETRICS & GYNECOLOGY

## 2025-04-21 PROCEDURE — 99396 PREV VISIT EST AGE 40-64: CPT | Performed by: OBSTETRICS & GYNECOLOGY

## 2025-04-21 PROCEDURE — 1036F TOBACCO NON-USER: CPT | Performed by: OBSTETRICS & GYNECOLOGY

## 2025-04-21 PROCEDURE — 3008F BODY MASS INDEX DOCD: CPT | Performed by: OBSTETRICS & GYNECOLOGY

## 2025-04-21 PROCEDURE — 3075F SYST BP GE 130 - 139MM HG: CPT | Performed by: OBSTETRICS & GYNECOLOGY

## 2025-04-21 ASSESSMENT — ENCOUNTER SYMPTOMS
LOSS OF SENSATION IN FEET: 0
DEPRESSION: 0
OCCASIONAL FEELINGS OF UNSTEADINESS: 0

## 2025-04-21 ASSESSMENT — PATIENT HEALTH QUESTIONNAIRE - PHQ9
1. LITTLE INTEREST OR PLEASURE IN DOING THINGS: NOT AT ALL
SUM OF ALL RESPONSES TO PHQ9 QUESTIONS 1 AND 2: 0
2. FEELING DOWN, DEPRESSED OR HOPELESS: NOT AT ALL

## 2025-04-21 NOTE — PROGRESS NOTES
Subjective   Amara Cote is a 59 y.o. female here for a routine exam.  She has no postmenopausal bleeding or discharge.  No dysuria or change in bowel habits.  She is current on her colonoscopy.  She denies significant menopausal symptoms.   We discussed she had a right tibial fracture that has been quite debilitating and is currently obtaining physical therapy.    The bone density August 3, 2020 showed osteopenia, T-score -1.4.  She does have another bone density scheduled with her PCP.    She has extremely dense breast tissue noted on mammogram and questions if she should have a fast MRI. Personal health questionnaire reviewed: yes.     Gynecologic History  No LMP recorded (lmp unknown). Patient is postmenopausal.  Contraception: post menopausal status  Last Pap: 24. Results were: normal  Last mammogram: 24. Results were: normal    Obstetric History  OB History    Para Term  AB Living   1 1 1      SAB IAB Ectopic Multiple Live Births             # Outcome Date GA Lbr Michael/2nd Weight Sex Type Anes PTL Lv   1 Term                Objective   Constitutional: Alert and in no acute distress. Well developed, well nourished.   Head and Face: Head and face: Normal.    Eyes: Normal external exam - nonicteric sclera, extraocular movements intact (EOMI) and no ptosis.   Neck: No neck asymmetry. Supple. Thyroid not enlarged and there were no palpable thyroid nodules.    Pulmonary: No respiratory distress.   Chest: Breasts: Normal appearance, no nipple discharge and no skin changes. Palpation of breasts and axillae: No palpable mass and no axillary lymphadenopathy.   Abdomen: Soft nontender; no abdominal mass palpated. No organomegaly. No hernias.   Genitourinary: External genitalia: Normal. No inguinal lymphadenopathy. Bartholin's Urethral and Skenes Glands: Normal. Urethra: Normal.  Bladder: Normal on palpation. Vagina: Normal. Cervix: Normal.  Uterus: Normal.  Right Adnexa/parametria: Normal.   Left Adnexa/parametria: Normal.  Inspection of Perianal Area: Normal.   Musculoskeletal: No joint swelling seen, normal movements of all extremities.   Skin: Normal skin color and pigmentation, normal skin turgor, and no rash.   Neurologic: Non-focal. Grossly intact.   Psychiatric: Alert and oriented x 3. Affect normal to patient baseline. Mood: Appropriate.  Physical Exam     Assessment/Plan   Healthy female exam.  This is a 59-year-old female with a normal exam.  No Pap smear was sent, she is high risk HPV negative in 2024.    Her routine mammogram will be due in December 2025.  She does have extremely dense breast tissue, and a fast MRI was ordered to alternate with the mammograms.    I will see her in 1 year.  Education reviewed: self breast exams.  Mammogram ordered.

## 2025-04-22 ENCOUNTER — TREATMENT (OUTPATIENT)
Dept: PHYSICAL THERAPY | Facility: CLINIC | Age: 60
End: 2025-04-22
Payer: COMMERCIAL

## 2025-04-22 DIAGNOSIS — S82.141D TIBIAL PLATEAU FRACTURE, RIGHT, CLOSED, WITH ROUTINE HEALING, SUBSEQUENT ENCOUNTER: ICD-10-CM

## 2025-04-22 DIAGNOSIS — M25.561 ACUTE PAIN OF RIGHT KNEE: Primary | ICD-10-CM

## 2025-04-22 PROCEDURE — 97110 THERAPEUTIC EXERCISES: CPT | Mod: GP | Performed by: PHYSICAL THERAPIST

## 2025-04-22 NOTE — PROGRESS NOTES
"  Physical Therapy Treatment    Patient Name: Amara Cote  MRN: [id number]  Today's Date: 4/22/2025  Time Calculation  Start Time: 1238  Stop Time: 1318  Time Calculation (min): 40 min  PT Therapeutic Procedures Time Entry  Therapeutic Exercise Time Entry: 39       Current Problem  Problem List Items Addressed This Visit           ICD-10-CM       Musculoskeletal and Injuries    Right knee pain - Primary M25.561    Tibial plateau fracture, right, closed, initial encounter S82.141A     Insurance:  Number of Treatments Authorized: 31/MN (DED 3200 80/20 COVERAGE V MED [facility] REF#[id number])          Subjective   General  Pt reports she feels good but was gone all weekend so a little stiff. Wasn't able to get in as many exercises. Walking today after session.     Performing HEP?: Yes    Precautions  Precautions  Precautions Comment: No fall risk (WBAT with brace donned- cleared to wean from brace on 1/19/24 per PA)  Pain   0/10 R knee, still feels a bit \"weird\"    Objective   Normal gait, reduced depth in decline squats    Treatments:   Therapeutic Exercise   SportsArt L3 x 6 min   Dynamics: knee hugs, butt kicks, tin soldiers x2, x 40' each   R/L stool scoots 40' x 2  R,L SL heel raises x 1 min each   Gastroc stretch slantboard x 1 min   R hamstring curl with loop around R foot/L ankle x 1 min   R,L hip extension yellow loop at ankles pulses x 1 min each  R,L standing fire hydrants yellow loop above knees x 1 min each   Decline squats 15# KB x 1 min   R SL HS curl machine 40# x 1 min, 40# x 1 min  Bridge walk outs x 5 reps  Bridges on swiss ball demo x 5 reps  Bridge with hamstring curl red swiss ball x 8 reps      OP EDUCATION:  Access Code: [email or web address]  URL: [email or web address]  Date: 04/22/2025  Prepared by: Marisa Palafox    *red tband   Exercises  - Standing Hamstring Curl with Resistance  - 1 x daily - 7 x weekly - 2 sets - 10 reps  - Standing Clam with Resistance Loop  - 1 x daily - 7 x weekly " - 2 sets - 10 reps  - Standing Hip Extension Kicks  - 1 x daily - 7 x weekly - 2 sets - 10 reps  - Bridge Walk Out  - 1 x daily - 7 x weekly - 2 sets - 10 reps  - Bridge with Heels on Swiss Ball  - 1 x daily - 7 x weekly - 2 sets - 10 reps  - Supine Hamstring Curl on Swiss Ball  - 1 x daily - 7 x weekly - 2 sets - 10 reps    Assessment:  Pt showing good progress under POC. Continued focus on hamstring strengthening with evident fatigue during session R>L LE. Challenged with hamstring curl using swiss ball. Updated HEP with good understanding. Appropriate for 1x/week starting next week.       Plan:  Decrease PT to x 1 a week   Progress LE strengthening with focus on HS.  OP PT Plan  Number of Treatments Authorized: 31/MN (DED 3200 80/20 COVERAGE V MED [facility] REF#[id number])    Marisa Palafox, PT

## 2025-04-24 NOTE — PROGRESS NOTES
"  Physical Therapy Treatment    Patient Name: Amara Cote  MRN: [id number]  Today's Date: 4/25/2025  Time Calculation  Start Time: 0819  Stop Time: 0901  Time Calculation (min): 42 min  PT Therapeutic Procedures Time Entry  Neuromuscular Re-Education Time Entry: 5  Therapeutic Exercise Time Entry: 36       Current Problem  Problem List Items Addressed This Visit           ICD-10-CM       Musculoskeletal and Injuries    Right knee pain - Primary M25.561    Tibial plateau fracture, right, closed, with routine healing, subsequent encounter S82.141D       Insurance:  Number of Treatments Authorized: 32/MN (DED 3200 80/20 COVERAGE V MED NEC REF#[id number])          Subjective   General  Pt reports she's feeling well today, a little stiff since it's early. Going walking after session.     Performing HEP?: Yes    Precautions  Precautions  Precautions Comment: No fall risk (WBAT with brace donned- cleared to wean from brace on 1/19/24 per PA)  Pain   0/10 R knee, still feels a bit \"weird\"    Objective   Reduced R anterior tibial translation with split squats / lunges    Treatments:   Therapeutic Exercise   SportsArt L3 x 6 min   Dynamics: knee hugs, butt kicks, tin soldiers, toe swipes x 40' each  R SL HS curl machine 40#, 1 min x3  R hamstring curl with loop around R foot/L ankle x 1 min   R,L hip extension yellow loop at ankles pulses x 1 min each  R,L standing fire hydrants yellow loop above knees x 1 min each   Bridges on swiss ball x 1 min   Bridge with hamstring curl red swiss ball x 6 reps  Bridge walk outs x 5 reps    NMR   TRX squats x 1 min   TRX R/L split squats x 1 min   TRX L/R split squats x 1 min       OP EDUCATION:  Access Code: [email or web address]  URL: [email or web address]  Date: 04/22/2025  Prepared by: Marisa Palafox    *red tband   Exercises  - Standing Hamstring Curl with Resistance  - 1 x daily - 7 x weekly - 2 sets - 10 reps  - Standing Clam with Resistance Loop  - 1 x daily - 7 x weekly " - 2 sets - 10 reps  - Standing Hip Extension Kicks  - 1 x daily - 7 x weekly - 2 sets - 10 reps  - Bridge Walk Out  - 1 x daily - 7 x weekly - 2 sets - 10 reps  - Bridge with Heels on Swiss Ball  - 1 x daily - 7 x weekly - 2 sets - 10 reps  - Supine Hamstring Curl on Swiss Ball  - 1 x daily - 7 x weekly - 2 sets - 10 reps    Assessment:  Pt showing excellent progress under POC. Reduced R anterior tibial translation during lunges, improving with cues. Continued review and focus on posterior chain strengthening with good understanding. Coaxed for HEP.  Appropriate for 1x/week starting next week.       Plan:  Decrease PT to x 1 a week   Progress LE strengthening with focus on HS.  OP PT Plan  Number of Treatments Authorized: 32/MN (DED 3200 80/20 COVERAGE V MED [facility] REF#[id number])    Marisa Palafox, PT

## 2025-04-25 ENCOUNTER — TREATMENT (OUTPATIENT)
Dept: PHYSICAL THERAPY | Facility: CLINIC | Age: 60
End: 2025-04-25
Payer: COMMERCIAL

## 2025-04-25 DIAGNOSIS — M25.561 ACUTE PAIN OF RIGHT KNEE: Primary | ICD-10-CM

## 2025-04-25 DIAGNOSIS — S82.141D TIBIAL PLATEAU FRACTURE, RIGHT, CLOSED, WITH ROUTINE HEALING, SUBSEQUENT ENCOUNTER: ICD-10-CM

## 2025-04-25 PROCEDURE — 97110 THERAPEUTIC EXERCISES: CPT | Mod: GP | Performed by: PHYSICAL THERAPIST

## 2025-04-29 ENCOUNTER — TREATMENT (OUTPATIENT)
Dept: PHYSICAL THERAPY | Facility: CLINIC | Age: 60
End: 2025-04-29
Payer: COMMERCIAL

## 2025-04-29 DIAGNOSIS — M25.561 ACUTE PAIN OF RIGHT KNEE: ICD-10-CM

## 2025-04-29 PROCEDURE — 97110 THERAPEUTIC EXERCISES: CPT | Mod: GP

## 2025-04-29 NOTE — PROGRESS NOTES
Physical Therapy Treatment    Patient Name: Amara Cote  MRN: [id number]  Today's Date: 4/29/2025  Time Calculation  Start Time: 0834  Stop Time: 0915  Time Calculation (min): 41 min  PT Therapeutic Procedures Time Entry  Therapeutic Exercise Time Entry: 39       Current Problem  Problem List Items Addressed This Visit           ICD-10-CM    Right knee pain M25.561       Insurance:  Number of Treatments Authorized: 33/MN (DED 3200 80/20 COVERAGE V MED [facility] REF#[id number])          Subjective   General  Been walking, able to get 5 miles in goal is 7-8 miles. Has not don much trail walking yet, want to get more in now that the weather is better.   Having periods of time when she forgets that has had an issue with the knee.      Performing HEP?: Yes    Precautions  Precautions  Precautions Comment: No fall risk (WBAT with brace donned- cleared to wean from brace on 1/19/24 per PA)  Pain   0/10 R knee    Objective   Reduced R anterior tibial translation with split squats / lunges    Treatments:   Therapeutic Exercise   SportsArt L3 x 6 min   Dynamics: knee hugs, butt kicks, loaded R knee ext with self OP, Fwd lunge x 40' each  R SL HS curl machine 40#s 1 min x 3  R S/L leg press 20#s 1 min x 3   At // bars:  R Hip ext 2#s from flexed position 1 min x 2  R knee flexion 2#s 1 min x 2   R hip hinges/RDL 1 min x 2   R/L lateral steps green loop 40 ft x 2 each     OP EDUCATION:  Access Code: [email or web address]  URL: [email or web address]  Date: 04/22/2025  Prepared by: Marisa Palafox    *red tband   Exercises  - Standing Hamstring Curl with Resistance  - 1 x daily - 7 x weekly - 2 sets - 10 reps  - Standing Clam with Resistance Loop  - 1 x daily - 7 x weekly - 2 sets - 10 reps  - Standing Hip Extension Kicks  - 1 x daily - 7 x weekly - 2 sets - 10 reps  - Bridge Walk Out  - 1 x daily - 7 x weekly - 2 sets - 10 reps  - Bridge with Heels on Swiss Ball  - 1 x daily - 7 x weekly - 2 sets - 10 reps  - Supine  Hamstring Curl on Swiss Ball  - 1 x daily - 7 x weekly - 2 sets - 10 reps    Assessment:  Pt tolerated the session well, denied pain with any exercise just complained of fatigue.  Overall making good progress per subjective report.     Plan:  Follow up with x 1 a week with independent strengthening   Progress LE strengthening with focus on HS.  OP PT Plan  Number of Treatments Authorized: 33/MN (DED 3200 80/20 COVERAGE V MED [facility] REF#[id number])    Gabriele Long, PT

## 2025-05-06 ENCOUNTER — APPOINTMENT (OUTPATIENT)
Dept: PHYSICAL THERAPY | Facility: CLINIC | Age: 60
End: 2025-05-06
Payer: COMMERCIAL

## 2025-05-07 NOTE — PROGRESS NOTES
Physical Therapy Treatment    Patient Name: Amara Cote  MRN: [id number]  Today's Date: 5/8/2025  Time Calculation  Start Time: 0921  Stop Time: 1002  Time Calculation (min): 41 min  PT Therapeutic Procedures Time Entry  Neuromuscular Re-Education Time Entry: 8  Therapeutic Exercise Time Entry: 32       Current Problem  Problem List Items Addressed This Visit           ICD-10-CM       Musculoskeletal and Injuries    Right knee pain M25.561         Insurance:  Number of Treatments Authorized: 34/MN (DED 3200 80/20 COVERAGE V MED NEC REF#[id number])          Subjective   General  Pt reports she's doing well with her knee. Some days better than others. Still can get tight. Walked yesterday for 45 minutes then another 45 minutes at night. Didn't have to stop to stretch. Still hasn't been able to go her usual 2 hours.     Performing HEP?: Partially -  has been traveling for work    Precautions  Precautions  Precautions Comment: No fall risk (WBAT with brace donned- cleared to wean from brace on 1/19/24 per PA)  Pain   0/10 R knee    Objective   Non-antalgic gait on arrival   R SLS 48 sec  L SLS 25 sec     Treatments:   Therapeutic Exercise   SportsArt L3/4 x 6 min   Dynamics: knee hugs, butt kicks, tin soldiers x2, x 40' each  R/L walking lunges 40' x 2   R/L lateral squat shifts x 1 min   Squats 15# x 1 min   Sumo squats 15# x 1 min   RDLs 15#, 1 min x2  R,L SL TGL7 1 min each, x2  R SL hamstring curl machine 40# x 1 min, 50# x 1 min     Neuromuscular Re-ed   R,L SL (staggered) RDLs 10# x 1 min each side  R,L SLS x 1 min each   R,L SLS with anterior reach to rail with fwd T x 1 min each   *SL strengthening and balance exercises for HEP       OP EDUCATION:  Access Code: [email or web address]  URL: [email or web address]  Date: 04/22/2025  Prepared by: Marisa Palafox    *red tband   Exercises  - Standing Hamstring Curl with Resistance  - 1 x daily - 7 x weekly - 2 sets - 10 reps  - Standing Clam with Resistance  Loop  - 1 x daily - 7 x weekly - 2 sets - 10 reps  - Standing Hip Extension Kicks  - 1 x daily - 7 x weekly - 2 sets - 10 reps  - Bridge Walk Out  - 1 x daily - 7 x weekly - 2 sets - 10 reps  - Bridge with Heels on Swiss Ball  - 1 x daily - 7 x weekly - 2 sets - 10 reps  - Supine Hamstring Curl on Swiss Ball  - 1 x daily - 7 x weekly - 2 sets - 10 reps    Assessment:  Pt continues to show good progress under POC. Challenged with RDLs and dynamic balance exercises with greater fatigue R>L. Improved R SLS. Reduced motion with R SL TG but able to tolerate increased weight with HS curl machine.     Plan:  Follow up with x 1 a week with independent strengthening   Progress LE strengthening with focus on HS.  OP PT Plan  Number of Treatments Authorized: 34/MN (DED 3200 80/20 COVERAGE V MED [facility] REF#[id number])    Marisa Palafox, PT

## 2025-05-08 ENCOUNTER — TREATMENT (OUTPATIENT)
Dept: PHYSICAL THERAPY | Facility: CLINIC | Age: 60
End: 2025-05-08
Payer: COMMERCIAL

## 2025-05-08 DIAGNOSIS — M25.561 ACUTE PAIN OF RIGHT KNEE: ICD-10-CM

## 2025-05-08 PROCEDURE — 97112 NEUROMUSCULAR REEDUCATION: CPT | Mod: GP | Performed by: PHYSICAL THERAPIST

## 2025-05-08 PROCEDURE — 97110 THERAPEUTIC EXERCISES: CPT | Mod: GP | Performed by: PHYSICAL THERAPIST

## 2025-05-14 ENCOUNTER — TREATMENT (OUTPATIENT)
Dept: PHYSICAL THERAPY | Facility: CLINIC | Age: 60
End: 2025-05-14
Payer: COMMERCIAL

## 2025-05-14 DIAGNOSIS — M25.561 ACUTE PAIN OF RIGHT KNEE: ICD-10-CM

## 2025-05-14 PROCEDURE — 97110 THERAPEUTIC EXERCISES: CPT | Mod: GP | Performed by: PHYSICAL THERAPIST

## 2025-05-14 NOTE — PROGRESS NOTES
"  Physical Therapy Treatment    Patient Name: Amara Cote  MRN: [id number]  Today's Date: 5/14/2025  Time Calculation  Start Time: 1605  Stop Time: 1647  Time Calculation (min): 42 min  PT Therapeutic Procedures Time Entry  Therapeutic Exercise Time Entry: 40       Current Problem  Problem List Items Addressed This Visit           ICD-10-CM       Musculoskeletal and Injuries    Right knee pain M25.561         Insurance:  Number of Treatments Authorized: 35/MN (DED 3200 80/20 COVERAGE V MED NEC REF#[id number])          Subjective   General  Pt had some soreness at night after last session. Pt was out of town over the weekend so was only able to go walking. Getting some knee stiffness and discomfort throughout the day.    Performing HEP?: Partially -  has been traveling    Precautions  Precautions  Precautions Comment: No fall risk (WBAT with brace donned- cleared to wean from brace on 1/19/24 per PA)  Pain   0/10 R knee    Objective   Reduced knee extension with SL quad ext    Treatments:   Therapeutic Exercise   SportsArt L3/4 x 6 min   Dynamics: knee hugs, butt kicks, tin soldiers, fwd lunges x 40' each  Gastroc stretch slantboard x 1 min   R,L SL heel raises x 1 min each   RDLs BW for form x 1 min   RDLs 15# x 1 min   Squats 15# x 1 min   R QS + SLR off 8\" step x 1 min   R/L lateral squat shifts 15# x 1 min   R SL hamstring curl machine 50# x 1 min, x2  Knee extension machines: B LE 40# x 1 min, R,L SL 5 reps each     OP EDUCATION:  Access Code: [email or web address]  URL: [email or web address]  Date: 04/22/2025  Prepared by: Marisa Palafox    *red tband   Exercises  - Standing Hamstring Curl with Resistance  - 1 x daily - 7 x weekly - 2 sets - 10 reps  - Standing Clam with Resistance Loop  - 1 x daily - 7 x weekly - 2 sets - 10 reps  - Standing Hip Extension Kicks  - 1 x daily - 7 x weekly - 2 sets - 10 reps  - Bridge Walk Out  - 1 x daily - 7 x weekly - 2 sets - 10 reps  - Bridge with Heels on Swiss " Ball  - 1 x daily - 7 x weekly - 2 sets - 10 reps  - Supine Hamstring Curl on Swiss Ball  - 1 x daily - 7 x weekly - 2 sets - 10 reps    Assessment:  Progressing well with all exercises. Fatiguing with lunges and squats. Hamstring and quad strength is improving, however deficits with end range knee extension ROM on right during SL quad extension machine. Coaxed for strengthening via HEP with good understanding.     Plan:  Follow up with x 1 a week with independent strengthening   Progress LE strengthening with focus on HS.  OP PT Plan  Number of Treatments Authorized: 35/MN (DED 3200 80/20 COVERAGE V MED [facility] REF#[id number])    Marisa Palafox, PT

## 2025-05-19 ENCOUNTER — TREATMENT (OUTPATIENT)
Dept: PHYSICAL THERAPY | Facility: CLINIC | Age: 60
End: 2025-05-19
Payer: COMMERCIAL

## 2025-05-19 DIAGNOSIS — M25.561 ACUTE PAIN OF RIGHT KNEE: ICD-10-CM

## 2025-05-19 PROCEDURE — 97110 THERAPEUTIC EXERCISES: CPT | Mod: GP

## 2025-05-19 NOTE — PROGRESS NOTES
Physical Therapy Treatment    Patient Name: Amara Cote  MRN: [id number]  Today's Date: 5/20/2025  Time Calculation  Start Time: 1734  Stop Time: 1820  Time Calculation (min): 46 min  PT Therapeutic Procedures Time Entry  Therapeutic Exercise Time Entry: 31       Current Problem  Problem List Items Addressed This Visit           ICD-10-CM    Right knee pain M25.561         Insurance:  Number of Treatments Authorized: 36/MN (DED 3200 80/20 COVERAGE V MED NEC REF#[id number])          Subjective   General  Wants the R leg to look and feel like the L leg. Worried about how the R leg will look with the scar and indentation form the plate in short/skirts/dresses etc.  Will occasionally have pain In the middle of the night at random and very infrequent.     Performing HEP?: Partially - mostly walking.    Precautions  Precautions  Precautions Comment: No fall risk (WBAT with brace donned- cleared to wean from brace on 1/19/24 per PA.  Next POV with [facility] is 6/12/25.)  Pain   0/10 R knee    Objective   Seated Knee Dynamometer (best score)  L knee flexion = 17.9 kg  R knee flexion = 13.6 kg   L knee ext = 21.6 kg  R knee ext = 17.9 kg     Treatments:   Therapeutic Exercise   SportsArt L3/4 x 6 min   Dynamics: knee hugs, butt kicks, tin soldiers x 2, fwd lunges x 2 x 40' each  Strength testing - see above   Supine Straight leg bridge to HS curl on  red SB 15 x 2   Heel propped Squats 15 x 2       OP EDUCATION:  5/19/25: encouraged pt to return to walking on trail walking with walking poles/sticks    Date: 04/22/2025  Prepared by: Marisa Palafox  *red tband   Exercises  - Standing Hamstring Curl with Resistance  - 1 x daily - 7 x weekly - 2 sets - 10 reps  - Standing Clam with Resistance Loop  - 1 x daily - 7 x weekly - 2 sets - 10 reps  - Standing Hip Extension Kicks  - 1 x daily - 7 x weekly - 2 sets - 10 reps  - Bridge Walk Out  - 1 x daily - 7 x weekly - 2 sets - 10 reps  - Bridge with Heels on Swiss Ball  - 1 x daily -  7 x weekly - 2 sets - 10 reps  - Supine Hamstring Curl on Swiss Ball  - 1 x daily - 7 x weekly - 2 sets - 10 reps    Assessment:  Pt's knee flexion strength has improved as compared to strength testing approx 1 month ago.  However, both flexion and ext strength is not equal to uninvolved LE.  Also, pt has not returned to hikes/walks on unstable/incline/decline surface which she admits is mostly secondary to fear of falling.      Plan:  X 2 more PT sessions every other week  Transition to self management/progression with HEP  F/U with hikes/walks on unstable surfaces/incline/declines   OP PT Plan  Number of Treatments Authorized: 36/MN (DED 3200 80/20 COVERAGE V MED NEC REF#[id number])    Gabriele Long, PT

## 2025-05-28 ENCOUNTER — APPOINTMENT (OUTPATIENT)
Dept: PHYSICAL THERAPY | Facility: CLINIC | Age: 60
End: 2025-05-28
Payer: COMMERCIAL

## 2025-06-04 ENCOUNTER — TELEPHONE (OUTPATIENT)
Facility: CLINIC | Age: 60
End: 2025-06-04
Payer: COMMERCIAL

## 2025-06-04 NOTE — TELEPHONE ENCOUNTER
Traveling to [place]   You referred her to travel clinic. They charge $100.00 for consultation

## 2025-06-12 ENCOUNTER — HOSPITAL ENCOUNTER (OUTPATIENT)
Dept: RADIOLOGY | Facility: CLINIC | Age: 60
Discharge: HOME | End: 2025-06-12
Payer: COMMERCIAL

## 2025-06-12 ENCOUNTER — OFFICE VISIT (OUTPATIENT)
Dept: ORTHOPEDIC SURGERY | Facility: CLINIC | Age: 60
End: 2025-06-12
Payer: COMMERCIAL

## 2025-06-12 ENCOUNTER — APPOINTMENT (OUTPATIENT)
Facility: CLINIC | Age: 60
End: 2025-06-12
Payer: COMMERCIAL

## 2025-06-12 DIAGNOSIS — S82.141A TIBIAL PLATEAU FRACTURE, RIGHT, CLOSED, INITIAL ENCOUNTER: ICD-10-CM

## 2025-06-12 PROCEDURE — 73560 X-RAY EXAM OF KNEE 1 OR 2: CPT | Mod: RT

## 2025-06-12 PROCEDURE — 99212 OFFICE O/P EST SF 10 MIN: CPT | Performed by: ORTHOPAEDIC SURGERY

## 2025-06-12 PROCEDURE — 99213 OFFICE O/P EST LOW 20 MIN: CPT | Performed by: ORTHOPAEDIC SURGERY

## 2025-06-12 PROCEDURE — 73560 X-RAY EXAM OF KNEE 1 OR 2: CPT | Mod: RIGHT SIDE | Performed by: RADIOLOGY

## 2025-06-12 ASSESSMENT — PAIN - FUNCTIONAL ASSESSMENT: PAIN_FUNCTIONAL_ASSESSMENT: 0-10

## 2025-06-12 ASSESSMENT — PAIN SCALES - GENERAL: PAINLEVEL_OUTOF10: 2

## 2025-06-12 ASSESSMENT — PAIN DESCRIPTION - DESCRIPTORS: DESCRIPTORS: TIGHTNESS

## 2025-06-12 NOTE — PROGRESS NOTES
Chief complaint I keep getting better with my right knee but still not 100% by far.    History 59-year-old female healthy who sustained a right tibial plateau fracture lateral along with a lateral meniscal tear and underwent repair on 9/27/2024.  She is full weightbearing not wearing a brace and just completing physical therapy.  She is not having much pain but the knee still feels weak.  She is walking actively and is started walking in heels in the woods.  Occasionally the knee does give a twinge of pain in certain movements but it really does not bother her that much.      Her physical exam reveals a 59-year-old female who appears much younger than her stated age.  She has a BMI of 18..  Examining her right leg she still has some atrophy of the right quad and hamstring compared to the opposite side she has full knee extension and almost to 140 degrees of knee flexion.  She is slightly tender over the lateral joint line all incisions are well-healed she has no joint effusion her knee is stable anterior posteriorly medial laterally she has a negative Lachman she has negative drawer she has negative Deirdre's sign.    X-rays of the right knee show good maintenance of mechanical alignment.  The exact fracture is difficult to see but looks to be healed the lateral joint space has some very mild incongruity but has a very wide joint space and good mechanical axis.    Assessment 8 -1/2-month status post ORIF of right tibial plateau with repair of peripheral tear lateral meniscus progressing well.    Plan \follow-up as needed

## 2025-06-13 ENCOUNTER — TELEPHONE (OUTPATIENT)
Dept: OPHTHALMOLOGY | Age: 60
End: 2025-06-13
Payer: COMMERCIAL

## 2025-06-16 ENCOUNTER — APPOINTMENT (OUTPATIENT)
Dept: INFECTIOUS DISEASES | Facility: CLINIC | Age: 60
End: 2025-06-16
Payer: COMMERCIAL

## 2025-06-16 VITALS
SYSTOLIC BLOOD PRESSURE: 165 MMHG | OXYGEN SATURATION: 96 % | HEART RATE: 96 BPM | TEMPERATURE: 98.4 F | WEIGHT: 117 LBS | BODY MASS INDEX: 18.88 KG/M2 | DIASTOLIC BLOOD PRESSURE: 94 MMHG

## 2025-06-16 DIAGNOSIS — Z71.84 COUNSELING FOR TRAVEL: Primary | ICD-10-CM

## 2025-06-16 PROCEDURE — 90691 TYPHOID VACCINE IM: CPT | Performed by: STUDENT IN AN ORGANIZED HEALTH CARE EDUCATION/TRAINING PROGRAM

## 2025-06-16 PROCEDURE — 99202U03 TRAVEL CONSULT (U03): Performed by: STUDENT IN AN ORGANIZED HEALTH CARE EDUCATION/TRAINING PROGRAM

## 2025-06-16 PROCEDURE — 90471U01 TYPHOID VICPS VACCINE IM: Performed by: STUDENT IN AN ORGANIZED HEALTH CARE EDUCATION/TRAINING PROGRAM

## 2025-06-16 RX ORDER — ONDANSETRON 4 MG/1
4 TABLET, ORALLY DISINTEGRATING ORAL EVERY 8 HOURS PRN
Qty: 20 TABLET | Refills: 0 | Status: SHIPPED | OUTPATIENT
Start: 2025-06-16 | End: 2025-06-23

## 2025-06-16 RX ORDER — ATOVAQUONE AND PROGUANIL HYDROCHLORIDE 250; 100 MG/1; MG/1
1 TABLET, FILM COATED ORAL DAILY
Qty: 23 TABLET | Refills: 0 | Status: SHIPPED | OUTPATIENT
Start: 2025-06-16 | End: 2025-07-09

## 2025-06-16 RX ORDER — CIPROFLOXACIN 500 MG/1
500 TABLET, FILM COATED ORAL 2 TIMES DAILY
Qty: 6 TABLET | Refills: 0 | Status: SHIPPED | OUTPATIENT
Start: 2025-06-16 | End: 2025-06-19

## 2025-06-16 ASSESSMENT — PAIN SCALES - GENERAL: PAINLEVEL_OUTOF10: 0-NO PAIN

## 2025-06-16 NOTE — PATIENT INSTRUCTIONS
You were seen today for your upcoming trip.    For your country specific issues, please refer back to the [employer] handouts supplied to you in the travel brochure folder that we provided to you at your visit.  These are updated daily, if necessary, by the reporting agencies, so are a valuable source of information for your trip.    This brief summary may not contain all of the items that we discussed today.  Please review the handouts given to you as well.    ** Please be sure to carry any medications with you in your carry-on luggage in the event that your luggage is delayed or lost during your travels.    ** For your trip, as we discussed, standard food and water precautions apply.     You should avoid drinking any water that is not bottled.  Alcoholic or carbonated beverages are safe to drink.  Any beverage that has been brought to a rolling boil for  3 minutes is also safe to drink (once it has cooled some).  Commercially purchased milk products that are boxed (may be on store shelves) or refrigerated, are pasteurized and therefore safe to drink as long as they are refrigerated after opening.  Juices that are prepared commercially (in boxes or individual bottles) are also safe to drink as they are pasteurized as well.  Avoid road-side juice vendors as the juice may be diluted with contaminated water or produced from unclean fruit.  Regarding food precautions, you want to make sure that meats are well cooked.   Avoid eating fresh fruits and vegetables raw with the skin intact.  Peel before eating.  Avoid salads due to possible contamination by contaminated water.  Avoid any unpasteurized cheeses (they typically are very white in appearance)    ** We recommend that you use insect repellant to help you prevent infections caused by biting insects such as mosquitoes, flies, ticks, fleas and chiggers.  This includes protection against Zika virus, Dengue virus, Chikungunya and Malaria, just to name a few.    For insect  "repellents that are applied directly to the skin, we recommend DEET containing repellants with a percentage between 20-40%.  Apply to skin exposed surfaces only, every 6-8 hours throughout the day.  I typically recommend applying before breakfast, lunch and dinner as these are natural breaks in your day.  Wash your hands after applying. Apply again in the evening.  You must reapply after bathing or swimming as it is washed away with water.  Apply after sunscreen products are applied. DEET containing repellants protect against all concerning insects with the exception of hornets, wasps or bees. Activity against ticks only lasts for 2 hours. For additional Tick precautions while hiking, tuck your pant legs into your socks as this will prevent ticks from crawling up your shoes / socks onto your legs while walking. Perform a \"tick check\" at least once daily, at the end of your day.  Check in the sporting goods areas of most stores for these products.  A recommended alternative, Picardin ,may also be used every 8 hours.  If you are unable to locate the product in stores, try on-line stores as well.      PERMETHRIN SPRAY  is an additional option and is for application to clothing and garments only.  This can be applied to hats, bandanas, socks, boots and any clothing items to prevent insect attention.  I can be applied before you leave and will remain active through many washes and for up to 6 weeks in unwashed clothing.  Read any packaging for full specifications. Apply in an open area as when wet, it has an odor. Once dry, it typically has no odor and does not stain clothing. Regular repellants should be applied to exposed skin however.  Permethrin may only be available on-line.     **  As a general safety procedure, we recommend that you scan a copy of your passport, any travel required documents (yellow fever vaccine card), and itinerary and email them to yourself.  Keep these in a special travel folder for reference " in the event that your travel documents are misplaced or stolen while abroad.  You should also leave a detailed copy of your itinerary with a friend or relative at home for reference as well.  If traveling for long periods, an international [employer] card or global plan for your cellular service may be beneficial for communication. This list is not meant to be all inclusive.      **  Please review and understand any cultural aspects of the countries that you will be visiting to ensure that appropriate dress and behaviors are understood.  ENJOY YOUR TRIP (o:      **  Make sure to come back to complete any vaccine series that may have been started today.  This will ensure full vaccine efficacy and protection for future travel.    Today, you received the Typhoid vaccine, which is good for 2 years.  I sent prescriptions for medicine to prevent malaria, ciprofloxacin for Traveler's diarrhea, and ondansetron (Zofran) for nausea to your pharmacy.  Enjoy your trip!

## 2025-06-17 ENCOUNTER — DOCUMENTATION (OUTPATIENT)
Dept: OPHTHALMOLOGY | Facility: CLINIC | Age: 60
End: 2025-06-17
Payer: COMMERCIAL

## 2025-06-17 NOTE — PROGRESS NOTES
Traveling to [facility] from 7/25-8/07/25. Two HepA vaccinations in 1998 and booster in 3/2011.  Tdap 2/2025.     Ordered Typhoid ViCPs vaccine.    Prescribed Malarone (23 doses), cipro for Traveler's diarrhea, and ondansetron for nausea (at patient's request).    Discussed food, water, and insect precautions.

## 2025-06-18 ENCOUNTER — TREATMENT (OUTPATIENT)
Dept: PHYSICAL THERAPY | Facility: CLINIC | Age: 60
End: 2025-06-18
Payer: COMMERCIAL

## 2025-06-18 DIAGNOSIS — M25.561 ACUTE PAIN OF RIGHT KNEE: ICD-10-CM

## 2025-06-18 PROCEDURE — 97110 THERAPEUTIC EXERCISES: CPT | Mod: GP

## 2025-06-18 NOTE — PROGRESS NOTES
Physical Therapy Treatment    Patient Name: Amara Cote  MRN: [id number]  Today's Date: 6/18/2025  Time Calculation  Start Time: 1745  Stop Time: 1820  Time Calculation (min): 35 min  PT Therapeutic Procedures Time Entry  Therapeutic Exercise Time Entry: 33       Current Problem  Problem List Items Addressed This Visit           ICD-10-CM    Right knee pain M25.561         Insurance:             Subjective   General  Saw the surgeon and is pleased with progress told she needs a .  Did a walk in the woods and felt fine.  But when she got out the car and was walking into the store it popped. But it did not hurt or get stiff.    Performing HEP?: Partially - mostly walking.    Precautions     Pain   0/10 R knee    Objective       Treatments:   Therapeutic Exercise   SportsArt L 5 x 5 min   R HS curl machine 40#s 1 min x 3   TG L7 R S/L squats 1 min x 2   R LAQ machine 10#s 1 min x 3   Heel propped Squats 15 x 2       OP EDUCATION:  5/19/25: encouraged pt to return to walking on trail walking with walking poles/sticks    Date: 04/22/2025  Prepared by: Marisa Palafox  *red tband   Exercises  - Standing Hamstring Curl with Resistance  - 1 x daily - 7 x weekly - 2 sets - 10 reps  - Standing Clam with Resistance Loop  - 1 x daily - 7 x weekly - 2 sets - 10 reps  - Standing Hip Extension Kicks  - 1 x daily - 7 x weekly - 2 sets - 10 reps  - Bridge Walk Out  - 1 x daily - 7 x weekly - 2 sets - 10 reps  - Bridge with Heels on Swiss Ball  - 1 x daily - 7 x weekly - 2 sets - 10 reps  - Supine Hamstring Curl on Swiss Ball  - 1 x daily - 7 x weekly - 2 sets - 10 reps    Assessment:  Pt's continues to slowly progress with function and deny pain with all activities.  Challenged by  strengthen but by fatigue not pain.  Pt should be able to continue to progress independently at this time.  She is in agreement with discharge from PT, achieved most significant goals.       Plan:  Discharge        Gabriele Long, PT

## 2025-07-01 ENCOUNTER — OFFICE VISIT (OUTPATIENT)
Dept: OPHTHALMOLOGY | Facility: CLINIC | Age: 60
End: 2025-07-01
Payer: COMMERCIAL

## 2025-07-01 ENCOUNTER — TELEPHONE (OUTPATIENT)
Dept: OPHTHALMOLOGY | Facility: CLINIC | Age: 60
End: 2025-07-01

## 2025-07-01 DIAGNOSIS — H52.13 MYOPIA OF BOTH EYES WITH ASTIGMATISM AND PRESBYOPIA: Primary | ICD-10-CM

## 2025-07-01 DIAGNOSIS — H52.203 MYOPIA OF BOTH EYES WITH ASTIGMATISM AND PRESBYOPIA: Primary | ICD-10-CM

## 2025-07-01 DIAGNOSIS — H52.4 MYOPIA OF BOTH EYES WITH ASTIGMATISM AND PRESBYOPIA: Primary | ICD-10-CM

## 2025-07-01 PROCEDURE — FLVLG CONTACT LENS EVAL - LEVEL 2 (SP): Performed by: OPTOMETRIST

## 2025-07-01 ASSESSMENT — REFRACTION_MANIFEST
OS_AXIS: 090
OS_SPHERE: -2.00
OD_ADD: +2.50
OD_SPHERE: -2.25
OD_CYLINDER: -1.00
OS_ADD: +2.50
OD_AXIS: 105
OS_CYLINDER: -1.50

## 2025-07-01 ASSESSMENT — REFRACTION_CURRENTRX
OD_SPHERE: -2.25
OD_ADD: +2.50
OS_DIAMETER: 14.3
OD_BASECURVE: 8.4
OD_SPHERE: -2.25
OS_BRAND: ACUVUE OASYS MAX 1 DAY MF
OS_AXIS: 090
OS_CYLINDER: -1.00
OD_AXIS: 100
OS_SPHERE: -2.25
OD_BASECURVE: 8.4
OD_DIAMETER: 14.3
OS_CYLINDER: -1.00
OS_DIAMETER: 14.3
OS_AXIS: 090
OD_DIAMETER: 14.3
OD_CYLINDER: -1.00
OS_BASECURVE: 8.4
OD_BRAND: ACUVUE OASYS MAX 1 DAY MF
OS_SPHERE: -2.25
OS_ADD: +1.25
OD_AXIS: 100
OS_BRAND: ACUVUE OASYS MAX 1 DAY MF
OS_BASECURVE: 8.4
OD_ADD: MID
OD_CYLINDER: -1.00
OS_ADD: MID
OD_BRAND: ACUVUE OASYS MAX 1 DAY MF

## 2025-07-01 ASSESSMENT — ENCOUNTER SYMPTOMS
CONSTITUTIONAL NEGATIVE: 0
ENDOCRINE NEGATIVE: 0
MUSCULOSKELETAL NEGATIVE: 0
NEUROLOGICAL NEGATIVE: 0
GASTROINTESTINAL NEGATIVE: 0
EYES NEGATIVE: 1
PSYCHIATRIC NEGATIVE: 0
HEMATOLOGIC/LYMPHATIC NEGATIVE: 0
ALLERGIC/IMMUNOLOGIC NEGATIVE: 0
CARDIOVASCULAR NEGATIVE: 0
RESPIRATORY NEGATIVE: 0

## 2025-07-01 ASSESSMENT — PACHYMETRY
OS_CT(UM): 584
OD_CT(UM): 574

## 2025-07-01 ASSESSMENT — VISUAL ACUITY
CORRECTION_TYPE: CONTACTS
OD_CC: 20/30
METHOD: SNELLEN - LINEAR
VA_OR_OD_CURRENT_RX: 20/25
OD_CC+: +2
VA_OR_OS_CURRENT_RX: 20/25
OS_CC: 20/25

## 2025-07-01 ASSESSMENT — SLIT LAMP EXAM - LIDS
COMMENTS: NORMAL
COMMENTS: NORMAL

## 2025-07-01 ASSESSMENT — EXTERNAL EXAM - LEFT EYE: OS_EXAM: NORMAL

## 2025-07-01 ASSESSMENT — EXTERNAL EXAM - RIGHT EYE: OD_EXAM: NORMAL

## 2025-07-01 NOTE — PROGRESS NOTES
Assessment/Plan   Diagnoses and all orders for this visit:  Myopia of both eyes with astigmatism and presbyopia    Refit to Daily Disposable Multifocal Toric lenses   Excellent for use while traveling    Provided two options  High Add Right eye and Low Add Left eye  Or Mid Add Both eyes    Call back to order best pair  No appt needed    Follow up as scheduled with Dr Rosario and me annually for CLs

## 2025-07-08 DIAGNOSIS — R19.7 DIARRHEA, UNSPECIFIED TYPE: Primary | ICD-10-CM

## 2025-07-08 RX ORDER — AMOXICILLIN AND CLAVULANATE POTASSIUM 875; 125 MG/1; MG/1
875 TABLET, FILM COATED ORAL 2 TIMES DAILY
Qty: 20 TABLET | Refills: 0 | Status: SHIPPED | OUTPATIENT
Start: 2025-07-08 | End: 2025-07-18

## 2025-08-01 ENCOUNTER — DOCUMENTATION (OUTPATIENT)
Dept: OPHTHALMOLOGY | Facility: CLINIC | Age: 60
End: 2025-08-01
Payer: COMMERCIAL

## 2025-08-01 ASSESSMENT — REFRACTION_CURRENTRX
OS_ADD: MID
OS_SPHERE: -2.25
OS_BASECURVE: 8.4
OS_AXIS: 090
OS_CYLINDER: -1.00
OD_CYLINDER: -1.00
OD_BASECURVE: 8.4
OS_BRAND: ACUVUE OASYS MAX 1 DAY MF
OD_DIAMETER: 14.3
OD_ADD: MID
OD_AXIS: 100
OD_BRAND: ACUVUE OASYS MAX 1 DAY MF
OD_SPHERE: -2.25
OS_DIAMETER: 14.3

## 2025-08-16 DIAGNOSIS — S16.1XXA STRAIN OF NECK MUSCLE, INITIAL ENCOUNTER: ICD-10-CM

## 2025-08-17 RX ORDER — METHYLPREDNISOLONE 4 MG/1
TABLET ORAL
Qty: 21 TABLET | Refills: 0 | Status: SHIPPED | OUTPATIENT
Start: 2025-08-17

## 2025-09-24 ENCOUNTER — APPOINTMENT (OUTPATIENT)
Dept: OPHTHALMOLOGY | Facility: CLINIC | Age: 60
End: 2025-09-24
Payer: COMMERCIAL

## (undated) DEVICE — Device

## (undated) DEVICE — DRESSING, NON-ADHERENT, OIL EMULSION, CURITY, 3 X 8 IN, STERILE

## (undated) DEVICE — DRILL, SYNTHES 2.8 F/LCP PLATE

## (undated) DEVICE — SUTURE, VICRYL, 2-0, 27 IN, FS-1, UNDYED

## (undated) DEVICE — BANDAGE, COFLEX, 4 X 5 YDS, TAN, STERILE, LF

## (undated) DEVICE — DRAPE, SHEET, U, STERI DRAPE, 47 X 51 IN, DISPOSABLE, STERILE

## (undated) DEVICE — COVER, C-ARM W/CLIPS, OEC GE

## (undated) DEVICE — DRAPE, SHEET, THREE QUARTER, FAN FOLD, 57 X 77 IN

## (undated) DEVICE — DRILL, 2.5MM 180MM/155MM QC

## (undated) DEVICE — TOWEL, SURGICAL, NEURO, O/R, 16 X 26, BLUE, STERILE

## (undated) DEVICE — SUTURE, ETHILON, 3-0, 30 IN, FS-1, BLACK

## (undated) DEVICE — BANDAGE, ELASTIC, MATRIX, SELF-CLOSURE, 6 IN X 5 YD, LF

## (undated) DEVICE — APPLICATOR, CHLORAPREP, W/ORANGE TINT, 26ML

## (undated) DEVICE — STAPLER, SKIN PROXIMATE, 35 WIDE

## (undated) DEVICE — SPONGE, LAP, XRAY DECT, 18IN X 18IN, W/LOOP, STERILE

## (undated) DEVICE — COVER, CART, 45 X 27 X 48 IN, CLEAR

## (undated) DEVICE — DRAPE COVER, C ARM, FLOUROSCAN IMAGING SYS

## (undated) DEVICE — COVER, BACK TABLE, 65 X 90, HVY REINFORCED